# Patient Record
Sex: MALE | Race: WHITE | NOT HISPANIC OR LATINO | Employment: OTHER | URBAN - METROPOLITAN AREA
[De-identification: names, ages, dates, MRNs, and addresses within clinical notes are randomized per-mention and may not be internally consistent; named-entity substitution may affect disease eponyms.]

---

## 2017-02-14 ENCOUNTER — HOSPITAL ENCOUNTER (OUTPATIENT)
Dept: LAB | Facility: MEDICAL CENTER | Age: 77
End: 2017-02-14
Attending: PHYSICIAN ASSISTANT
Payer: MEDICARE

## 2017-02-14 LAB
BASOPHILS # BLD AUTO: 0.04 K/UL (ref 0–0.12)
BASOPHILS NFR BLD AUTO: 0.7 % (ref 0–1.8)
EOSINOPHIL # BLD: 0.27 K/UL (ref 0–0.51)
EOSINOPHIL NFR BLD AUTO: 4.5 % (ref 0–6.9)
ERYTHROCYTE [DISTWIDTH] IN BLOOD BY AUTOMATED COUNT: 52.3 FL (ref 35.9–50)
HCT VFR BLD AUTO: 46.8 % (ref 42–52)
HGB BLD-MCNC: 14.9 G/DL (ref 14–18)
IMM GRANULOCYTES # BLD AUTO: 0.02 K/UL (ref 0–0.11)
IMM GRANULOCYTES NFR BLD AUTO: 0.3 % (ref 0–0.9)
LYMPHOCYTES # BLD: 1.69 K/UL (ref 1–4.8)
LYMPHOCYTES NFR BLD AUTO: 28.4 % (ref 22–41)
MCH RBC QN AUTO: 31.8 PG (ref 27–33)
MCHC RBC AUTO-ENTMCNC: 31.8 G/DL (ref 33.7–35.3)
MCV RBC AUTO: 99.8 FL (ref 81.4–97.8)
MONOCYTES # BLD: 0.36 K/UL (ref 0–0.85)
MONOCYTES NFR BLD AUTO: 6.1 % (ref 0–13.4)
NEUTROPHILS # BLD: 3.57 K/UL (ref 1.82–7.42)
NEUTROPHILS NFR BLD AUTO: 60 % (ref 44–72)
NRBC # BLD AUTO: 0 K/UL
NRBC BLD-RTO: 0 /100 WBC
PLATELET # BLD AUTO: 204 K/UL (ref 164–446)
PMV BLD AUTO: 10.5 FL (ref 9–12.9)
RBC # BLD AUTO: 4.69 M/UL (ref 4.7–6.1)
WBC # BLD AUTO: 6 K/UL (ref 4.8–10.8)

## 2017-02-14 PROCEDURE — 85025 COMPLETE CBC W/AUTO DIFF WBC: CPT

## 2017-02-14 PROCEDURE — 36415 COLL VENOUS BLD VENIPUNCTURE: CPT

## 2017-04-04 ENCOUNTER — OFFICE VISIT (OUTPATIENT)
Dept: CARDIOLOGY | Facility: MEDICAL CENTER | Age: 77
End: 2017-04-04
Payer: MEDICARE

## 2017-04-04 ENCOUNTER — NON-PROVIDER VISIT (OUTPATIENT)
Dept: CARDIOLOGY | Facility: MEDICAL CENTER | Age: 77
End: 2017-04-04
Payer: MEDICARE

## 2017-04-04 VITALS
HEART RATE: 82 BPM | HEIGHT: 65 IN | SYSTOLIC BLOOD PRESSURE: 100 MMHG | OXYGEN SATURATION: 93 % | WEIGHT: 182 LBS | BODY MASS INDEX: 30.32 KG/M2 | DIASTOLIC BLOOD PRESSURE: 56 MMHG

## 2017-04-04 DIAGNOSIS — I25.10 CORONARY ARTERY CALCIFICATION SEEN ON CAT SCAN: ICD-10-CM

## 2017-04-04 DIAGNOSIS — I10 ESSENTIAL HYPERTENSION, BENIGN: ICD-10-CM

## 2017-04-04 DIAGNOSIS — E78.2 MIXED HYPERLIPIDEMIA: ICD-10-CM

## 2017-04-04 DIAGNOSIS — Z95.0 CARDIAC PACEMAKER IN SITU: ICD-10-CM

## 2017-04-04 DIAGNOSIS — I49.5 SICK SINUS SYNDROME (HCC): ICD-10-CM

## 2017-04-04 PROCEDURE — 99213 OFFICE O/P EST LOW 20 MIN: CPT | Performed by: INTERNAL MEDICINE

## 2017-04-04 PROCEDURE — 1101F PT FALLS ASSESS-DOCD LE1/YR: CPT | Mod: 8P | Performed by: INTERNAL MEDICINE

## 2017-04-04 PROCEDURE — G8419 CALC BMI OUT NRM PARAM NOF/U: HCPCS | Performed by: INTERNAL MEDICINE

## 2017-04-04 PROCEDURE — 1036F TOBACCO NON-USER: CPT | Performed by: INTERNAL MEDICINE

## 2017-04-04 PROCEDURE — G8432 DEP SCR NOT DOC, RNG: HCPCS | Performed by: INTERNAL MEDICINE

## 2017-04-04 PROCEDURE — 93288 INTERROG EVL PM/LDLS PM IP: CPT | Performed by: NURSE PRACTITIONER

## 2017-04-04 PROCEDURE — G8598 ASA/ANTIPLAT THER USED: HCPCS | Performed by: INTERNAL MEDICINE

## 2017-04-04 PROCEDURE — 4040F PNEUMOC VAC/ADMIN/RCVD: CPT | Performed by: INTERNAL MEDICINE

## 2017-04-04 ASSESSMENT — LIFESTYLE VARIABLES: SUBSTANCE_ABUSE: 0

## 2017-04-04 ASSESSMENT — ENCOUNTER SYMPTOMS
WHEEZING: 0
BRUISES/BLEEDS EASILY: 0
ORTHOPNEA: 0
FALLS: 0
MYALGIAS: 0
PND: 0
COUGH: 0

## 2017-04-04 NOTE — MR AVS SNAPSHOT
Pro Kearney   2017 8:00 AM   Non-Provider Visit   MRN: [id number]    Department:  [facility]   Dept Phone:  [phone]    Description:  Male : 1940   Provider:  BRIANNA Payne           Reason for Visit     Pacemaker Check/Dysfunction           Allergies as of 2017     Allergen Noted Reactions    Rocephin [Ceftriaxone Sodium] 10/31/2012   Shortness of Breath, Rash, Itching    Anaphylactic type reaction, rapid onset    Codeine 10/17/2007   Vomiting    Clindamycin 2017       C difficile    Morphine 2015       Shellfish-Derived Products 2014   Itching    Shellfish, minor allergy; has had contrast without difficulty      You were diagnosed with     Cardiac pacemaker in situ   [V45.01.ICD-9-CM]       Sick sinus syndrome (CMS-HCC)   [777118]         Vital Signs     Smoking Status                   Former Smoker           Basic Information     Date Of Birth Sex Race Ethnicity Preferred Language    1940 Male White Non- English      Your appointments     2017  9:20 AM   FOLLOW UP with Larry Goss M.D.   [facility] ([facility]    [address]  [address]  [facility] [id number] [phone]              Problem List              ICD-10-CM Priority Class Noted - Resolved    GERD (gastroesophageal reflux disease) K21.9 Low  2011 - Present    Aortic calcification noted 2007 on xray I70.0 High  2013 - Present    Coronary artery calcification seen on CAT scan I25.10 High  2014 - Present    Cardiac pacemaker in situ Z95.0 High  2014 - Present    Lumbosacral spondylosis without myelopathy M47.817 Medium  2014 - Present    Mixed hyperlipidemia E78.2   2015 - Present    BMI 30.0-30.9,adult Z68.30   2015 - Present    Allergic rhinitis due to pollen J30.1   10/15/2015 - Present    Sick sinus syndrome (CMS-HCC) I49.5   11/10/2015 - Present    Premature  ventricular contractions I49.3   3/8/2016 - Present    BPH (benign prostatic hyperplasia) N40.0   3/18/2016 - Present    Essential hypertension, benign I10   10/26/2016 - Present    Mastoiditis, acute, with complication H70.099   10/27/2016 - Present      Health Maintenance        Date Due Completion Dates    IMM ZOSTER VACCINE 7/21/2000 ---    IMM PNEUMOCOCCAL 65+ (ADULT) LOW/MEDIUM RISK SERIES (2 of 2 - PCV13) 10/2/2009 10/2/2008    IMM DTaP/Tdap/Td Vaccine (2 - Td) 3/31/2024 3/31/2014, 5/8/2010    COLONOSCOPY 12/7/2024 12/7/2014 (Prv Comp), 9/30/2010 (N/S)    Override on 12/7/2014: Previously completed    Override on 9/30/2010: (N/S)            Current Immunizations     Influenza TIV (IM) 10/2/2008  2:45 PM    Pneumococcal polysaccharide vaccine (PPSV-23) 10/2/2008  2:45 PM    TD Vaccine 5/8/2010    Tdap Vaccine 3/31/2014      Below and/or attached are the medications your provider expects you to take. Review all of your home medications and newly ordered medications with your provider and/or pharmacist. Follow medication instructions as directed by your provider and/or pharmacist. Please keep your medication list with you and share with your provider. Update the information when medications are discontinued, doses are changed, or new medications (including over-the-counter products) are added; and carry medication information at all times in the event of emergency situations     Allergies:  ROCEPHIN - Shortness of Breath,Rash,Itching     CODEINE - Vomiting     CLINDAMYCIN - (reactions not documented)     MORPHINE - (reactions not documented)     SHELLFISH-DERIVED PRODUCTS - Itching               Medications  Valid as of: April 04, 2017 -  8:57 AM    Generic Name Brand Name Tablet Size Instructions for use    Aspirin (Tab)  MG Take 325 mg by mouth every 6 hours as needed.        Atenolol (Tab) TENORMIN 50 MG 50 mg by Oral route QDAY.         Benzonatate (Cap) TESSALON 100 MG Take 2 Caps by mouth 3 times a  day as needed for Cough.        Ciprofloxacin HCl (Tab) CIPRO 500 MG Take 1 Tab by mouth 2 times a day.        Doxycycline Hyclate (Tab) VIBRAMYCIN 100 MG Take 1 Tab by mouth 2 times a day.        Fluticasone Propionate (Suspension) FLONASE 50 MCG/ACT Spray 1 Spray in nose every day.        MethylPREDNISolone (Tab) MEDROL DOSPACK 4 MG Follow package instructions        Ofloxacin (Solution) OCUFLOX 0.3 % Place 4 Drops in left eye 2 Times a Day.        Omeprazole (CAPSULE DELAYED RELEASE) PRILOSEC 20 MG 20 mg by Oral route QDAY. Cholesterol         Simvastatin (Tab) ZOCOR 40 MG Take 1 Tab by mouth every evening.        Tamsulosin HCl (Cap) FLOMAX 0.4 MG Take 0.4 mg by mouth ONE-HALF HOUR AFTER BREAKFAST.        .                 Medicines prescribed today were sent to:     [facility]/PHARMACY #[id number] - [place], [place] - [address] [place] [address]    Phone: [phone] Fax: [phone]    Open 24 Hours?: No      Medication refill instructions:       If your prescription bottle indicates you have medication refills left, it is not necessary to call your provider’s office. Please contact your pharmacy and they will refill your medication.    If your prescription bottle indicates you do not have any refills left, you may request refills at any time through one of the following ways: The online [employer] system (except Urgent Care), by calling your provider’s office, or by asking your pharmacy to contact your provider’s office with a refill request. Medication refills are processed only during regular business hours and may not be available until the next business day. Your provider may request additional information or to have a follow-up visit with you prior to refilling your medication.   *Please Note: Medication refills are assigned a new Rx number when refilled electronically. Your pharmacy may indicate that no refills were authorized even though a new prescription for the same medication is available at the pharmacy.  Please request the medicine by name with the pharmacy before contacting your provider for a refill.           MyChart Access Code: Activation code not generated  Current MyChart Status: Active

## 2017-04-04 NOTE — MR AVS SNAPSHOT
"Pro Kearney   2017 9:20 AM   Office Visit   MRN: [id number]    Department:  [facility]   Dept Phone:  [phone]    Description:  Male : 1940   Provider:  Larry Goss M.D.           Reason for Visit     Follow-Up           Allergies as of 2017     Allergen Noted Reactions    Rocephin [Ceftriaxone Sodium] 10/31/2012   Shortness of Breath, Rash, Itching    Anaphylactic type reaction, rapid onset    Codeine 10/17/2007   Vomiting    Clindamycin 2017       C difficile    Morphine 2015       Shellfish-Derived Products 2014   Itching    Shellfish, minor allergy; has had contrast without difficulty      Vital Signs     Blood Pressure Pulse Height Weight Body Mass Index Oxygen Saturation    100/56 mmHg 82 1.651 m (5' 5\") 82.555 kg (182 lb) 30.29 kg/m2 93%    Smoking Status                   Former Smoker           Basic Information     Date Of Birth Sex Race Ethnicity Preferred Language    1940 Male White Non- English      Your appointments     2017  9:20 AM   FOLLOW UP with Larry Goss M.D.   [facility] (--)    [address]  [address]  [place] [address] [phone]            Oct 09, 2017  8:00 AM   PACER CHECK ONLY with BRIANNA Payne   [facility] (--)    [address]  [address]  [place] [address] [phone]            Oct 09, 2017  8:20 AM   FOLLOW UP with Larry Goss M.D.   [facility] (--)    [address]  [address]  [place] [id number] [phone]              Problem List              ICD-10-CM Priority Class Noted - Resolved    GERD (gastroesophageal reflux disease) K21.9 Low  2011 - Present    Aortic calcification noted 2007 on xray I70.0 High  2013 - Present    Coronary artery calcification seen " on CAT scan I25.10 High  1/31/2014 - Present    Cardiac pacemaker in situ Z95.0 High  1/31/2014 - Present    Lumbosacral spondylosis without myelopathy M47.817 Medium  6/25/2014 - Present    Mixed hyperlipidemia E78.2   4/8/2015 - Present    BMI 30.0-30.9,adult Z68.30   7/21/2015 - Present    Allergic rhinitis due to pollen J30.1   10/15/2015 - Present    Sick sinus syndrome (CMS-HCC) I49.5   11/10/2015 - Present    Premature ventricular contractions I49.3   3/8/2016 - Present    BPH (benign prostatic hyperplasia) N40.0   3/18/2016 - Present    Essential hypertension, benign I10   10/26/2016 - Present    Mastoiditis, acute, with complication H70.099   10/27/2016 - Present      Health Maintenance        Date Due Completion Dates    IMM ZOSTER VACCINE 7/21/2000 ---    IMM PNEUMOCOCCAL 65+ (ADULT) LOW/MEDIUM RISK SERIES (2 of 2 - PCV13) 10/2/2009 10/2/2008    IMM DTaP/Tdap/Td Vaccine (2 - Td) 3/31/2024 3/31/2014, 5/8/2010    COLONOSCOPY 12/7/2024 12/7/2014 (Prv Comp), 9/30/2010 (N/S)    Override on 12/7/2014: Previously completed    Override on 9/30/2010: (N/S)            Current Immunizations     Influenza TIV (IM) 10/2/2008  2:45 PM    Pneumococcal polysaccharide vaccine (PPSV-23) 10/2/2008  2:45 PM    TD Vaccine 5/8/2010    Tdap Vaccine 3/31/2014      Below and/or attached are the medications your provider expects you to take. Review all of your home medications and newly ordered medications with your provider and/or pharmacist. Follow medication instructions as directed by your provider and/or pharmacist. Please keep your medication list with you and share with your provider. Update the information when medications are discontinued, doses are changed, or new medications (including over-the-counter products) are added; and carry medication information at all times in the event of emergency situations     Allergies:  ROCEPHIN - Shortness of Breath,Rash,Itching     CODEINE - Vomiting     CLINDAMYCIN - (reactions not  documented)     MORPHINE - (reactions not documented)     SHELLFISH-DERIVED PRODUCTS - Itching               Medications  Valid as of: April 04, 2017 -  9:05 AM    Generic Name Brand Name Tablet Size Instructions for use    Aspirin (Tab)  MG Take 325 mg by mouth every 6 hours as needed.        Atenolol (Tab) TENORMIN 50 MG 50 mg by Oral route QDAY.         Benzonatate (Cap) TESSALON 100 MG Take 2 Caps by mouth 3 times a day as needed for Cough.        Ciprofloxacin HCl (Tab) CIPRO 500 MG Take 1 Tab by mouth 2 times a day.        Doxycycline Hyclate (Tab) VIBRAMYCIN 100 MG Take 1 Tab by mouth 2 times a day.        Fluticasone Propionate (Suspension) FLONASE 50 MCG/ACT Spray 1 Spray in nose every day.        MethylPREDNISolone (Tab) MEDROL DOSPACK 4 MG Follow package instructions        Ofloxacin (Solution) OCUFLOX 0.3 % Place 4 Drops in left eye 2 Times a Day.        Omeprazole (CAPSULE DELAYED RELEASE) PRILOSEC 20 MG 20 mg by Oral route QDAY. Cholesterol         Simvastatin (Tab) ZOCOR 40 MG Take 1 Tab by mouth every evening.        Tamsulosin HCl (Cap) FLOMAX 0.4 MG Take 0.4 mg by mouth ONE-HALF HOUR AFTER BREAKFAST.        .                 Medicines prescribed today were sent to:     [facility]/PHARMACY #[id number] - [place] - [address] [place] [address]    Phone: [phone] Fax: [phone]    Open 24 Hours?: No      Medication refill instructions:       If your prescription bottle indicates you have medication refills left, it is not necessary to call your provider’s office. Please contact your pharmacy and they will refill your medication.    If your prescription bottle indicates you do not have any refills left, you may request refills at any time through one of the following ways: The online [employer] system (except Urgent Care), by calling your provider’s office, or by asking your pharmacy to contact your provider’s office with a refill request. Medication refills are processed only during  regular business hours and may not be available until the next business day. Your provider may request additional information or to have a follow-up visit with you prior to refilling your medication.   *Please Note: Medication refills are assigned a new Rx number when refilled electronically. Your pharmacy may indicate that no refills were authorized even though a new prescription for the same medication is available at the pharmacy. Please request the medicine by name with the pharmacy before contacting your provider for a refill.           [employer]u Access Code: Activation code not generated  Current Holidu Status: Active

## 2017-04-04 NOTE — Clinical Note
[facility]   [address]   [address]  [place] [address]  Phone: [phone]  Fax: [phone]              Pro Kearney  1940    Encounter Date: 4/4/2017    Larry Goss M.D.          PROGRESS NOTE:  Subjective:   Pro Kearney is a 76 y.o. male who presents today for follow-up of his coronary calcification history and lipids. He had a recurrent otitis and develop C. difficile after clindamycin therapy that has recovered. He has not had any cardiac symptoms.    Past Medical History   Diagnosis Date   • CAD (coronary artery disease)      coronary calcification noted on CT scan   • Unspecified disorder of middle ear and mastoid      Middle ear/mastoid dis, recurrent otitis   • HTN (hypertension)     • Hyperlipidemia    • Sick sinus syndrome (CMS-HCC)    • Cardiac pacemaker in situ      Generator replacement with Medtronic Adapta ADDR01 by Dr. Maldonado, 11/2012. Original device implanted in 1985. RV lead revision in 1995.   • Lower urinary tract symptoms (LUTS)      BPH   • Arthritis      Generalized   • Pneumonia 2000   • Back pain June 2014     Status post laminectomy by Dr. Balderrama, with relieft of symptoms.   • Nephrolithiasis    • Hiatus hernia syndrome    • Clostridium difficile infection      complicating clindamycin therapy for otitis     Past Surgical History   Procedure Laterality Date   • Appendectomy     • Other abdominal surgery     • Cholecystectomy     • Cataract extraction with iol Bilateral          • Recovery  11/28/2012     Performed by Cath-Recovery Surgery at SURGERY SAME DAY [facility] ORS   • Lumbar laminectomy diskectomy  6/25/2014     Performed by Ankit Balderrama M.D. at SUR[facility]ERY [facility] ORS   • Pacemaker insertion  November 2012     Generator replacement with Medtronic Adapta ADDR01 by Dr. Maldonado. Original device implanted in 1985. RV lead revision in 1995.     Family History   Problem Relation  Age of Onset   • Other Mother      leukemia   • Other Father      leukemia   • Cancer Sister      History   Smoking status   • Former Smoker -- 1.00 packs/day for 25 years   • Types: Cigarettes   • Quit date: 01/01/1969   Smokeless tobacco   • Former User   • Types: Snuff, Chew   • Quit date: 01/01/2007     Comment: 18 months ago - chewing tobacco     Allergies   Allergen Reactions   • Rocephin [Ceftriaxone Sodium] Shortness of Breath, Rash and Itching     Anaphylactic type reaction, rapid onset   • Codeine Vomiting   • Clindamycin      C difficile   • Morphine    • Shellfish-Derived Products Itching     Shellfish, minor allergy; has had contrast without difficulty     Outpatient Encounter Prescriptions as of 4/4/2017   Medication Sig Dispense Refill   • ofloxacin (OCUFLOX) 0.3 % Solution Place 4 Drops in left eye 2 Times a Day. 1 Bottle 0   • ciprofloxacin (CIPRO) 500 MG Tab Take 1 Tab by mouth 2 times a day. 10 Tab 0   • benzonatate (TESSALON) 100 MG Cap Take 2 Caps by mouth 3 times a day as needed for Cough. 60 Cap 0   • aspirin (ASA) 325 MG Tab Take 325 mg by mouth every 6 hours as needed.     • tamsulosin (FLOMAX) 0.4 MG capsule Take 0.4 mg by mouth ONE-HALF HOUR AFTER BREAKFAST.     • fluticasone (FLONASE) 50 MCG/ACT nasal spray Spray 1 Spray in nose every day. 16 g 11   • simvastatin (ZOCOR) 40 MG TABS Take 1 Tab by mouth every evening. 30 Tab 6   • OMEPRAZOLE 20 MG PO CPDR 20 mg by Oral route QDAY. Cholesterol      • ATENOLOL 50 MG PO TABS 50 mg by Oral route QDAY.      • methylPREDNISolone (MEDROL DOSPACK) 4 MG Tab Follow package instructions 1 Tab 0   • doxycycline (VIBRAMYCIN) 100 MG Tab Take 1 Tab by mouth 2 times a day. 20 Tab 0     No facility-administered encounter medications on file as of 4/4/2017.     Review of Systems   HENT: Negative for nosebleeds.    Respiratory: Negative for cough and wheezing.    Cardiovascular: Negative for orthopnea and PND.   Gastrointestinal:        His GI tract has  "almost completely recovered   Musculoskeletal: Negative for myalgias and falls.   Endo/Heme/Allergies: Does not bruise/bleed easily.   Psychiatric/Behavioral: Negative for substance abuse.        Objective:   /56 mmHg  Pulse 82  Ht 1.651 m (5' 5\")  Wt 82.555 kg (182 lb)  BMI 30.29 kg/m2  SpO2 93%    Physical Exam   Constitutional: He is oriented to person, place, and time. He appears well-developed and well-nourished. No distress.   Eyes: Conjunctivae are normal. No scleral icterus.   Neck: No JVD present.   Cardiovascular: Normal rate, regular rhythm, S1 normal and S2 normal.  Exam reveals no gallop.    Murmur (2/6sem, LSB) heard.  Pulmonary/Chest: Effort normal and breath sounds normal.   Musculoskeletal: He exhibits edema (trace on the left, unchanged).   Neurological: He is alert and oriented to person, place, and time.   Skin: Skin is warm and dry. He is not diaphoretic.   Psychiatric: He has a normal mood and affect. Thought content normal.     Pacemaker check was satisfactory.    Assessment:     1. Coronary artery calcification seen on CAT scan     2. Mixed hyperlipidemia     3. Essential hypertension, benign     4. Sick sinus syndrome (CMS-HCC)       He has not had any significant arrhythmias and pacemaker function is satisfactory. Blood pressure is well controlled. [facility] and were [facility] one month ago. He has not had any coronary symptoms.    Medical Decision Making:  Today's Assessment / Status / Plan:     I made no change in regimen today. Follow-up in 6 months with pacemaker check.  [facility] and [facility] as well.      No Recipients                "

## 2017-04-04 NOTE — PROGRESS NOTES
Device is working normally. One mode switching episode on 2/17/2017, lasting 43 seconds (<0.1% of total time).  Normal sensing and capture of RA and RV leads; stable impedances. Battery longevity is 5 years.  No changes are made today.    FU in 6 months for next PM check with me. He does see Dr. Goss today too.    Collaborating MD: Wolfgang

## 2017-04-05 NOTE — PROGRESS NOTES
Subjective:   Pro Kearney is a 76 y.o. male who presents today for follow-up of his coronary calcification history and lipids. He had a recurrent otitis and develop C. difficile after clindamycin therapy that has recovered. He has not had any cardiac symptoms.    Past Medical History   Diagnosis Date   • CAD (coronary artery disease)      coronary calcification noted on CT scan   • Unspecified disorder of middle ear and mastoid      Middle ear/mastoid dis, recurrent otitis   • HTN (hypertension)     • Hyperlipidemia    • Sick sinus syndrome (CMS-HCC)    • Cardiac pacemaker in situ      Generator replacement with Medtronic Adapta ADDR01 by Dr. Maldonado, 11/2012. Original device implanted in 1985. RV lead revision in 1995.   • Lower urinary tract symptoms (LUTS)      BPH   • Arthritis      Generalized   • Pneumonia 2000   • Back pain June 2014     Status post laminectomy by Dr. Balderrama, with relieft of symptoms.   • Nephrolithiasis    • Hiatus hernia syndrome    • Clostridium difficile infection      complicating clindamycin therapy for otitis     Past Surgical History   Procedure Laterality Date   • Appendectomy     • Other abdominal surgery     • Cholecystectomy     • Cataract extraction with iol Bilateral          • Recovery  11/28/2012     Performed by Cath-Recovery Surgery at SURGERY SAME DAY [facility] ORS   • Lumbar laminectomy diskectomy  6/25/2014     Performed by Ankit Balderrama M.D. at SURGERY [facility] ORS   • Pacemaker insertion  November 2012     Generator replacement with Medtronic Adapta ADDR01 by Dr. Maldonado. Original device implanted in 1985. RV lead revision in 1995.     Family History   Problem Relation Age of Onset   • Other Mother      leukemia   • Other Father      leukemia   • Cancer Sister      History   Smoking status   • Former Smoker -- 1.00 packs/day for 25 years   • Types: Cigarettes   • Quit date: 01/01/1969   Smokeless tobacco   • Former User   • Types: Snuff, Chew   • Quit date:  "01/01/2007     Comment: 18 months ago - chewing tobacco     Allergies   Allergen Reactions   • Rocephin [Ceftriaxone Sodium] Shortness of Breath, Rash and Itching     Anaphylactic type reaction, rapid onset   • Codeine Vomiting   • Clindamycin      C difficile   • Morphine    • Shellfish-Derived Products Itching     Shellfish, minor allergy; has had contrast without difficulty     Outpatient Encounter Prescriptions as of 4/4/2017   Medication Sig Dispense Refill   • ofloxacin (OCUFLOX) 0.3 % Solution Place 4 Drops in left eye 2 Times a Day. 1 Bottle 0   • ciprofloxacin (CIPRO) 500 MG Tab Take 1 Tab by mouth 2 times a day. 10 Tab 0   • benzonatate (TESSALON) 100 MG Cap Take 2 Caps by mouth 3 times a day as needed for Cough. 60 Cap 0   • aspirin (ASA) 325 MG Tab Take 325 mg by mouth every 6 hours as needed.     • tamsulosin (FLOMAX) 0.4 MG capsule Take 0.4 mg by mouth ONE-HALF HOUR AFTER BREAKFAST.     • fluticasone (FLONASE) 50 MCG/ACT nasal spray Spray 1 Spray in nose every day. 16 g 11   • simvastatin (ZOCOR) 40 MG TABS Take 1 Tab by mouth every evening. 30 Tab 6   • OMEPRAZOLE 20 MG PO CPDR 20 mg by Oral route QDAY. Cholesterol      • ATENOLOL 50 MG PO TABS 50 mg by Oral route QDAY.      • methylPREDNISolone (MEDROL DOSPACK) 4 MG Tab Follow package instructions 1 Tab 0   • doxycycline (VIBRAMYCIN) 100 MG Tab Take 1 Tab by mouth 2 times a day. 20 Tab 0     No facility-administered encounter medications on file as of 4/4/2017.     Review of Systems   HENT: Negative for nosebleeds.    Respiratory: Negative for cough and wheezing.    Cardiovascular: Negative for orthopnea and PND.   Gastrointestinal:        His GI tract has almost completely recovered   Musculoskeletal: Negative for myalgias and falls.   Endo/Heme/Allergies: Does not bruise/bleed easily.   Psychiatric/Behavioral: Negative for substance abuse.        Objective:   /56 mmHg  Pulse 82  Ht 1.651 m (5' 5\")  Wt 82.555 kg (182 lb)  BMI 30.29 kg/m2  " SpO2 93%    Physical Exam   Constitutional: He is oriented to person, place, and time. He appears well-developed and well-nourished. No distress.   Eyes: Conjunctivae are normal. No scleral icterus.   Neck: No JVD present.   Cardiovascular: Normal rate, regular rhythm, S1 normal and S2 normal.  Exam reveals no gallop.    Murmur (2/6sem, LSB) heard.  Pulmonary/Chest: Effort normal and breath sounds normal.   Musculoskeletal: He exhibits edema (trace on the left, unchanged).   Neurological: He is alert and oriented to person, place, and time.   Skin: Skin is warm and dry. He is not diaphoretic.   Psychiatric: He has a normal mood and affect. Thought content normal.     Pacemaker check was satisfactory.    Assessment:     1. Coronary artery calcification seen on CAT scan     2. Mixed hyperlipidemia     3. Essential hypertension, benign     4. Sick sinus syndrome (CMS-HCC)       He has not had any significant arrhythmias and pacemaker function is satisfactory. Blood pressure is well controlled. [facility] and were [facility] one month ago. He has not had any coronary symptoms.    Medical Decision Making:  Today's Assessment / Status / Plan:     I made no change in regimen today. Follow-up in 6 months with pacemaker check.  [facility] and [facility] as well.

## 2017-06-26 ENCOUNTER — PATIENT OUTREACH (OUTPATIENT)
Dept: HEALTH INFORMATION MANAGEMENT | Facility: OTHER | Age: 77
End: 2017-06-26

## 2017-06-26 NOTE — PROGRESS NOTES
6/26/17   -   Outcome: Voice mail has not been set up    WebIZ Checked & Epic Updated:  yes    HealthConnect Verified: no    Attempt # 1

## 2017-07-14 NOTE — PROGRESS NOTES
7/14/17   -  Communication Preference Obtained: yes     Review Care Team: no    Annual Wellness Visit Scheduling  1. Scheduling Status:Not Scheduled. Patient states they are not interested      Care Gap Scheduling (Attempt to Schedule EACH Overdue Care Gap!)     Health Maintenance Due   Topic Date Due   • IMM ZOSTER VACCINE  Unable to discuss care gaps   • IMM PNEUMOCOCCAL 65+ (ADULT) LOW/MEDIUM RISK SERIES (2 of 2 - PCV13)    • Annual Wellness Visit  Pt declined AWV         Adzuna Activation: already active  Adzuna Deejay: yes  Virtual Visits: yes  Opt In to Text Messages: yes

## 2017-09-30 ENCOUNTER — HOSPITAL ENCOUNTER (EMERGENCY)
Facility: MEDICAL CENTER | Age: 77
End: 2017-09-30
Attending: EMERGENCY MEDICINE
Payer: MEDICARE

## 2017-09-30 VITALS
TEMPERATURE: 98.4 F | OXYGEN SATURATION: 94 % | BODY MASS INDEX: 30.71 KG/M2 | HEART RATE: 71 BPM | RESPIRATION RATE: 16 BRPM | WEIGHT: 184.53 LBS

## 2017-09-30 DIAGNOSIS — R00.2 PALPITATIONS: ICD-10-CM

## 2017-09-30 DIAGNOSIS — R06.00 DYSPNEA, UNSPECIFIED TYPE: ICD-10-CM

## 2017-09-30 LAB
ALBUMIN SERPL BCP-MCNC: 3.9 G/DL (ref 3.2–4.9)
ALBUMIN/GLOB SERPL: 1.6 G/DL
ALP SERPL-CCNC: 90 U/L (ref 30–99)
ALT SERPL-CCNC: 14 U/L (ref 2–50)
ANION GAP SERPL CALC-SCNC: 10 MMOL/L (ref 0–11.9)
AST SERPL-CCNC: 19 U/L (ref 12–45)
BASOPHILS # BLD AUTO: 1.1 % (ref 0–1.8)
BASOPHILS # BLD: 0.06 K/UL (ref 0–0.12)
BILIRUB SERPL-MCNC: 0.4 MG/DL (ref 0.1–1.5)
BNP SERPL-MCNC: 21 PG/ML (ref 0–100)
BUN SERPL-MCNC: 16 MG/DL (ref 8–22)
CALCIUM SERPL-MCNC: 9 MG/DL (ref 8.5–10.5)
CHLORIDE SERPL-SCNC: 109 MMOL/L (ref 96–112)
CO2 SERPL-SCNC: 23 MMOL/L (ref 20–33)
CREAT SERPL-MCNC: 1.08 MG/DL (ref 0.5–1.4)
EKG IMPRESSION: NORMAL
EOSINOPHIL # BLD AUTO: 0.38 K/UL (ref 0–0.51)
EOSINOPHIL NFR BLD: 7 % (ref 0–6.9)
ERYTHROCYTE [DISTWIDTH] IN BLOOD BY AUTOMATED COUNT: 50.5 FL (ref 35.9–50)
GFR SERPL CREATININE-BSD FRML MDRD: >60 ML/MIN/1.73 M 2
GLOBULIN SER CALC-MCNC: 2.5 G/DL (ref 1.9–3.5)
GLUCOSE SERPL-MCNC: 89 MG/DL (ref 65–99)
HCT VFR BLD AUTO: 47.3 % (ref 42–52)
HGB BLD-MCNC: 15.4 G/DL (ref 14–18)
IMM GRANULOCYTES # BLD AUTO: 0.03 K/UL (ref 0–0.11)
IMM GRANULOCYTES NFR BLD AUTO: 0.6 % (ref 0–0.9)
LYMPHOCYTES # BLD AUTO: 1.77 K/UL (ref 1–4.8)
LYMPHOCYTES NFR BLD: 32.8 % (ref 22–41)
MCH RBC QN AUTO: 32.2 PG (ref 27–33)
MCHC RBC AUTO-ENTMCNC: 32.6 G/DL (ref 33.7–35.3)
MCV RBC AUTO: 99 FL (ref 81.4–97.8)
MONOCYTES # BLD AUTO: 0.43 K/UL (ref 0–0.85)
MONOCYTES NFR BLD AUTO: 8 % (ref 0–13.4)
NEUTROPHILS # BLD AUTO: 2.73 K/UL (ref 1.82–7.42)
NEUTROPHILS NFR BLD: 50.5 % (ref 44–72)
NRBC # BLD AUTO: 0 K/UL
NRBC BLD AUTO-RTO: 0 /100 WBC
PLATELET # BLD AUTO: 186 K/UL (ref 164–446)
PMV BLD AUTO: 10 FL (ref 9–12.9)
POTASSIUM SERPL-SCNC: 3.8 MMOL/L (ref 3.6–5.5)
PROT SERPL-MCNC: 6.4 G/DL (ref 6–8.2)
RBC # BLD AUTO: 4.78 M/UL (ref 4.7–6.1)
SODIUM SERPL-SCNC: 142 MMOL/L (ref 135–145)
TROPONIN I SERPL-MCNC: <0.01 NG/ML (ref 0–0.04)
WBC # BLD AUTO: 5.4 K/UL (ref 4.8–10.8)

## 2017-09-30 PROCEDURE — 700105 HCHG RX REV CODE 258: Performed by: EMERGENCY MEDICINE

## 2017-09-30 PROCEDURE — 84484 ASSAY OF TROPONIN QUANT: CPT

## 2017-09-30 PROCEDURE — 99284 EMERGENCY DEPT VISIT MOD MDM: CPT

## 2017-09-30 PROCEDURE — 93005 ELECTROCARDIOGRAM TRACING: CPT | Performed by: EMERGENCY MEDICINE

## 2017-09-30 PROCEDURE — 94760 N-INVAS EAR/PLS OXIMETRY 1: CPT

## 2017-09-30 PROCEDURE — 85025 COMPLETE CBC W/AUTO DIFF WBC: CPT

## 2017-09-30 PROCEDURE — 36415 COLL VENOUS BLD VENIPUNCTURE: CPT

## 2017-09-30 PROCEDURE — 93005 ELECTROCARDIOGRAM TRACING: CPT

## 2017-09-30 PROCEDURE — 80053 COMPREHEN METABOLIC PANEL: CPT

## 2017-09-30 PROCEDURE — 83880 ASSAY OF NATRIURETIC PEPTIDE: CPT

## 2017-09-30 RX ORDER — SODIUM CHLORIDE 9 MG/ML
500 INJECTION, SOLUTION INTRAVENOUS ONCE
Status: COMPLETED | OUTPATIENT
Start: 2017-09-30 | End: 2017-09-30

## 2017-09-30 RX ADMIN — SODIUM CHLORIDE 500 ML: 9 INJECTION, SOLUTION INTRAVENOUS at 06:24

## 2017-09-30 ASSESSMENT — ENCOUNTER SYMPTOMS
TINGLING: 0
CONSTIPATION: 0
SHORTNESS OF BREATH: 1
DIZZINESS: 1
PALPITATIONS: 1
HEADACHES: 0
NAUSEA: 1

## 2017-09-30 NOTE — ED NOTES
.  Chief Complaint   Patient presents with   • Palpitations     pt states woke up around 3am with sob nausea, states his heart was skipping beats, has pacemaker unsure if may be anxiety but wanted to be checked out   • Shortness of Breath     ekg completed on arrival, resp are even and unlabored at rest, .Pt Informed regarding triage process and verbalized understanding to inform triage tech or RN for any changes in condition.  Placed in lobby.

## 2017-09-30 NOTE — ED NOTES
Pt ambulated to Red 9 with steady gait.  Pt denies CP or SOB currently.  Pulses equal bilaterally. Pacer from [email or web address].

## 2017-09-30 NOTE — ED PROVIDER NOTES
ED Provider Note    Scribed for Lesa Traylor D.O. by Messi Wolf. 9/30/2017, 5:50 AM.    Primary care provider: Pcp Pt States None  Means of arrival: Walk in  History obtained from: Patient  History limited by: None    CHIEF COMPLAINT  Chief Complaint   Patient presents with   • Palpitations     pt states woke up around 3am with sob nausea, states his heart was skipping beats, has pacemaker unsure if may be anxiety but wanted to be checked out   • Shortness of Breath       HPI  Pro Kearney is a 77 y.o. male who presents to the Emergency Department complaining of shortness of breath onset earlier this morning. Patient states he woke up with shortness of breath and nausea. He reports associated palpitations in which he took his pulse which revealed irregular beats. Patient also notes generalized tiredness. Despite having resolution of his symptoms, they were concerning for him at the time, prompting him to come here for evaluation. He mentions having increased swelling particularly to his right foot, occasionally, this is not happening at this time. He denies any headache, numbness, tingling, dizziness, chest pain constipation, urinary symptoms, or pain. Patient reports improvement from the shortness of breath at this time, however he was concerned for his symptoms, prompting him to come in today. He mentions having an episode of shortness of breath a few weeks ago which went away and he did not seek care. He denies history of sleep apnea.    REVIEW OF SYSTEMS  Review of Systems   Constitutional:        Positive generalized tiredness  Negative pain   Respiratory: Positive for shortness of breath.    Cardiovascular: Positive for palpitations.   Gastrointestinal: Positive for nausea. Negative for constipation.   Genitourinary: Negative.    Musculoskeletal:        Positive increased swelling to right foot   Skin:        Positive flushed skin   Neurological: Positive for dizziness. Negative for  tingling and headaches.        Negative numbness   All other systems reviewed and are negative.  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    SURGICAL HISTORY   has a past surgical history that includes appendectomy; other abdominal surgery; cholecystectomy; cataract extraction with iol (Bilateral); recovery (11/28/2012); lumbar laminectomy diskectomy (6/25/2014); and pacemaker insertion (November 2012).    SOCIAL HISTORY  Social History   Substance Use Topics   • Smoking status: Former Smoker     Packs/day: 1.00     Years: 25.00     Types: Cigarettes     Quit date: 1/1/1969   • Smokeless tobacco: Former User     Types: Snuff, Chew     Quit date: 1/1/2007      Comment: 18 months ago - chewing tobacco   • Alcohol use No      History   Drug Use No       FAMILY HISTORY  Family History   Problem Relation Age of Onset   • Other Mother      leukemia   • Other Father      leukemia   • Cancer Sister        CURRENT MEDICATIONS  Home Medications     Reviewed by Nancy Yadav R.N. (Registered Nurse) on 09/30/17 at 0440  Med List Status: Partial   Medication Last Dose Status   aspirin (ASA) 325 MG Tab 4/4/2017 Active   ATENOLOL 50 MG PO TABS 4/4/2017 Active   benzonatate (TESSALON) 100 MG Cap 4/4/2017 Active   ciprofloxacin (CIPRO) 500 MG Tab 4/4/2017 Active   doxycycline (VIBRAMYCIN) 100 MG Tab not taking Active   fluticasone (FLONASE) 50 MCG/ACT nasal spray 4/4/2017 Active   methylPREDNISolone (MEDROL DOSPACK) 4 MG Tab not taking Active   ofloxacin (OCUFLOX) 0.3 % Solution 4/4/2017 Active   OMEPRAZOLE 20 MG PO CPDR 4/4/2017 Active   simvastatin (ZOCOR) 40 MG TABS  4/4/2017 Active   tamsulosin (FLOMAX) 0.4 MG capsule 4/4/2017 Active                ALLERGIES  Allergies   Allergen Reactions   • Rocephin [Ceftriaxone Sodium] Shortness of Breath, Rash and Itching     Anaphylactic type reaction, rapid onset   • Codeine Vomiting   • Clindamycin      C difficile   • Morphine    • Shellfish-Derived Products Itching     Shellfish, minor allergy; has had contrast without difficulty       PHYSICAL EXAM  VITAL SIGNS: Pulse 70   Temp 36.7 °C (98 °F)   Resp 16   Wt 83.7 kg (184 lb 8.4 oz)   SpO2 94%   BMI 30.71 kg/m²   Vitals reviewed.  Constitutional: Patient is oriented to person, place, and time. Appears well-developed and well-nourished. No distress.    Head: Normocephalic and atraumatic.   Ears: Normal external ears bilaterally.   Mouth/Throat: Oropharynx is clear and moist  Eyes: Conjunctivae are normal. Pupils are equal, round, and reactive to light.   Neck: Normal range of motion. Neck supple.  Cardiovascular: Normal rate, regular rhythm and normal heart sounds. Normal peripheral pulses.  Pulmonary/Chest: Effort normal and breath sounds normal. No respiratory distress, no wheezes, rhonchi, or rales. No chest wall tenderness.  Abdominal: Soft. Bowel sounds are normal. There is no tenderness, rebound or guarding, or peritoneal signs. No CVA tenderness.  Musculoskeletal: No edema and no tenderness.   Lymphadenopathy: No cervical adenopathy.   Neurological: No focal deficits.   Skin: Skin is warm and dry. No erythema. No pallor.   Psychiatric: Patient has a normal mood and affect.     LABS  Results for orders placed or performed during the hospital encounter of 09/30/17   CBC w/ Differential   Result Value Ref Range    WBC 5.4 4.8 - 10.8 K/uL    RBC 4.78 4.70 - 6.10 M/uL    Hemoglobin 15.4 14.0 - 18.0 g/dL    Hematocrit 47.3 42.0 - 52.0 %    MCV 99.0 (H) 81.4 - 97.8 fL    MCH 32.2 27.0 - 33.0 pg    MCHC 32.6 (L) 33.7 - 35.3 g/dL    RDW 50.5 (H) 35.9 - 50.0 fL    Platelet Count 186  164 - 446 K/uL    MPV 10.0 9.0 - 12.9 fL    Neutrophils-Polys 50.50 44.00 - 72.00 %    Lymphocytes 32.80 22.00 - 41.00 %    Monocytes 8.00 0.00 - 13.40 %    Eosinophils 7.00 (H) 0.00 - 6.90 %    Basophils 1.10 0.00 - 1.80 %    Immature Granulocytes 0.60 0.00 - 0.90 %    Nucleated RBC 0.00 /100 WBC    Neutrophils (Absolute) 2.73 1.82 - 7.42 K/uL    Lymphs (Absolute) 1.77 1.00 - 4.80 K/uL    Monos (Absolute) 0.43 0.00 - 0.85 K/uL    Eos (Absolute) 0.38 0.00 - 0.51 K/uL    Baso (Absolute) 0.06 0.00 - 0.12 K/uL    Immature Granulocytes (abs) 0.03 0.00 - 0.11 K/uL    NRBC (Absolute) 0.00 K/uL   Complete Metabolic Panel (CMP)   Result Value Ref Range    Sodium 142 135 - 145 mmol/L    Potassium 3.8 3.6 - 5.5 mmol/L    Chloride 109 96 - 112 mmol/L    Co2 23 20 - 33 mmol/L    Anion Gap 10.0 0.0 - 11.9    Glucose 89 65 - 99 mg/dL    Bun 16 8 - 22 mg/dL    Creatinine 1.08 0.50 - 1.40 mg/dL    Calcium 9.0 8.5 - 10.5 mg/dL    AST(SGOT) 19 12 - 45 U/L    ALT(SGPT) 14 2 - 50 U/L    Alkaline Phosphatase 90 30 - 99 U/L    Total Bilirubin 0.4 0.1 - 1.5 mg/dL    Albumin 3.9 3.2 - 4.9 g/dL    Total Protein 6.4 6.0 - 8.2 g/dL    Globulin 2.5 1.9 - 3.5 g/dL    A-G Ratio 1.6 g/dL   Btype Natriuretic Peptide   Result Value Ref Range    B Natriuretic Peptide 21 0 - 100 pg/mL   Troponin STAT   Result Value Ref Range    Troponin I <0.01 0.00 - 0.04 ng/mL   ESTIMATED GFR   Result Value Ref Range    GFR If African American >60 >60 mL/min/1.73 m 2    GFR If Non African American >60 >60 mL/min/1.73 m 2   EKG (NOW)   Result Value Ref Range    Report       [facility] Emergency Dept.    Test Date:  2017  Pt Name:    CLINT RONQUILLO              Department: ER  MRN:        [id number]                      Room:  Gender:     ROSE MARIE                            Technician: [id number]  :        1940                   Requested By:ER TRIAGE PROTOCOL  Order #:    [id number]                    Reading MD:    Measurements  Intervals                                 Axis  Rate:       70                           P:  DE:         232                          QRS:        -64  QRSD:       100                          T:          19  QT:         400  QTc:        432    Interpretive Statements  ATRIAL-PACED RHYTHM  LEFT ANTERIOR FASCICULAR BLOCK  BORDERLINE R WAVE PROGRESSION, ANTERIOR LEADS  BORDERLINE T ABNORMALITIES, ANTERIOR LEADS  Compared to ECG 12/21/2015 18:25:46  No significant changes        All labs reviewed by me.    EKG Interpretation  Interpreted by me    Rhythm:paced  Rate:70  Axis: normal  Ectopy: none  Conduction: normal  ST Segments: no acute change  T Waves: no acute change  Q Waves: none    Clinical Impression: Paced rhythm. No acute changes. Normal rate.     COURSE & MEDICAL DECISION MAKING  Pertinent Labs & Imaging studies reviewed. (See chart for details)    Obtained and reviewed past medical records which show patient had a follow up visit for CAD with cardiology in April 2017. He was last admitted in October 2016 for a possible mastoiditis.    5:50 AM - Patient seen and examined at bedside. This is a pleasant 77-year-old female who presents with symptoms of shortness of breath that has since resolved that he had upon waking. He also noted some palpitations at the time. He never had chest pain. He does not have any symptoms at this time is feeling well. However, he was concerned about them prompting him to come to the ED. He grew to go home at this time, but is agreeable to screening, given his past history.  Ordered CBC, CMP, BNP, troponin stat, estimated GFR, EKG to evaluate his symptoms. The differential diagnoses include but are not limited to: palpitations, anxiety, ACS     8:22 AM Patient reevaluated at bedside. Patient is doing well. No further symptoms. He is pain-free. He is not short of breath. He is eager to get going with his stay, stating that he needs to go set up the field for a baseball game. He is given strict return  precautions. This time, I think is safe for discharge to home.  Patient was given the opportunity for questions. The patient understands and agrees.        The patient will return for new or worsening symptoms and is stable at the time of discharge.    The patient is referred to a primary physician for blood pressure management, diabetic screening, and for all other preventative health concerns.    DISPOSITION:  Patient will be discharged home in stable condition.    FOLLOW UP:  Your Physician  Varies    In 2 days      [facility], Emergency Dept  [address] [address] [phone]    If symptoms worsen      OUTPATIENT MEDICATIONS:  Discharge Medication List as of 9/30/2017  8:46 AM           FINAL IMPRESSION  1. Palpitations    2. Dyspnea, unspecified type          I, Messi Wolf (Scribe), am scribing for, and in the presence of, Lesa Traylor D.O..    Electronically signed by: Messi Wolf (Scribe), 9/30/2017    I, Lesa Traylor D.O. personally performed the services described in this documentation, as scribed by Messi Wolf in my presence, and it is both accurate and complete.    The note accurately reflects work and decisions made by me.  Lesa Traylor  9/30/2017  12:43 PM

## 2017-09-30 NOTE — ED NOTES
Discharged home with wife. Pt has apt to follow up with cardiology 10/9. Return for worsening symptoms, or CP.

## 2017-10-01 ENCOUNTER — PATIENT OUTREACH (OUTPATIENT)
Dept: HEALTH INFORMATION MANAGEMENT | Facility: OTHER | Age: 77
End: 2017-10-01

## 2017-10-09 ENCOUNTER — OFFICE VISIT (OUTPATIENT)
Dept: CARDIOLOGY | Facility: MEDICAL CENTER | Age: 77
End: 2017-10-09
Payer: MEDICARE

## 2017-10-09 ENCOUNTER — NON-PROVIDER VISIT (OUTPATIENT)
Dept: CARDIOLOGY | Facility: MEDICAL CENTER | Age: 77
End: 2017-10-09
Payer: MEDICARE

## 2017-10-09 VITALS
OXYGEN SATURATION: 94 % | SYSTOLIC BLOOD PRESSURE: 130 MMHG | WEIGHT: 184 LBS | HEART RATE: 82 BPM | BODY MASS INDEX: 30.66 KG/M2 | HEIGHT: 65 IN | DIASTOLIC BLOOD PRESSURE: 68 MMHG

## 2017-10-09 DIAGNOSIS — I25.10 CORONARY ARTERY CALCIFICATION SEEN ON CAT SCAN: ICD-10-CM

## 2017-10-09 DIAGNOSIS — Z95.0 CARDIAC PACEMAKER IN SITU: ICD-10-CM

## 2017-10-09 DIAGNOSIS — E78.2 MIXED HYPERLIPIDEMIA: ICD-10-CM

## 2017-10-09 DIAGNOSIS — I77.819 DILATION OF DESCENDING AORTA (HCC): ICD-10-CM

## 2017-10-09 DIAGNOSIS — I49.5 SICK SINUS SYNDROME (HCC): ICD-10-CM

## 2017-10-09 DIAGNOSIS — I70.0 AORTIC CALCIFICATION (HCC): ICD-10-CM

## 2017-10-09 PROCEDURE — 99213 OFFICE O/P EST LOW 20 MIN: CPT | Performed by: INTERNAL MEDICINE

## 2017-10-09 PROCEDURE — 93288 INTERROG EVL PM/LDLS PM IP: CPT | Performed by: NURSE PRACTITIONER

## 2017-10-09 RX ORDER — CYCLOSPORINE 0.5 MG/ML
1 EMULSION OPHTHALMIC
COMMUNITY
Start: 2017-09-26

## 2017-10-09 ASSESSMENT — ENCOUNTER SYMPTOMS
MYALGIAS: 0
ORTHOPNEA: 0
FALLS: 0
COUGH: 0
PND: 0
WHEEZING: 0

## 2017-10-09 ASSESSMENT — LIFESTYLE VARIABLES: SUBSTANCE_ABUSE: 0

## 2017-10-09 NOTE — LETTER
[facility]   [address]   [address]  [place] [address]  Phone: [phone]  Fax: [phone]              Pro Kearney  1940    Encounter Date: 10/9/2017    Larry Goss M.D.          PROGRESS NOTE:  Subjective:   Pro Kearney is a 77 y.o. male who presents today For follow-up of his history of coronary calcification and pacemaker follow-up. He also has mild dilation of the infrarenal aorta which requires annual ultrasound. [facility]. No cardiac symptoms at all.    Past Medical History:   Diagnosis Date   • Back pain June 2014    Status post laminectomy by Dr. Balderrama, with relieft of symptoms.   • Pneumonia 2000   • Arthritis     Generalized   • CAD (coronary artery disease)     coronary calcification noted on CT scan   • Cardiac pacemaker in situ     Generator replacement with Medtronic Adapta ADDR01 by Dr. Maldonado, 11/2012. Original device implanted in 1985. RV lead revision in 1995.   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 at SURGERY [facility] ORS   • RECOVERY  11/28/2012    Performed by Cath-Recovery Surgery at SURGERY SAME DAY [facility] ORS   • PACEMAKER INSERTION  November 2012    Generator replacement with Medtronic Adapta ADDR01 by Dr. Maldonado. Original device implanted in 1985. RV lead revision in 1995.   • APPENDECTOMY     • CATARACT EXTRACTION WITH IOL Bilateral         • CHOLECYSTECTOMY       Family History   Problem  Relation Age of Onset   • Other Mother      leukemia   • Other Father      leukemia   • Cancer Sister      History   Smoking Status   • Former Smoker   • Packs/day: 1.00   • Years: 25.00   • Types: Cigarettes   • Quit date: 1/1/1969   Smokeless Tobacco   • Former User   • Types: Snuff, Chew   • Quit date: 1/1/2007     Comment: 18 months ago - chewing tobacco     Allergies   Allergen Reactions   • Rocephin [Ceftriaxone Sodium] Shortness of Breath, Rash and Itching     Anaphylactic type reaction, rapid onset   • Codeine Vomiting   • Clindamycin      C difficile   • Morphine    • Shellfish-Derived Products Itching     Shellfish, minor allergy; has had contrast without difficulty     Outpatient Encounter Prescriptions as of 10/9/2017   Medication Sig Dispense Refill   • RESTASIS 0.05 % ophthalmic emulsion      • ofloxacin (OCUFLOX) 0.3 % Solution Place 4 Drops in left eye 2 Times a Day. 1 Bottle 0   • ciprofloxacin (CIPRO) 500 MG Tab Take 1 Tab by mouth 2 times a day. 10 Tab 0   • methylPREDNISolone (MEDROL DOSPACK) 4 MG Tab Follow package instructions 1 Tab 0   • doxycycline (VIBRAMYCIN) 100 MG Tab Take 1 Tab by mouth 2 times a day. 20 Tab 0   • benzonatate (TESSALON) 100 MG Cap Take 2 Caps by mouth 3 times a day as needed for Cough. 60 Cap 0   • aspirin (ASA) 325 MG Tab Take 325 mg by mouth every 6 hours as needed.     • tamsulosin (FLOMAX) 0.4 MG capsule Take 0.4 mg by mouth ONE-HALF HOUR AFTER BREAKFAST.     • fluticasone (FLONASE) 50 MCG/ACT nasal spray Spray 1 Spray in nose every day. 16 g 11   • simvastatin (ZOCOR) 40 MG TABS Take 1 Tab by mouth every evening. 30 Tab 6   • OMEPRAZOLE 20 MG PO CPDR 20 mg by Oral route QDAY. Cholesterol      • ATENOLOL 50 MG PO TABS 50 mg by Oral route QDAY.        No facility-administered encounter medications on file as of 10/9/2017.      Review of Systems   HENT: Positive for hearing loss ([facility] connected and followed [facility]).    Respiratory: Negative for cough and wheezing.       Cardiovascular: Negative for orthopnea and PND.   Musculoskeletal: Negative for falls and myalgias.   Psychiatric/Behavioral: Negative for substance abuse.        Objective:   Blood pressure 130/80, heart rate 70 and regular, respirations 16, weight 182 pounds, height 65 inches pulse oximetry 93% on room air    Physical Exam   Constitutional: He is oriented to person, place, and time. He appears well-developed and well-nourished. No distress.   Eyes: Conjunctivae are normal. No scleral icterus.   Neck: No JVD present.   Cardiovascular: Normal rate, regular rhythm, S1 normal and S2 normal.  Exam reveals no gallop.    Murmur (2/6sem, LSB) heard.  Pulmonary/Chest: Effort normal and breath sounds normal.   Musculoskeletal: He exhibits edema (trace on the left, unchanged).   Neurological: He is alert and oriented to person, place, and time.   Skin: Skin is warm and dry. He is not diaphoretic.   Psychiatric: He has a normal mood and affect. Thought content normal.     Pacemaker check was very satisfactory. Lab work was satisfactory and [facility] at [facility] because they track his simvastatin and all were satisfactory according to his report.  Assessment:     1. Coronary artery calcification seen on CAT scan     2. Aortic calcification noted 12/2007 on xray     3. Dilation of descending aorta (CMS-HCC)  ABDOMINAL AORTA ULTRASOUND   4. Mixed hyperlipidemia  LIPID PROFILE     He is clinically very stable but his aorta will be due for scanning again in November. He's going to request copies of his [facility] lab for me but his lab done here were satisfactory although they didn't include lipids. He is confident that the cholesterol was very well controlled based on his [facility] the [facility].  Medical Decision Making:  Today's Assessment / Status / Plan:   Continue the current cardiovascular regimen.  [facility] with  [facility].   Cardiology follow up in 6 months and  sooner if needed for any change.    We will request lab from the [facility] but will repeat lipids if they're not recent and he will get an abdominal ultrasound done either there or here before the year's out.  Use of the emergency medical system reviewed.       [facility]  [address] [address]  VIA Facsimile: [phone]

## 2017-10-09 NOTE — PROGRESS NOTES
Device is working normally. 46 mode switching episodes (<0.1% of total time).  Normal sensing and capture of RA and RV leads; stable impedances. Battery longevity is 4 years.  No changes are made today.    FU in 6 months for next PM check with me.    Collaborating MD: Wolfgang

## 2017-10-09 NOTE — PROGRESS NOTES
Subjective:   Pro Kearney is a 77 y.o. male who presents today For follow-up of his history of coronary calcification and pacemaker follow-up. He also has mild dilation of the infrarenal aorta which requires annual ultrasound. [facility] No cardiac symptoms at all.    Past Medical History:   Diagnosis Date   • Back pain June 2014    Status post laminectomy by Dr. Balderrama, with relieft of symptoms.   • Pneumonia 2000   • Arthritis     Generalized   • CAD (coronary artery disease)     coronary calcification noted on CT scan   • Cardiac pacemaker in situ     Generator replacement with Medtronic Adapta ADDR01 by Dr. Maldonado, 11/2012. Original device implanted in 1985. RV lead revision in 1995.   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 at SURGERY [facility] ORS   • RECOVERY  11/28/2012    Performed by Cath-Recovery Surgery at SURGERY SAME DAY [facility] ORS   • PACEMAKER INSERTION  November 2012    Generator replacement with Medtronic Adapta ADDR01 by Dr. Maldonado. Original device implanted in 1985. RV lead revision in 1995.   • APPENDECTOMY     • CATARACT EXTRACTION WITH IOL Bilateral         • CHOLECYSTECTOMY       Family History   Problem Relation Age of Onset   • Other Mother      leukemia   • Other Father      leukemia   • Cancer Sister      History   Smoking Status   • Former Smoker   • Packs/day: 1.00   • Years: 25.00   • Types: Cigarettes   • Quit date: 1/1/1969   Smokeless Tobacco   • Former User   • Types: Snuff, Chew   •  Quit date: 1/1/2007     Comment: 18 months ago - chewing tobacco     Allergies   Allergen Reactions   • Rocephin [Ceftriaxone Sodium] Shortness of Breath, Rash and Itching     Anaphylactic type reaction, rapid onset   • Codeine Vomiting   • Clindamycin      C difficile   • Morphine    • Shellfish-Derived Products Itching     Shellfish, minor allergy; has had contrast without difficulty     Outpatient Encounter Prescriptions as of 10/9/2017   Medication Sig Dispense Refill   • RESTASIS 0.05 % ophthalmic emulsion      • ofloxacin (OCUFLOX) 0.3 % Solution Place 4 Drops in left eye 2 Times a Day. 1 Bottle 0   • ciprofloxacin (CIPRO) 500 MG Tab Take 1 Tab by mouth 2 times a day. 10 Tab 0   • methylPREDNISolone (MEDROL DOSPACK) 4 MG Tab Follow package instructions 1 Tab 0   • doxycycline (VIBRAMYCIN) 100 MG Tab Take 1 Tab by mouth 2 times a day. 20 Tab 0   • benzonatate (TESSALON) 100 MG Cap Take 2 Caps by mouth 3 times a day as needed for Cough. 60 Cap 0   • aspirin (ASA) 325 MG Tab Take 325 mg by mouth every 6 hours as needed.     • tamsulosin (FLOMAX) 0.4 MG capsule Take 0.4 mg by mouth ONE-HALF HOUR AFTER BREAKFAST.     • fluticasone (FLONASE) 50 MCG/ACT nasal spray Spray 1 Spray in nose every day. 16 g 11   • simvastatin (ZOCOR) 40 MG TABS Take 1 Tab by mouth every evening. 30 Tab 6   • OMEPRAZOLE 20 MG PO CPDR 20 mg by Oral route QDAY. Cholesterol      • ATENOLOL 50 MG PO TABS 50 mg by Oral route QDAY.        No facility-administered encounter medications on file as of 10/9/2017.      Review of Systems   HENT: Positive for hearing loss ([facility] followed [facility]).    Respiratory: Negative for cough and wheezing.    Cardiovascular: Negative for orthopnea and PND.   Musculoskeletal: Negative for falls and myalgias.   Psychiatric/Behavioral: Negative for substance abuse.        Objective:   Blood pressure 130/80, heart rate 70 and regular, respirations 16, weight 182 pounds, height 65 inches pulse oximetry  93% on room air    Physical Exam   Constitutional: He is oriented to person, place, and time. He appears well-developed and well-nourished. No distress.   Eyes: Conjunctivae are normal. No scleral icterus.   Neck: No JVD present.   Cardiovascular: Normal rate, regular rhythm, S1 normal and S2 normal.  Exam reveals no gallop.    Murmur (2/6sem, LSB) heard.  Pulmonary/Chest: Effort normal and breath sounds normal.   Musculoskeletal: He exhibits edema (trace on the left, unchanged).   Neurological: He is alert and oriented to person, place, and time.   Skin: Skin is warm and dry. He is not diaphoretic.   Psychiatric: He has a normal mood and affect. Thought content normal.     Pacemaker check was very satisfactory. Lab work was satisfactory and [facility] because they track his simvastatin and all were satisfactory according to his report.  Assessment:     1. Coronary artery calcification seen on CAT scan     2. Aortic calcification noted 12/2007 on xray     3. Dilation of descending aorta (CMS-HCC)  ABDOMINAL AORTA ULTRASOUND   4. Mixed hyperlipidemia  LIPID PROFILE     He is clinically very stable but his aorta will be due for scanning again in November. He's going to request copies of his [facility] lab for me but his lab done here were satisfactory although they didn't include lipids. He is confident that the cholesterol was very well controlled based on his [facility] the [facility].  Medical Decision Making:  Today's Assessment / Status / Plan:   Continue the current cardiovascular regimen.  [facility] with  [facility].   Cardiology follow up in 6 months and  sooner if needed for any change.   We will [facility] from the [facility] but will repeat lipids if they're not recent and he will get an abdominal ultrasound done either there or here before the year's out.  Use of the emergency medical system reviewed.

## 2017-10-27 ENCOUNTER — HOSPITAL ENCOUNTER (OUTPATIENT)
Dept: RADIOLOGY | Facility: MEDICAL CENTER | Age: 77
End: 2017-10-27
Attending: INTERNAL MEDICINE
Payer: MEDICARE

## 2017-10-27 DIAGNOSIS — I77.819 DILATION OF DESCENDING AORTA (HCC): ICD-10-CM

## 2017-10-27 PROCEDURE — 93978 VASCULAR STUDY: CPT

## 2017-10-27 PROCEDURE — 93978 VASCULAR STUDY: CPT | Mod: 26 | Performed by: INTERNAL MEDICINE

## 2018-03-26 ENCOUNTER — HOSPITAL ENCOUNTER (EMERGENCY)
Dept: HOSPITAL 8 - ED | Age: 78
Discharge: HOME | End: 2018-03-26
Payer: MEDICARE

## 2018-03-26 VITALS — SYSTOLIC BLOOD PRESSURE: 130 MMHG | DIASTOLIC BLOOD PRESSURE: 75 MMHG

## 2018-03-26 VITALS — WEIGHT: 192.46 LBS | BODY MASS INDEX: 32.86 KG/M2 | HEIGHT: 64 IN
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
## 2018-03-26 DIAGNOSIS — S80.12XA: Primary | ICD-10-CM

## 2018-03-26 DIAGNOSIS — I48.92: ICD-10-CM
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
## 2018-03-26 PROCEDURE — 99284 EMERGENCY DEPT VISIT MOD MDM: CPT

## 2018-03-31 ENCOUNTER — HOSPITAL ENCOUNTER (EMERGENCY)
Dept: HOSPITAL 8 - ED | Age: 78
Discharge: HOME | End: 2018-03-31
Payer: MEDICARE

## 2018-03-31 VITALS — DIASTOLIC BLOOD PRESSURE: 81 MMHG | SYSTOLIC BLOOD PRESSURE: 132 MMHG

## 2018-03-31 VITALS — HEIGHT: 65 IN | BODY MASS INDEX: 32.14 KG/M2 | WEIGHT: 192.9 LBS

## 2018-03-31 DIAGNOSIS — Z88.5: ICD-10-CM

## 2018-03-31 DIAGNOSIS — J18.9: Primary | ICD-10-CM

## 2018-03-31 DIAGNOSIS — K21.9: ICD-10-CM

## 2018-03-31 DIAGNOSIS — E78.5: ICD-10-CM

## 2018-03-31 DIAGNOSIS — I10: ICD-10-CM

## 2018-03-31 PROCEDURE — 99284 EMERGENCY DEPT VISIT MOD MDM: CPT

## 2018-03-31 PROCEDURE — 71046 X-RAY EXAM CHEST 2 VIEWS: CPT

## 2018-04-02 ENCOUNTER — HOSPITAL ENCOUNTER (EMERGENCY)
Dept: HOSPITAL 8 - ED | Age: 78
Discharge: HOME | End: 2018-04-02
Payer: MEDICARE

## 2018-04-02 VITALS — BODY MASS INDEX: 31.99 KG/M2 | HEIGHT: 65 IN | WEIGHT: 192.02 LBS

## 2018-04-02 VITALS — SYSTOLIC BLOOD PRESSURE: 159 MMHG | DIASTOLIC BLOOD PRESSURE: 75 MMHG

## 2018-04-02 DIAGNOSIS — S80.12XA: Primary | ICD-10-CM

## 2018-04-02 DIAGNOSIS — K21.9: ICD-10-CM

## 2018-04-02 DIAGNOSIS — I10: ICD-10-CM

## 2018-04-02 DIAGNOSIS — J98.01: ICD-10-CM
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
## 2018-04-02 DIAGNOSIS — Z90.49: ICD-10-CM
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
## 2018-04-02 DIAGNOSIS — E78.5: ICD-10-CM
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
## 2018-04-02 DIAGNOSIS — T36.1X5A: ICD-10-CM

## 2018-04-02 LAB
ALBUMIN SERPL-MCNC: 3.9 G/DL (ref 3.4–5)
ANION GAP SERPL CALC-SCNC: 8 MMOL/L (ref 5–15)
BASOPHILS # BLD AUTO: 0.06 X10^3/UL (ref 0–0.1)
BASOPHILS NFR BLD AUTO: 1 % (ref 0–1)
CALCIUM SERPL-MCNC: 8.6 MG/DL (ref 8.5–10.1)
CHLORIDE SERPL-SCNC: 109 MMOL/L (ref 98–107)
CREAT SERPL-MCNC: 1.12 MG/DL (ref 0.7–1.3)
EOSINOPHIL # BLD AUTO: 0.53 X10^3/UL (ref 0–0.4)
EOSINOPHIL NFR BLD AUTO: 7 % (ref 1–7)
ERYTHROCYTE [DISTWIDTH] IN BLOOD BY AUTOMATED COUNT: 14.4 % (ref 9.4–14.8)
LYMPHOCYTES # BLD AUTO: 2 X10^3/UL (ref 1–3.4)
LYMPHOCYTES NFR BLD AUTO: 26 % (ref 22–44)
MCH RBC QN AUTO: 33 PG (ref 27.5–34.5)
MCHC RBC AUTO-ENTMCNC: 33.9 G/DL (ref 33.2–36.2)
MCV RBC AUTO: 97.3 FL (ref 81–97)
MD: NO
MONOCYTES # BLD AUTO: 0.49 X10^3/UL (ref 0.2–0.8)
MONOCYTES NFR BLD AUTO: 6 % (ref 2–9)
NEUTROPHILS # BLD AUTO: 4.64 X10^3/UL (ref 1.8–6.8)
NEUTROPHILS NFR BLD AUTO: 60 % (ref 42–75)
PLATELET # BLD AUTO: 243 X10^3/UL (ref 130–400)
PMV BLD AUTO: 8.3 FL (ref 7.4–10.4)
RBC # BLD AUTO: 4.56 X10^6/UL (ref 4.38–5.82)

## 2018-04-02 PROCEDURE — 36415 COLL VENOUS BLD VENIPUNCTURE: CPT

## 2018-04-02 PROCEDURE — 82040 ASSAY OF SERUM ALBUMIN: CPT

## 2018-04-02 PROCEDURE — 96372 THER/PROPH/DIAG INJ SC/IM: CPT

## 2018-04-02 PROCEDURE — 99285 EMERGENCY DEPT VISIT HI MDM: CPT

## 2018-04-02 PROCEDURE — 85025 COMPLETE CBC W/AUTO DIFF WBC: CPT

## 2018-04-02 PROCEDURE — 80048 BASIC METABOLIC PNL TOTAL CA: CPT

## 2018-04-02 PROCEDURE — 96374 THER/PROPH/DIAG INJ IV PUSH: CPT

## 2018-04-02 PROCEDURE — 93971 EXTREMITY STUDY: CPT

## 2018-05-15 ENCOUNTER — NON-PROVIDER VISIT (OUTPATIENT)
Dept: CARDIOLOGY | Facility: MEDICAL CENTER | Age: 78
End: 2018-05-15
Payer: MEDICARE

## 2018-05-15 VITALS
HEART RATE: 75 BPM | DIASTOLIC BLOOD PRESSURE: 62 MMHG | SYSTOLIC BLOOD PRESSURE: 114 MMHG | OXYGEN SATURATION: 95 % | BODY MASS INDEX: 29.99 KG/M2 | HEIGHT: 65 IN | WEIGHT: 180 LBS

## 2018-05-15 DIAGNOSIS — I49.5 SICK SINUS SYNDROME (HCC): ICD-10-CM

## 2018-05-15 DIAGNOSIS — Z95.0 CARDIAC PACEMAKER IN SITU: ICD-10-CM

## 2018-05-15 PROCEDURE — 93288 INTERROG EVL PM/LDLS PM IP: CPT | Performed by: NURSE PRACTITIONER

## 2018-05-15 NOTE — PROGRESS NOTES
Device is working normally. 64 mode switching episodes, all <5 seconds (<0.1% of total time).  Normal sensing and capture of RA and RV leads; stable impedances. Battery longevity is 3.5 years.  No changes are made today.    FU in 6 months for next PM check with me. He will also see Dr. Campbell to establish care (previous patient of Dr. Goss)    Same medications for now.    Collaborating MD: Matthias

## 2018-06-01 ENCOUNTER — APPOINTMENT (RX ONLY)
Dept: URBAN - METROPOLITAN AREA CLINIC 20 | Facility: CLINIC | Age: 78
Setting detail: DERMATOLOGY
End: 2018-06-01

## 2018-06-01 DIAGNOSIS — D22 MELANOCYTIC NEVI: ICD-10-CM

## 2018-06-01 DIAGNOSIS — Z12.83 ENCOUNTER FOR SCREENING FOR MALIGNANT NEOPLASM OF SKIN: ICD-10-CM

## 2018-06-01 DIAGNOSIS — L82.0 INFLAMED SEBORRHEIC KERATOSIS: ICD-10-CM

## 2018-06-01 DIAGNOSIS — L81.4 OTHER MELANIN HYPERPIGMENTATION: ICD-10-CM

## 2018-06-01 DIAGNOSIS — L82.1 OTHER SEBORRHEIC KERATOSIS: ICD-10-CM

## 2018-06-01 DIAGNOSIS — Z87.2 PERSONAL HISTORY OF DISEASES OF THE SKIN AND SUBCUTANEOUS TISSUE: ICD-10-CM

## 2018-06-01 DIAGNOSIS — L21.8 OTHER SEBORRHEIC DERMATITIS: ICD-10-CM

## 2018-06-01 PROBLEM — E78.5 HYPERLIPIDEMIA, UNSPECIFIED: Status: ACTIVE | Noted: 2018-06-01

## 2018-06-01 PROBLEM — D22.4 MELANOCYTIC NEVI OF SCALP AND NECK: Status: ACTIVE | Noted: 2018-06-01

## 2018-06-01 PROBLEM — I10 ESSENTIAL (PRIMARY) HYPERTENSION: Status: ACTIVE | Noted: 2018-06-01

## 2018-06-01 PROCEDURE — ? LIQUID NITROGEN

## 2018-06-01 PROCEDURE — 17110 DESTRUCTION B9 LES UP TO 14: CPT

## 2018-06-01 PROCEDURE — ? COUNSELING

## 2018-06-01 PROCEDURE — 99214 OFFICE O/P EST MOD 30 MIN: CPT | Mod: 25

## 2018-06-01 ASSESSMENT — LOCATION DETAILED DESCRIPTION DERM
LOCATION DETAILED: RIGHT MEDIAL SUPERIOR CHEST
LOCATION DETAILED: RIGHT CLAVICULAR NECK
LOCATION DETAILED: LEFT SUPERIOR MEDIAL MIDBACK
LOCATION DETAILED: STERNAL NOTCH
LOCATION DETAILED: LEFT MEDIAL FRONTAL SCALP
LOCATION DETAILED: RIGHT INFERIOR FOREHEAD
LOCATION DETAILED: LEFT MEDIAL SUPERIOR CHEST
LOCATION DETAILED: LEFT FOREHEAD
LOCATION DETAILED: RIGHT CLAVICULAR SKIN
LOCATION DETAILED: STERNUM
LOCATION DETAILED: LEFT INFERIOR FOREHEAD
LOCATION DETAILED: RIGHT FOREHEAD
LOCATION DETAILED: 4TH WEB SPACE RIGHT HAND
LOCATION DETAILED: LEFT CENTRAL MALAR CHEEK

## 2018-06-01 ASSESSMENT — LOCATION ZONE DERM
LOCATION ZONE: SCALP
LOCATION ZONE: FACE
LOCATION ZONE: NECK
LOCATION ZONE: TRUNK
LOCATION ZONE: HAND

## 2018-06-01 ASSESSMENT — LOCATION SIMPLE DESCRIPTION DERM
LOCATION SIMPLE: RIGHT CLAVICULAR SKIN
LOCATION SIMPLE: LEFT FOREHEAD
LOCATION SIMPLE: CHEST
LOCATION SIMPLE: LEFT CHEEK
LOCATION SIMPLE: RIGHT FOREHEAD
LOCATION SIMPLE: RIGHT HAND
LOCATION SIMPLE: LEFT SCALP
LOCATION SIMPLE: LEFT LOWER BACK
LOCATION SIMPLE: RIGHT ANTERIOR NECK

## 2018-06-01 NOTE — PROCEDURE: LIQUID NITROGEN
Medical Necessity Information: It is in your best interest to select a reason for this procedure from the list below. All of these items fulfill various CMS LCD requirements except the new and changing color options.
Include Z78.9 (Other Specified Conditions Influencing Health Status) As An Associated Diagnosis?: Yes
Number Of Freeze-Thaw Cycles: 3 freeze-thaw cycles
Post-Care Instructions: I reviewed with the patient in detail post-care instructions. Patient is to avoid picking at any of the treated lesions. Pt may apply Vaseline to crusted or scabbing areas.
Detail Level: Detailed
Medical Necessity Clause: This procedure was medically necessary because the lesions that were treated were: itchy
Consent: The patient's consent was obtained including but not limited to risks of crusting, scabbing, blistering, scarring, darker or lighter pigmentary change, recurrence, incomplete removal and infection.
Add 52 Modifier (Optional): no

## 2018-07-12 ENCOUNTER — APPOINTMENT (RX ONLY)
Dept: URBAN - METROPOLITAN AREA CLINIC 20 | Facility: CLINIC | Age: 78
Setting detail: DERMATOLOGY
End: 2018-07-12

## 2018-07-12 DIAGNOSIS — D485 NEOPLASM OF UNCERTAIN BEHAVIOR OF SKIN: ICD-10-CM

## 2018-07-12 DIAGNOSIS — L82.0 INFLAMED SEBORRHEIC KERATOSIS: ICD-10-CM

## 2018-07-12 PROBLEM — D48.5 NEOPLASM OF UNCERTAIN BEHAVIOR OF SKIN: Status: ACTIVE | Noted: 2018-07-12

## 2018-07-12 PROCEDURE — 99213 OFFICE O/P EST LOW 20 MIN: CPT | Mod: 25

## 2018-07-12 PROCEDURE — ? SEPARATE AND IDENTIFIABLE DOCUMENTATION

## 2018-07-12 PROCEDURE — 11100: CPT | Mod: 59

## 2018-07-12 PROCEDURE — 17110 DESTRUCTION B9 LES UP TO 14: CPT

## 2018-07-12 PROCEDURE — ? BIOPSY BY SHAVE METHOD

## 2018-07-12 PROCEDURE — ? COUNSELING

## 2018-07-12 PROCEDURE — ? LIQUID NITROGEN

## 2018-07-12 ASSESSMENT — LOCATION ZONE DERM
LOCATION ZONE: TRUNK
LOCATION ZONE: FACE

## 2018-07-12 ASSESSMENT — LOCATION DETAILED DESCRIPTION DERM
LOCATION DETAILED: LEFT INFERIOR MEDIAL UPPER BACK
LOCATION DETAILED: LEFT INFERIOR LATERAL MIDBACK
LOCATION DETAILED: LEFT INFERIOR UPPER BACK
LOCATION DETAILED: RIGHT INFERIOR MEDIAL MIDBACK
LOCATION DETAILED: SUPERIOR LUMBAR SPINE
LOCATION DETAILED: LEFT LATERAL UPPER BACK
LOCATION DETAILED: INFERIOR MID FOREHEAD
LOCATION DETAILED: LEFT MID-UPPER BACK
LOCATION DETAILED: LEFT SUPERIOR MEDIAL LOWER BACK

## 2018-07-12 ASSESSMENT — LOCATION SIMPLE DESCRIPTION DERM
LOCATION SIMPLE: INFERIOR FOREHEAD
LOCATION SIMPLE: LEFT UPPER BACK
LOCATION SIMPLE: RIGHT LOWER BACK
LOCATION SIMPLE: LEFT LOWER BACK
LOCATION SIMPLE: LOWER BACK

## 2018-07-12 NOTE — PROCEDURE: COUNSELING
Detail Level: Zone
Patient Specific Counseling (Will Not Stick From Patient To Patient): ISKs from last visit are resolved.
Detail Level: Detailed

## 2018-07-12 NOTE — PROCEDURE: BIOPSY BY SHAVE METHOD
Bill For Surgical Tray: no
Wound Care: Aquaphor
Billing Type: Third-Party Bill
Additional Anesthesia Volume In Cc (Will Not Render If 0): 0
Cryotherapy Text: The wound bed was treated with cryotherapy after the biopsy was performed.
Anesthesia Volume In Cc: 0.1
Notification Instructions: Patient will be notified of biopsy results; however, patient is instructed to call the office if not contacted within 2 weeks.
Biopsy Type: H and E
Post-Care Instructions: Keep the biopsy site dry overnight, then keep the site clean by washing with soap and water twice daily then covering with Vaseline/Aquaphor and a Band-Aid until healed.
Type Of Destruction Used: Curettage
Anesthesia Type: 1% lidocaine with epinephrine and a 1:10 solution of 8.4% sodium bicarbonate
X Size Of Lesion In Cm: 0.2
Curettage Text: The wound bed was treated with curettage after the biopsy was performed.
Was A Bandage Applied: Yes
Lab Facility: 
Depth Of Biopsy: dermis
Dressing: Band-Aid
Lab: [id number]
Consent: Written and verbal consent were obtained and risks were reviewed including but not limited to scarring, infection, bleeding, scabbing, incomplete removal, nerve damage and allergy to anesthesia.
Electrodesiccation Text: The wound bed was treated with electrodesiccation after the biopsy was performed.
Biopsy Method: Personna blade
Size Of Lesion In Cm: 0.3
Silver Nitrate Text: The wound bed was treated with silver nitrate after the biopsy was performed.
Detail Level: Detailed
Electrodesiccation And Curettage Text: The wound bed was treated with electrodesiccation and curettage after the biopsy was performed.
Hemostasis: Drysol and Electrocautery

## 2018-07-12 NOTE — PROCEDURE: LIQUID NITROGEN
Medical Necessity Clause: This procedure was medically necessary because the lesions that were treated were: itcny
Add 52 Modifier (Optional): no
Post-Care Instructions: I reviewed with the patient in detail post-care instructions. Patient is to avoid picking at any of the treated lesions. Pt may apply Vaseline to crusted or scabbing areas.
Medical Necessity Information: It is in your best interest to select a reason for this procedure from the list below. All of these items fulfill various CMS LCD requirements except the new and changing color options.
Render Post-Care Instructions In Note?: yes
Consent: The patient's consent was obtained including but not limited to risks of crusting, scabbing, blistering, scarring, darker or lighter pigmentary change, recurrence, incomplete removal and infection.
Detail Level: Detailed
Number Of Freeze-Thaw Cycles: 3 freeze-thaw cycles

## 2018-08-24 ENCOUNTER — APPOINTMENT (RX ONLY)
Dept: URBAN - METROPOLITAN AREA CLINIC 20 | Facility: CLINIC | Age: 78
Setting detail: DERMATOLOGY
End: 2018-08-24

## 2018-08-24 DIAGNOSIS — L82.0 INFLAMED SEBORRHEIC KERATOSIS: ICD-10-CM | Status: RESOLVED

## 2018-08-24 DIAGNOSIS — L82.1 OTHER SEBORRHEIC KERATOSIS: ICD-10-CM

## 2018-08-24 DIAGNOSIS — L57.0 ACTINIC KERATOSIS: ICD-10-CM

## 2018-08-24 DIAGNOSIS — L73.8 OTHER SPECIFIED FOLLICULAR DISORDERS: ICD-10-CM

## 2018-08-24 PROCEDURE — 17000 DESTRUCT PREMALG LESION: CPT

## 2018-08-24 PROCEDURE — ? COUNSELING

## 2018-08-24 PROCEDURE — ? LIQUID NITROGEN

## 2018-08-24 PROCEDURE — 99213 OFFICE O/P EST LOW 20 MIN: CPT | Mod: 25

## 2018-08-24 ASSESSMENT — LOCATION SIMPLE DESCRIPTION DERM
LOCATION SIMPLE: RIGHT FOREHEAD
LOCATION SIMPLE: LEFT FOREHEAD
LOCATION SIMPLE: RIGHT UPPER BACK
LOCATION SIMPLE: INFERIOR FOREHEAD
LOCATION SIMPLE: LEFT UPPER BACK

## 2018-08-24 ASSESSMENT — LOCATION DETAILED DESCRIPTION DERM
LOCATION DETAILED: LEFT INFERIOR MEDIAL FOREHEAD
LOCATION DETAILED: RIGHT INFERIOR FOREHEAD
LOCATION DETAILED: RIGHT SUPERIOR UPPER BACK
LOCATION DETAILED: LEFT FOREHEAD
LOCATION DETAILED: RIGHT INFERIOR LATERAL UPPER BACK
LOCATION DETAILED: INFERIOR MID FOREHEAD
LOCATION DETAILED: LEFT SUPERIOR UPPER BACK

## 2018-08-24 ASSESSMENT — LOCATION ZONE DERM
LOCATION ZONE: TRUNK
LOCATION ZONE: FACE

## 2018-08-24 NOTE — PROCEDURE: COUNSELING
Detail Level: Zone
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
Detail Level: Generalized

## 2018-08-24 NOTE — PROCEDURE: LIQUID NITROGEN
Duration Of Freeze Thaw-Cycle (Seconds): 0
Consent: The patient's consent was obtained including but not limited to risks of crusting, scabbing, blistering, scarring, darker or lighter pigmentary change, recurrence, incomplete removal and infection.
Render Post-Care Instructions In Note?: yes
Post-Care Instructions: I reviewed with the patient in detail post-care instructions. Patient is to wear sun protection and avoid picking at any of the treated lesions. Pt may apply Vaseline to crusted or scabbing areas.
Detail Level: Detailed

## 2018-09-03 ENCOUNTER — APPOINTMENT (OUTPATIENT)
Dept: RADIOLOGY | Facility: MEDICAL CENTER | Age: 78
End: 2018-09-03
Attending: HOSPITALIST
Payer: MEDICARE

## 2018-09-03 ENCOUNTER — HOSPITAL ENCOUNTER (OUTPATIENT)
Facility: MEDICAL CENTER | Age: 78
End: 2018-09-03
Attending: EMERGENCY MEDICINE | Admitting: HOSPITALIST
Payer: MEDICARE

## 2018-09-03 ENCOUNTER — APPOINTMENT (OUTPATIENT)
Dept: RADIOLOGY | Facility: MEDICAL CENTER | Age: 78
End: 2018-09-03
Attending: EMERGENCY MEDICINE
Payer: MEDICARE

## 2018-09-03 VITALS
TEMPERATURE: 98.2 F | WEIGHT: 187.83 LBS | SYSTOLIC BLOOD PRESSURE: 136 MMHG | OXYGEN SATURATION: 95 % | DIASTOLIC BLOOD PRESSURE: 71 MMHG | BODY MASS INDEX: 31.29 KG/M2 | HEIGHT: 65 IN | RESPIRATION RATE: 16 BRPM | HEART RATE: 75 BPM

## 2018-09-03 DIAGNOSIS — R07.9 CHEST PAIN, UNSPECIFIED TYPE: Primary | ICD-10-CM

## 2018-09-03 LAB
ALBUMIN SERPL BCP-MCNC: 4 G/DL (ref 3.2–4.9)
ALBUMIN/GLOB SERPL: 2 G/DL
ALP SERPL-CCNC: 95 U/L (ref 30–99)
ALT SERPL-CCNC: 16 U/L (ref 2–50)
ANION GAP SERPL CALC-SCNC: 6 MMOL/L (ref 0–11.9)
APTT PPP: 29.6 SEC (ref 24.7–36)
AST SERPL-CCNC: 18 U/L (ref 12–45)
BASOPHILS # BLD AUTO: 1 % (ref 0–1.8)
BASOPHILS # BLD: 0.05 K/UL (ref 0–0.12)
BILIRUB SERPL-MCNC: 0.5 MG/DL (ref 0.1–1.5)
BUN SERPL-MCNC: 12 MG/DL (ref 8–22)
CALCIUM SERPL-MCNC: 9.2 MG/DL (ref 8.5–10.5)
CHLORIDE SERPL-SCNC: 108 MMOL/L (ref 96–112)
CO2 SERPL-SCNC: 26 MMOL/L (ref 20–33)
CREAT SERPL-MCNC: 1.16 MG/DL (ref 0.5–1.4)
EKG IMPRESSION: NORMAL
EKG IMPRESSION: NORMAL
EOSINOPHIL # BLD AUTO: 0.37 K/UL (ref 0–0.51)
EOSINOPHIL NFR BLD: 7.3 % (ref 0–6.9)
ERYTHROCYTE [DISTWIDTH] IN BLOOD BY AUTOMATED COUNT: 50.9 FL (ref 35.9–50)
GLOBULIN SER CALC-MCNC: 2 G/DL (ref 1.9–3.5)
GLUCOSE SERPL-MCNC: 100 MG/DL (ref 65–99)
HCT VFR BLD AUTO: 43.3 % (ref 42–52)
HGB BLD-MCNC: 14.3 G/DL (ref 14–18)
IMM GRANULOCYTES # BLD AUTO: 0.02 K/UL (ref 0–0.11)
IMM GRANULOCYTES NFR BLD AUTO: 0.4 % (ref 0–0.9)
INR PPP: 1.01 (ref 0.87–1.13)
LIPASE SERPL-CCNC: 24 U/L (ref 11–82)
LYMPHOCYTES # BLD AUTO: 1.82 K/UL (ref 1–4.8)
LYMPHOCYTES NFR BLD: 36 % (ref 22–41)
MAGNESIUM SERPL-MCNC: 2 MG/DL (ref 1.5–2.5)
MCH RBC QN AUTO: 32.2 PG (ref 27–33)
MCHC RBC AUTO-ENTMCNC: 33 G/DL (ref 33.7–35.3)
MCV RBC AUTO: 97.5 FL (ref 81.4–97.8)
MONOCYTES # BLD AUTO: 0.46 K/UL (ref 0–0.85)
MONOCYTES NFR BLD AUTO: 9.1 % (ref 0–13.4)
NEUTROPHILS # BLD AUTO: 2.34 K/UL (ref 1.82–7.42)
NEUTROPHILS NFR BLD: 46.2 % (ref 44–72)
NRBC # BLD AUTO: 0 K/UL
NRBC BLD-RTO: 0 /100 WBC
PLATELET # BLD AUTO: 175 K/UL (ref 164–446)
PMV BLD AUTO: 9.8 FL (ref 9–12.9)
POTASSIUM SERPL-SCNC: 3.8 MMOL/L (ref 3.6–5.5)
PROT SERPL-MCNC: 6 G/DL (ref 6–8.2)
PROTHROMBIN TIME: 13 SEC (ref 12–14.6)
RBC # BLD AUTO: 4.44 M/UL (ref 4.7–6.1)
SODIUM SERPL-SCNC: 140 MMOL/L (ref 135–145)
TROPONIN I SERPL-MCNC: <0.01 NG/ML (ref 0–0.04)
TSH SERPL DL<=0.005 MIU/L-ACNC: 2.45 UIU/ML (ref 0.38–5.33)
WBC # BLD AUTO: 5.1 K/UL (ref 4.8–10.8)

## 2018-09-03 PROCEDURE — 99236 HOSP IP/OBS SAME DATE HI 85: CPT | Performed by: HOSPITALIST

## 2018-09-03 PROCEDURE — 93005 ELECTROCARDIOGRAM TRACING: CPT | Mod: XU | Performed by: HOSPITALIST

## 2018-09-03 PROCEDURE — G0378 HOSPITAL OBSERVATION PER HR: HCPCS

## 2018-09-03 PROCEDURE — 93005 ELECTROCARDIOGRAM TRACING: CPT | Performed by: EMERGENCY MEDICINE

## 2018-09-03 PROCEDURE — 36415 COLL VENOUS BLD VENIPUNCTURE: CPT

## 2018-09-03 PROCEDURE — 700111 HCHG RX REV CODE 636 W/ 250 OVERRIDE (IP)

## 2018-09-03 PROCEDURE — 85610 PROTHROMBIN TIME: CPT

## 2018-09-03 PROCEDURE — 84484 ASSAY OF TROPONIN QUANT: CPT | Mod: 91

## 2018-09-03 PROCEDURE — 93005 ELECTROCARDIOGRAM TRACING: CPT

## 2018-09-03 PROCEDURE — 85730 THROMBOPLASTIN TIME PARTIAL: CPT

## 2018-09-03 PROCEDURE — 700102 HCHG RX REV CODE 250 W/ 637 OVERRIDE(OP): Performed by: EMERGENCY MEDICINE

## 2018-09-03 PROCEDURE — A9270 NON-COVERED ITEM OR SERVICE: HCPCS | Performed by: EMERGENCY MEDICINE

## 2018-09-03 PROCEDURE — 85025 COMPLETE CBC W/AUTO DIFF WBC: CPT

## 2018-09-03 PROCEDURE — 83690 ASSAY OF LIPASE: CPT

## 2018-09-03 PROCEDURE — 71045 X-RAY EXAM CHEST 1 VIEW: CPT

## 2018-09-03 PROCEDURE — 84443 ASSAY THYROID STIM HORMONE: CPT

## 2018-09-03 PROCEDURE — 83735 ASSAY OF MAGNESIUM: CPT

## 2018-09-03 PROCEDURE — 93010 ELECTROCARDIOGRAM REPORT: CPT | Performed by: INTERNAL MEDICINE

## 2018-09-03 PROCEDURE — 80053 COMPREHEN METABOLIC PANEL: CPT

## 2018-09-03 PROCEDURE — 99285 EMERGENCY DEPT VISIT HI MDM: CPT

## 2018-09-03 PROCEDURE — A9502 TC99M TETROFOSMIN: HCPCS

## 2018-09-03 RX ORDER — REGADENOSON 0.08 MG/ML
INJECTION, SOLUTION INTRAVENOUS
Status: COMPLETED
Start: 2018-09-03 | End: 2018-09-03

## 2018-09-03 RX ORDER — ASPIRIN 300 MG/1
300 SUPPOSITORY RECTAL ONCE
Status: COMPLETED | OUTPATIENT
Start: 2018-09-03 | End: 2018-09-03

## 2018-09-03 RX ORDER — ATENOLOL 25 MG/1
25 TABLET ORAL DAILY
Qty: 30 TAB | Refills: 0
Start: 2018-09-03 | End: 2018-10-09

## 2018-09-03 RX ORDER — ONDANSETRON 4 MG/1
4 TABLET, ORALLY DISINTEGRATING ORAL EVERY 4 HOURS PRN
Status: DISCONTINUED | OUTPATIENT
Start: 2018-09-03 | End: 2018-09-03 | Stop reason: HOSPADM

## 2018-09-03 RX ORDER — OMEPRAZOLE 20 MG/1
20 CAPSULE, DELAYED RELEASE ORAL
Status: DISCONTINUED | OUTPATIENT
Start: 2018-09-03 | End: 2018-09-03 | Stop reason: HOSPADM

## 2018-09-03 RX ORDER — METOPROLOL SUCCINATE 25 MG/1
25 TABLET, EXTENDED RELEASE ORAL
Status: DISCONTINUED | OUTPATIENT
Start: 2018-09-03 | End: 2018-09-03 | Stop reason: HOSPADM

## 2018-09-03 RX ORDER — CYCLOSPORINE 0.5 MG/ML
1-2 EMULSION OPHTHALMIC EVERY 4 HOURS PRN
Status: DISCONTINUED | OUTPATIENT
Start: 2018-09-03 | End: 2018-09-03

## 2018-09-03 RX ORDER — SIMVASTATIN 40 MG
40 TABLET ORAL EVERY EVENING
Status: DISCONTINUED | OUTPATIENT
Start: 2018-09-03 | End: 2018-09-03 | Stop reason: HOSPADM

## 2018-09-03 RX ORDER — ACETAMINOPHEN 325 MG/1
650 TABLET ORAL EVERY 6 HOURS PRN
Status: DISCONTINUED | OUTPATIENT
Start: 2018-09-03 | End: 2018-09-03 | Stop reason: HOSPADM

## 2018-09-03 RX ORDER — BISACODYL 10 MG
10 SUPPOSITORY, RECTAL RECTAL
Status: DISCONTINUED | OUTPATIENT
Start: 2018-09-03 | End: 2018-09-03 | Stop reason: HOSPADM

## 2018-09-03 RX ORDER — ATENOLOL 25 MG/1
25 TABLET ORAL
Status: ON HOLD | COMMUNITY
End: 2018-09-03

## 2018-09-03 RX ORDER — NITROGLYCERIN 0.4 MG/1
0.4 TABLET SUBLINGUAL PRN
Qty: 25 TAB | Refills: 2 | Status: SHIPPED | OUTPATIENT
Start: 2018-09-03 | End: 2019-07-13

## 2018-09-03 RX ORDER — ASPIRIN 81 MG/1
324 TABLET, CHEWABLE ORAL ONCE
Status: COMPLETED | OUTPATIENT
Start: 2018-09-03 | End: 2018-09-03

## 2018-09-03 RX ORDER — METOPROLOL SUCCINATE 25 MG/1
25 TABLET, EXTENDED RELEASE ORAL
Qty: 30 TAB | Refills: 3 | Status: SHIPPED | OUTPATIENT
Start: 2018-09-03 | End: 2018-09-03

## 2018-09-03 RX ORDER — AMOXICILLIN 250 MG
2 CAPSULE ORAL 2 TIMES DAILY
Status: DISCONTINUED | OUTPATIENT
Start: 2018-09-03 | End: 2018-09-03 | Stop reason: HOSPADM

## 2018-09-03 RX ORDER — ONDANSETRON 2 MG/ML
4 INJECTION INTRAMUSCULAR; INTRAVENOUS EVERY 4 HOURS PRN
Status: DISCONTINUED | OUTPATIENT
Start: 2018-09-03 | End: 2018-09-03 | Stop reason: HOSPADM

## 2018-09-03 RX ORDER — TAMSULOSIN HYDROCHLORIDE 0.4 MG/1
0.4 CAPSULE ORAL
Status: DISCONTINUED | OUTPATIENT
Start: 2018-09-03 | End: 2018-09-03 | Stop reason: HOSPADM

## 2018-09-03 RX ORDER — LABETALOL HYDROCHLORIDE 5 MG/ML
10 INJECTION, SOLUTION INTRAVENOUS EVERY 4 HOURS PRN
Status: DISCONTINUED | OUTPATIENT
Start: 2018-09-03 | End: 2018-09-03 | Stop reason: HOSPADM

## 2018-09-03 RX ORDER — POLYETHYLENE GLYCOL 3350 17 G/17G
1 POWDER, FOR SOLUTION ORAL
Status: DISCONTINUED | OUTPATIENT
Start: 2018-09-03 | End: 2018-09-03 | Stop reason: HOSPADM

## 2018-09-03 RX ORDER — FLUTICASONE PROPIONATE 50 MCG
1 SPRAY, SUSPENSION (ML) NASAL DAILY
Status: DISCONTINUED | OUTPATIENT
Start: 2018-09-03 | End: 2018-09-03

## 2018-09-03 RX ORDER — POLYVINYL ALCOHOL 14 MG/ML
1-2 SOLUTION/ DROPS OPHTHALMIC
Status: DISCONTINUED | OUTPATIENT
Start: 2018-09-03 | End: 2018-09-03 | Stop reason: HOSPADM

## 2018-09-03 RX ADMIN — REGADENOSON 0.4 MG: 0.08 INJECTION, SOLUTION INTRAVENOUS at 14:26

## 2018-09-03 RX ADMIN — ASPIRIN 324 MG: 81 TABLET, CHEWABLE ORAL at 06:45

## 2018-09-03 ASSESSMENT — ENCOUNTER SYMPTOMS
MYALGIAS: 1
PSYCHIATRIC NEGATIVE: 1
VOMITING: 0
NERVOUS/ANXIOUS: 0
BLOOD IN STOOL: 0
PALPITATIONS: 0
DIAPHORESIS: 0
HEADACHES: 0
EYES NEGATIVE: 1
SHORTNESS OF BREATH: 1
BRUISES/BLEEDS EASILY: 0
FEVER: 0
CHILLS: 0
NAUSEA: 1
DOUBLE VISION: 0
COUGH: 0
ABDOMINAL PAIN: 0
HEARTBURN: 0
LOSS OF CONSCIOUSNESS: 0
FOCAL WEAKNESS: 0
WEAKNESS: 1
DIARRHEA: 0
DIZZINESS: 0
WHEEZING: 0
CONSTIPATION: 0
SEIZURES: 0
HEMOPTYSIS: 0

## 2018-09-03 ASSESSMENT — PAIN SCALES - GENERAL
PAINLEVEL_OUTOF10: 3
PAINLEVEL_OUTOF10: 0

## 2018-09-03 ASSESSMENT — LIFESTYLE VARIABLES
ALCOHOL_USE: NO
EVER_SMOKED: YES

## 2018-09-03 ASSESSMENT — PATIENT HEALTH QUESTIONNAIRE - PHQ9
SUM OF ALL RESPONSES TO PHQ9 QUESTIONS 1 AND 2: 0
1. LITTLE INTEREST OR PLEASURE IN DOING THINGS: NOT AT ALL
2. FEELING DOWN, DEPRESSED, IRRITABLE, OR HOPELESS: NOT AT ALL

## 2018-09-03 NOTE — DISCHARGE PLANNING
Care Transition Team Assessment    Met with pt at bedside. According to pt he lives with his wife, he is independent at baseline and does not use assistive devices. Pt said his wife will provide transport at discharge. No needs identified at this time, CM to follow for needs.    Information Source  Orientation : Oriented x 4  Information Given By: Patient    Readmission Evaluation  Is this a readmission?: No    Interdisciplinary Discharge Planning  Does Admitting Nurse Feel This Could be a Complex Discharge?: No  Primary Care Physician: Pt goes to [facility] with - Patient's Self Care Capacity: Spouse  Support Systems: Spouse / Significant Other  Do You Take your Prescribed Medications Regularly: Yes  Able to Return to Previous ADL's: Yes  Mobility Issues: No  Patient Expects to be Discharged to:: Home  Assistance Needed: No  Durable Medical Equipment: Not Applicable    Discharge Preparedness  What are your discharge supports?: Spouse  Prior Functional Level: Independent with Activities of Daily Living, Independent with Medication Management, Ambulatory  Difficulity with ADLs: None  Difficulity with IADLs: None    Functional Assesment  Prior Functional Level: Independent with Activities of Daily Living, Independent with Medication Management, Ambulatory    Finances  Prescription Coverage: Yes    Discharge Risks or Barriers  Discharge risks or barriers?: No    Anticipated Discharge Information  Anticipated discharge disposition: Home  Discharge Address: [address], [place]  Discharge Contact Phone Number: Spouse [place] [phone]

## 2018-09-03 NOTE — PROGRESS NOTES
Assessment completed. Pt A&Ox4. [place]-hearing aids in place. Respirations are even and unlabored on RA. Pt denies chest pain at this time. States all s/s are resolved. Monitors applied, call light and belongings within reach. POC updated. Pt educated on room and call light, pt verbalized understanding. Communication board updated. Needs met. NPO for stress test.

## 2018-09-03 NOTE — ED NOTES
"See triage note. In addition to note, pt states that he was asleep and awoke to the CP. Pt states that he became 'very hot and I started to sweat'. Pt also states \"when the pain started I felt nauseated\". Denies vomiting. Pt states that the pain started in left side of chest and radiating into his back. Pt also states \"my left arm feels heavy\". Pt denies SOB. Pt placed on cardiac monitor and continuous spO2. Call light in reach. Wife at the bedside. Will continue to monitor.  "

## 2018-09-03 NOTE — ED NOTES
Med rec updated and complete  Allergies reviewed  Pt was not sure the dose of his ATENOLOL, [facility] [phone].  [facility] 25MG AT[facility]OLOL 1 tablet BID, pt reports that he only takes ATENOLOL 25MG once a day, NOT twice.  Pt reports no vitamins.  Pt reports no antibiotics in the last 30 days.

## 2018-09-03 NOTE — ED TRIAGE NOTES
"Pro Kearney  78 y.o.  Blood pressure 148/81, pulse 83, temperature 36.4 °C (97.6 °F), resp. rate 16, height 1.651 m (5' 5\"), weight 80.7 kg (178 lb), SpO2 95 %.    Chief Complaint   Patient presents with   • Chest Pressure     on set 0400, state went to his back between the shoulder blades and into his L arm, L arm feels tiered and heavy     Triage Rn:   Pt ambulates to triage with above complaint, pt denies fever chills, n/v, no distress noted. Hx of pace maker placement x 3. A&Ox4 GCS 15, Pt safe to be returned to lobby, educated on triage process and wait times, instructed to notify any staff of worsening/changing of symptoms.      "

## 2018-09-03 NOTE — H&P
Hospital Medicine History & Physical Note    Date of Service  9/3/2018    Primary Care Physician  Pcp Pt States None    Consultants  None    Code Status  Full code    Chief Complaint  Chest pain    History of Presenting Illness  78 y.o. male who presented 9/3/2018 with chest pain that is 1st awaken him around 3 AM. It is 3 out of 10 in intensity. It is accompanied by radiation into the left shoulder left arm and back. The chest pain is associated with diaphoresis, nausea, shortness of breath. Patient has a history of coronary artery disease. He has not had a stress test for many years he says patient at this point will be admitted to the clinical decision unit he will undergo serial cardiac markers and then have a stress test performed. Depending on stress test results further decision will be made at that point in time.    Review of Systems  Review of Systems   Constitutional: Negative for chills, diaphoresis and fever.   HENT: Negative.    Eyes: Negative.  Negative for double vision.   Respiratory: Positive for shortness of breath. Negative for cough, hemoptysis and wheezing.    Cardiovascular: Positive for chest pain and leg swelling (occasionally in the left leg). Negative for palpitations.   Gastrointestinal: Positive for nausea. Negative for abdominal pain, blood in stool, constipation, diarrhea, heartburn and vomiting.   Genitourinary: Negative.  Negative for frequency, hematuria and urgency.   Musculoskeletal: Positive for myalgias (chest wall, shoulder, back, left arm). Negative for joint pain.   Skin: Negative.  Negative for itching and rash.   Neurological: Positive for weakness. Negative for dizziness, focal weakness, seizures, loss of consciousness and headaches.   Endo/Heme/Allergies: Negative.  Does not bruise/bleed easily.   Psychiatric/Behavioral: Negative.  Negative for suicidal ideas. The patient is not nervous/anxious.    All other systems reviewed and are negative.      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 He also has no past medical history of Cold or Dental disorder.    Surgical History   has a past surgical history that includes appendectomy; cholecystectomy; cataract extraction with iol (Bilateral); recovery (2012); lumbar laminectomy diskectomy (2014); and pacemaker insertion (2012).     Family History  Patient has a family history of cancer in his sister.  Mother passed away with hypertension as well as had some sort of unknown cancer.  His father was his stepfather and he does not know of any medical problems and his stepfather.    Social History   reports that he quit smoking about 49 years ago. His smoking use included Cigarettes. He has a 25.00 pack-year smoking history. He quit smokeless tobacco use about 11 years ago. His smokeless tobacco use included Snuff and Chew. He reports that he does not drink alcohol or use drugs.    Allergies  Allergies   Allergen Reactions   • Rocephin [Ceftriaxone Sodium] Shortness of Breath, Rash and Itching     Anaphylactic type reaction, rapid onset   • Codeine Vomiting   • Clindamycin      C difficile   • Morphine Itching and Nausea   • Shellfish-Derived Products Itching     Shellfish, minor allergy; has had contrast without difficulty       Medications  Prior to Admission Medications   Prescriptions Last Dose Informant Patient Reported? Taking?   ATENOLOL 50 MG PO TABS 5/15/2018 at  2200  Yes No   Si mg by Oral route QDAY.    Aspirin (ASPIR-81 PO) 5/15/2018  Yes No   Sig: Take  by mouth.   OMEPRAZOLE 20 MG PO CPDR 5/15/2018 at 2200  Yes No   Si mg by Oral route QDAY. Cholesterol     RESTASIS 0.05 % ophthalmic emulsion 5/15/2018  Yes No   simvastatin (ZOCOR) 40 MG TABS 5/15/2018 at 2200  No No   Sig: Take 1 Tab by mouth every evening.   tamsulosin (FLOMAX) 0.4 MG capsule 5/15/2018 at 2200  Yes No   Sig: Take 0.4 mg by mouth ONE-HALF HOUR AFTER BREAKFAST.      Facility-Administered Medications: None       Physical Exam  Blood Pressure : 121/77   Temperature: 36.4 °C (97.6 °F)   Pulse: 70   Respiration: 14   Pulse Oximetry: 93 %     Physical Exam   Constitutional: He is oriented to person, place, and time. He appears well-developed and well-nourished.   HENT:   Head: Normocephalic and atraumatic.   Right Ear: External ear normal.   Left Ear: External ear normal.   Nose: Nose normal.   Mouth/Throat: Oropharynx is clear and moist.   Eyes: Pupils are equal, round, and reactive to light. Conjunctivae and EOM are normal.   Neck: Normal range of motion. Neck supple. No JVD present. No thyromegaly present.   Cardiovascular: Regular rhythm.  Tachycardia present.    No murmur heard.  Pulmonary/Chest: He has no wheezes. He has no rales. He exhibits no tenderness.   Abdominal: He exhibits no distension and no mass. There is no tenderness. There is no rebound and no guarding.   Musculoskeletal: Normal range of motion. He exhibits no edema or tenderness.   Lymphadenopathy:     He has no cervical adenopathy.   Neurological: He is alert and oriented to person, place, and time. He has normal reflexes. No cranial nerve deficit.   Skin: Skin is warm and dry. No rash noted. No erythema.   Psychiatric: He has a normal mood and affect.   Nursing note and vitals reviewed.      Laboratory:  Recent Labs      09/03/18   0444   WBC  5.1   RBC  4.44*   HEMOGLOBIN  14.3   HEMATOCRIT  43.3   MCV  97.5   MCH  32.2   MCHC  33.0*   RDW  50.9*   PLATELETCT  175   MPV  9.8     Recent Labs      09/03/18   0444   SODIUM  140   POTASSIUM  3.8   CHLORIDE  108   CO2  26   GLUCOSE  100*   BUN  12   CREATININE  1.16   CALCIUM  9.2      Recent Labs      09/03/18   0444   ALTSGPT  16   ASTSGOT  18   ALKPHOSPHAT  95   TBILIRUBIN  0.5   LIPASE  24   GLUCOSE  100*     Recent Labs      09/03/18   0444   APTT  29.6   INR  1.01             Lab Results   Component Value Date    TROPONINI <0.01 09/03/2018       Urinalysis:    No results found     Imaging:  DX-CHEST-PORTABLE (1 VIEW)   Final Result      No acute cardiac or pulmonary abnormality is noted.      NM-CARDIAC STRESS TEST    (Results Pending)         Assessment/Plan:  I anticipate this patient is appropriate for observation status at this time.    Chest pain   Assessment & Plan    Patient developed chest pain around 3 AM. The chest pain was associated with pressure it was 3 out of 10 intensity. The patient's chest pain radiated into his back his shoulder on the left side and into his left arm. It was accompanied by diaphoresis nausea and shortness of breath. The patient has a history of coronary artery disease, he is also has a history of pacemaker. At this point he'll be admitted to the clinical decision unit he will undergo 3 sets of cardiac markers followed by stress test. Patient will be medically optimized in the meantime and he'll be on oxygen.        Coronary artery calcification seen on CAT scan- (present on admission)   Assessment & Plan    Continue at this point with Zocor as well as aspirin and he will need a beta blocker long-term.        BPH (benign prostatic hyperplasia)- (present on admission)   Assessment & Plan    Continue at this point with Flomax        BMI 30.0-30.9,adult- (present on admission)   Assessment & Plan    Patient will need outpatient weight loss management program including a bariatric consult.        Mixed hyperlipidemia- (present on admission)   Assessment & Plan    Continue with statin, low-fat low-cholesterol diet and check a fasting lipid panel        Cardiac pacemaker in situ- (present on admission)   Assessment & Plan    This is stable if necessary will  interrogate the pacemaker for right now he is at this point a paced rhythm but in the  range        GERD (gastroesophageal reflux disease)- (present on admission)   Assessment & Plan    Continue at this point with omeprazole. He's taking 20 mg daily.            VTE prophylaxis: SCDs

## 2018-09-03 NOTE — ED PROVIDER NOTES
ED Provider Note    CHIEF COMPLAINT  Chief Complaint   Patient presents with   • Chest Pressure     on set 0400, state went to his back between the shoulder blades and into his L arm, L arm feels tiered and heavy       HPI  Pro Kearney is a 78 y.o. male who ambulates to the emergency department with his wife for chest pain.  Patient states he awoke at 4 AM with chest pain, pressure substernal radiating to his back and left shoulder.  Left upper extremity heaviness.  Shortness of breath.  Diaphoresis.  Nausea without vomiting.  No palpitations or syncope.  No abdominal pain.    Patient states symptomatology has mostly resolved before this evaluation.  Denies recent stress testing or angiogram.  Denies history of similar symptoms.  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    Denies recent illness, cough, congestion or fever.    REVIEW OF SYSTEMS  See HPI for further details. All other systems are negative.     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    SOCIAL HISTORY  Social History     Social History Main Topics   • Smoking status: Former Smoker     Packs/day: 1.00     Years: 25.00     Types: Cigarettes     Quit date: 1/1/1969   • Smokeless tobacco: Former User     Types: Snuff, Chew     Quit date: 1/1/2007      Comment: 18 months ago - chewing tobacco   • Alcohol use No   • Drug use: No   • Sexual activity: Yes     Partners: Female       SURGICAL HISTORY   has a past surgical history that includes appendectomy; cholecystectomy; cataract extraction with iol (Bilateral); recovery (11/28/2012); lumbar laminectomy diskectomy (6/25/2014); and pacemaker insertion  "(November 2012).    CURRENT MEDICATIONS  Home Medications    **Home medications have not yet been reviewed for this encounter**         ALLERGIES  Allergies   Allergen Reactions   • Rocephin [Ceftriaxone Sodium] Shortness of Breath, Rash and Itching     Anaphylactic type reaction, rapid onset   • Codeine Vomiting   • Clindamycin      C difficile   • Morphine    • Shellfish-Derived Products Itching     Shellfish, minor allergy; has had contrast without difficulty       PHYSICAL EXAM  VITAL SIGNS: /74   Pulse 70   Temp 36.4 °C (97.6 °F)   Resp 15   Ht 1.651 m (5' 5\")   Wt 80.7 kg (178 lb)   SpO2 93%   BMI 29.62 kg/m²   Pulse ox interpretation: I interpret this pulse ox as normal.  Constitutional: Alert in no apparent distress.  HENT: Normocephalic, atraumatic. Bilateral external ears normal, Nose normal. Moist mucous membranes.    Eyes: Pupils are equal and reactive, Conjunctiva normal.   Neck: Normal range of motion, Supple.  No JVD.  Lymphatic: No lymphadenopathy noted.   Cardiovascular: Regular rate and rhythm, no murmurs. Distal pulses intact.  No peripheral edema.  Thorax & Lungs: Normal breath sounds.  No wheezing/rales/ronchi. No increased work of breathing  Abdomen: Soft, non-distended, non-tender to palpation. No palpable or pulsatile masses.   Skin: Warm, Dry, No erythema, No rash.   Musculoskeletal: Good range of motion in all major joints.   Neurologic: Alert and oriented ×4.  Speech clear and cohesive.  Ambulates independently.  Psychiatric: Affect normal, Judgment normal, Mood normal.       DIAGNOSTIC STUDIES / PROCEDURES    EKG  I interpreted this EKG myself.  This is a 12-lead study.  The rhythm is paced with a rate of 70.  There are no ST segment nor T wave abnormalities.  PVC.  Interpretation: No ST segment elevation myocardial infarction.  Paced.  Unchanged from previous.    LABS  Results for orders placed or performed during the hospital encounter of 09/03/18   CBC WITH DIFFERENTIAL "   Result Value Ref Range    WBC 5.1 4.8 - 10.8 K/uL    RBC 4.44 (L) 4.70 - 6.10 M/uL    Hemoglobin 14.3 14.0 - 18.0 g/dL    Hematocrit 43.3 42.0 - 52.0 %    MCV 97.5 81.4 - 97.8 fL    MCH 32.2 27.0 - 33.0 pg    MCHC 33.0 (L) 33.7 - 35.3 g/dL    RDW 50.9 (H) 35.9 - 50.0 fL    Platelet Count 175 164 - 446 K/uL    MPV 9.8 9.0 - 12.9 fL    Neutrophils-Polys 46.20 44.00 - 72.00 %    Lymphocytes 36.00 22.00 - 41.00 %    Monocytes 9.10 0.00 - 13.40 %    Eosinophils 7.30 (H) 0.00 - 6.90 %    Basophils 1.00 0.00 - 1.80 %    Immature Granulocytes 0.40 0.00 - 0.90 %    Nucleated RBC 0.00 /100 WBC    Neutrophils (Absolute) 2.34 1.82 - 7.42 K/uL    Lymphs (Absolute) 1.82 1.00 - 4.80 K/uL    Monos (Absolute) 0.46 0.00 - 0.85 K/uL    Eos (Absolute) 0.37 0.00 - 0.51 K/uL    Baso (Absolute) 0.05 0.00 - 0.12 K/uL    Immature Granulocytes (abs) 0.02 0.00 - 0.11 K/uL    NRBC (Absolute) 0.00 K/uL   COMP METABOLIC PANEL   Result Value Ref Range    Sodium 140 135 - 145 mmol/L    Potassium 3.8 3.6 - 5.5 mmol/L    Chloride 108 96 - 112 mmol/L    Co2 26 20 - 33 mmol/L    Anion Gap 6.0 0.0 - 11.9    Glucose 100 (H) 65 - 99 mg/dL    Bun 12 8 - 22 mg/dL    Creatinine 1.16 0.50 - 1.40 mg/dL    Calcium 9.2 8.5 - 10.5 mg/dL    AST(SGOT) 18 12 - 45 U/L    ALT(SGPT) 16 2 - 50 U/L    Alkaline Phosphatase 95 30 - 99 U/L    Total Bilirubin 0.5 0.1 - 1.5 mg/dL    Albumin 4.0 3.2 - 4.9 g/dL    Total Protein 6.0 6.0 - 8.2 g/dL    Globulin 2.0 1.9 - 3.5 g/dL    A-G Ratio 2.0 g/dL   TROPONIN   Result Value Ref Range    Troponin I <0.01 0.00 - 0.04 ng/mL   ESTIMATED GFR   Result Value Ref Range    GFR If African American >60 >60 mL/min/1.73 m 2    GFR If Non African American >60 >60 mL/min/1.73 m 2   APTT   Result Value Ref Range    APTT 29.6 24.7 - 36.0 sec   PT/INR   Result Value Ref Range    PT 13.0 12.0 - 14.6 sec    INR 1.01 0.87 - 1.13   LIPASE   Result Value Ref Range    Lipase 24 11 - 82 U/L   EKG (ER)   Result Value Ref Range    Report       [facility] Emergency Dept[facility]    Test Date:  2018  Pt Name:    CLITN RONQUILLO              Department: ER  MRN:        [id number]                      Room:  Gender:     Male                         Technician: [id number]  :        [id number]                   Requested By:ER TRIAGE PROTOCOL  Order #:    [id number]                    Reading MD: ARISTEO SERRANO DO    Measurements  Intervals                                Axis  Rate:       70                           P:  MI:         245                          QRS:        -60  QRSD:       96                           T:          19  QT:         396  QTc:        428    Interpretive Statements  ATRIAL-PACED COMPLEXES  FIRST DEGREE AV BLOCK  LEFT ANTERIOR FASCICULAR BLOCK  Compared to ECG 2017 03:57:11  First degree AV block now present  Ventricular-paced complex(es) or rhythm no longer present  T-wave abnormality no longer present    Electronically Signed On 9-3-2018 7:20:16 PDT by ARISTEO SERRANO DO       RADIOLOGY  DX-CHEST-PORTABLE (1 VIEW)   Final Result      No acute cardiac or pulmonary abnormality is noted.          COURSE & MEDICAL DECISION MAKING  Nursing notes and vital signs were reviewed. (See chart for details)  The patients records were reviewed, history was obtained from the patient;     ED evaluation for chest pain is unrevealing, although symptomatology is concerning for ACS.  Heart score 6.  Symptomatology did improve prior to my evaluation, patient remains pain-free.  Hemodynamically stable, no fever, tachycardia or labile blood pressure.  Never hypoxic.  No clinical or radiographic evidence for pneumonia, pneumothorax or thoracic aortic dissection.  Labs unremarkable, first troponin normal.  EKG with paced rhythm, history of symptomatic bradycardia requiring pacemaker.  No evidence for ischemia, unchanged from previous.  Continuous telemetry without ectopy or arrhythmia.    Patient will be admitted to the hospitalist for  further cardiac evaluation.  Patient has wife are aware the findings and agreeable to the disposition and plan.    7:12 AM Dr. Mena is aware the patient and agreeable to admission.      FINAL IMPRESSION  (R07.9) Chest pain, unspecified type  (primary encounter diagnosis)      Electronically signed by: Pennie Thakur, 9/3/2018 6:59 AM      This dictation was created using voice recognition software. The accuracy of the dictation is limited to the abilities of the software. I expect there may be some errors of grammar and possibly content. The nursing notes were reviewed and certain aspects of this information were incorporated into this note.

## 2018-09-03 NOTE — ASSESSMENT & PLAN NOTE
This is stable if necessary will interrogate the pacemaker for right now he is at this point a paced rhythm but in the  range

## 2018-09-03 NOTE — ED NOTES
Pt resting comfortably in bed on continuous cardiac and pulse ox monitoring, VS stable, pt aaox4, respirations even/unlabored, skin warm/dry, NAD noted. Pt currently denies CP, SOB, nausea, dizziness, diaphoresis. Awaiting inpatient bed.

## 2018-09-03 NOTE — ASSESSMENT & PLAN NOTE
Patient developed chest pain around 3 AM. The chest pain was associated with pressure it was 3 out of 10 intensity. The patient's chest pain radiated into his back his shoulder on the left side and into his left arm. It was accompanied by diaphoresis nausea and shortness of breath. The patient has a history of coronary artery disease, he is also has a history of pacemaker. At this point he'll be admitted to the clinical decision unit he will undergo 3 sets of cardiac markers followed by stress test. Patient will be medically optimized in the meantime and he'll be on oxygen.

## 2018-09-03 NOTE — ED NOTES
KINDER FALL RISK       RISK  Present to ED b/c of fall (syncope, seizure, or ALOC) **no*  Age  >70 *yes**  Altered Mental Status (Intoxicated with alcohol or substance confusion, inability to follow instructions, disorientation) **no*  Impaired Mobility (Ambulates or transfers with assistive devices or assist. Ambulates with unsteady gait and no assistance.  Unable to ambulate to transfer.) *no**  Nursing Judgement (Bowel or bladder incontinence, diarrhea, urinary frequency or urgency, leg weakness, orthostatic hypotension, dizziness or vertigo, narcotic use.) *no**    YES TO ANY RISK = HIGH FALL RISK     Interventions in place marked in RED   1. Move patient closer to nurses stations  2. Familiarize the patient with environment  3. Place call light within reach and demonstrate call light use  4. Keep patients personal possessions within patient safe reach (if appropriate)   5. Place stretcher in low position and brakes locked  6.Place yellow socks and armband on patient   7. Place green triangle on patients door  8. Give patient urinal if applicable  9. Keep floor surfaces clean and dry  10.Keep patient care areas uncluttered  11. Use a lap belt or posey vest   12. Assess patient hourly for :Pain, persona needs, position change, and call light access.

## 2018-09-03 NOTE — ED NOTES
Called pt's wife, [place], at [phone] to inform her of pt's transfer to [id number], left voicemail.

## 2018-09-03 NOTE — ED NOTES
Pt resting comfortably on stretcher. No complaints at this time. Pt is currently pain free. Wife remains at the bedside. Call light remains in reach. Will continue to monitor.

## 2018-09-04 NOTE — DISCHARGE INSTRUCTIONS
Discharge Instructions    Discharged to home by car with relative. Discharged via wheelchair, hospital escort: Refused.  Special equipment needed: Not Applicable    Be sure to schedule a follow-up appointment with your primary care doctor or any specialists as instructed.     Discharge Plan:   Diet Plan: Discussed  Activity Level: Discussed  Confirmed Follow up Appointment: Patient to Call and Schedule Appointment  Confirmed Symptoms Management: Discussed  Medication Reconciliation Updated: Yes  Influenza Vaccine Indication: [facility] Refuses [facility]    I understand that a diet low in cholesterol, fat, and sodium is recommended for good health. Unless I have been given specific instructions below for another diet, I accept this instruction as my diet prescription.   Other diet: heart healthy    Special Instructions: None    · Is patient discharged on Warfarin / Coumadin?   No       Discharge patient Home   Diet regular with 9-13 servings of fruits and vegetables   Activities as tolerated   Follow ups with PCP in 7-10 days, call for appointment   Meds per med rec sheet   No smoking, no alcohol, no caffeine   Wear seat belt in motorized vehicle   Take medications as perscribed   Keep appointments   If symptoms worsen call PCP, 911 or urgent care.        Depression / Suicide Risk    As you are discharged from this [facility] facility, it is important to learn how to keep safe from harming yourself.    Recognize the warning signs:  · Abrupt changes in personality, positive or negative- including increase in energy   · Giving away possessions  · Change in eating patterns- significant weight changes-  positive or negative  · Change in sleeping patterns- unable to sleep or sleeping all the time   · Unwillingness or inability to communicate  · Depression  · Unusual sadness, discouragement and loneliness  · Talk of wanting to die  · Neglect of personal appearance   · Rebelliousness- reckless  behavior  · Withdrawal from people/activities they love  · Confusion- inability to concentrate     If you or a loved one observes any of these behaviors or has concerns about self-harm, here's what you can do:  · Talk about it- your feelings and reasons for harming yourself  · Remove any means that you might use to hurt yourself (examples: pills, rope, extension cords, firearm)  · Get professional help from the community (Mental Health, Substance Abuse, psychological counseling)  · Do not be alone:Call your Safe Contact- someone whom you trust who will be there for you.  · Call your local CRISIS HOTLINE 084-0769 or 282-647-7262  · Call your local Children's Mobile Crisis Response Team [place] (021) 269-7961 or www.[employer]  · Call the toll free National Suicide Prevention Hotlines   · National Suicide Prevention Lifeline 529-728-NWYN (8297)  · National Hope Line Network 800-SUICIDE (688-4424)

## 2018-09-04 NOTE — CONSULTS
Consults       Admission Date / Service Date: 09/03/18    Patient's PCP: Pcp Pt States None    Consult requested by: Dr. Juanpablo Herbert    CC: CP, abnormal stress test       HPI:   Mr. Kearney is a pleasant 70-year-old gentleman who is known to our cardiology group.  He has had a pacemaker placed for sick sinus syndrome.  He is typically atrial paced.  He is known to have some PVCs.  He also has some atherosclerosis of the abdominal aorta.  He has been on primary prevention aspirin and statin therapy as well as medication for hypertension.    He awoke in the morning to some mild chest discomfort feels like a pressure in the substernal and radiating toward the left, was also associated with some heaviness in the left shoulder.  The symptoms went on for a few hours.  He presented to the emergency room and recall still having some shoulder discomfort.  The symptoms went away with aspirin.  He was not given nitroglycerin.  He has had 4 troponins drawn and they are all normal.  BNP is normal.  He has been chest pain-free here.    Data reviewed by me personally:  Current medications  Tele-atrial paced, rare ventricular pacing  ECG 9/3/2018 atrial paced, delayed R-wave progression, left anterior fascicular block, intermittent ventricular pacing, unchanged from presenting EKG  Nuclear stress test 9/3/2018 read as fixed inferior defect, the defect is in the distal inferior wall and is very small and is worse on rest than stress and associated with some diaphragmatic attenuation.  Chest x-ray 9/3/2018 no acute cardiopulmonary process  CT chest 3/15/2016 coronary artery calcification  Lower extremity arterial study April 16, 2015, no aneurysm      Medications / Drug list prior to admission:  No current facility-administered medications on file prior to encounter.      Current Outpatient Prescriptions on File Prior to Encounter   Medication Sig Dispense Refill   • Aspirin (ASPIR-81 PO) Take 1 Tab by mouth every bedtime.     •  RESTASIS 0.05 % ophthalmic emulsion 1 Drop 2 times a day.     • tamsulosin (FLOMAX) 0.4 MG capsule Take 0.4 mg by mouth every bedtime.     • simvastatin (ZOCOR) 40 MG TABS Take 1 Tab by mouth every evening. 30 Tab 6   • OMEPRAZOLE 20 MG PO CPDR Take 20 mg by mouth every bedtime. Cholesterol         Current list of administered Medications:    Current Facility-Administered Medications:   •  [START ON 9/4/2018] aspirin EC (ECOTRIN) tablet 162 mg, 162 mg, Oral, DAILY, Juanpablo Herbert M.D.  •  omeprazole (PRILOSEC) capsule 20 mg, 20 mg, Oral, QHS, Juanpablo Herbert M.D.  •  simvastatin (ZOCOR) tablet 40 mg, 40 mg, Oral, Q EVENING, Juanpablo Herbert M.D., Stopped at 09/03/18 1800  •  tamsulosin (FLOMAX) capsule 0.4 mg, 0.4 mg, Oral, QHS, Juanpablo Herbert M.D.  •  senna-docusate (PERICOLACE or SENOKOT S) 8.6-50 MG per tablet 2 Tab, 2 Tab, Oral, BID, Stopped at 09/03/18 0800 **AND** polyethylene glycol/lytes (MIRALAX) PACKET 1 Packet, 1 Packet, Oral, QDAY PRN **AND** magnesium hydroxide (MILK OF MAGNESIA) suspension 30 mL, 30 mL, Oral, QDAY PRN **AND** bisacodyl (DULCOLAX) suppository 10 mg, 10 mg, Rectal, QDAY PRN, Juanpablo Herbert M.D.  •  acetaminophen (TYLENOL) tablet 650 mg, 650 mg, Oral, Q6HRS PRN, Juanpablo Herbert M.D.  •  labetalol (NORMODYNE,TRANDATE) injection 10 mg, 10 mg, Intravenous, Q4HRS PRN, Juanpablo Herbert M.D.  •  ondansetron (ZOFRAN) syringe/vial injection 4 mg, 4 mg, Intravenous, Q4HRS PRN, Juanpablo Herbert M.D.  •  ondansetron (ZOFRAN ODT) dispertab 4 mg, 4 mg, Oral, Q4HRS PRN, Juanpablo Herbert M.D.  •  artificial tears 1.4 % ophthalmic solution 1-2 Drop, 1-2 Drop, Both Eyes, Q2HRS PRN, Juanpablo Herbert M.D.  •  metoprolol SR (TOPROL XL) tablet 25 mg, 25 mg, Oral, QHS, Juanpablo Herbert M.D.    Past Medical History:   Diagnosis Date   • Arthritis     Generalized   • Back pain June 2014    Status post laminectomy by Dr. Balderrama, with relieft of symptoms.   • CAD (coronary artery disease)     coronary calcification noted on CT  scan   • Cardiac pacemaker in situ     Generator replacement with Medtronic Adapta ADDR01 by Dr. Maldonado, 11/2012. Original device implanted in 1985. RV lead revision in 1995.   • Clostridium difficile infection     complicating clindamycin therapy for otitis   • Hiatus hernia syndrome    • HTN (hypertension)     • Hyperlipidemia    • Lower urinary tract symptoms (LUTS)     BPH   • Nephrolithiasis    • Pneumonia 2000   • Sick sinus syndrome (HCC)    • Unspecified disorder of middle ear and mastoid     Middle ear/mastoid dis, recurrent otitis       Past Surgical History:   Procedure Laterality Date   • LUMBAR LAMINECTOMY DISKECTOMY  6/25/2014    Performed by Ankit Balderrama M.D. at SURGERY [facility] ORS   • RECOVERY  11/28/2012    Performed by Cath-Recovery Surgery at SURGERY SAME DAY [facility] ORS   • PACEMAKER INSERTION  November 2012    Generator replacement with Medtronic Adapta ADDR01 by Dr. Maldonado. Original device implanted in 1985. RV lead revision in 1995.   • APPENDECTOMY     • CATARACT EXTRACTION WITH IOL Bilateral         • CHOLECYSTECTOMY         Family History   Problem Relation Age of Onset   • Other Mother         leukemia   • Other Father         leukemia   • Cancer Sister        Social History     Social History   • Marital status:      Spouse name: N/A   • Number of children: N/A   • Years of education: N/A     Occupational History   • Not on file.     Social History Main Topics   • Smoking status: Former Smoker     Packs/day: 1.00     Years: 25.00     Types: Cigarettes     Quit date: 1/1/1969   • Smokeless tobacco: Former User     Types: Snuff, Chew     Quit date: 1/1/2007      Comment: 18 months ago - chewing tobacco   • Alcohol use No   • Drug use: No   • Sexual activity: Yes     Partners: Female     Other Topics Concern   • Not on file     Social History Narrative   • No narrative on file       Allergies   Allergen Reactions   • Rocephin [Ceftriaxone Sodium] Shortness of Breath, Rash and  "Itching     Anaphylactic type reaction, rapid onset   • Codeine Vomiting   • Clindamycin      C difficile   • Morphine Itching and Nausea   • Shellfish-Derived Products Itching     Shellfish, minor allergy; has had contrast without difficulty       Review of systems:  All others systems reviewed and negative.    Physical exam:  Patient Vitals for the past 24 hrs:   BP Temp Pulse Resp SpO2 Height Weight   09/03/18 1639 136/71 36.8 °C (98.2 °F) 75 16 95 % - -   09/03/18 1256 120/69 36.6 °C (97.9 °F) 68 14 96 % - -   09/03/18 0819 141/77 36.3 °C (97.4 °F) 71 16 94 % - 85.2 kg (187 lb 13.3 oz)   09/03/18 0741 - - 71 17 94 % - -   09/03/18 0700 121/77 - 70 14 93 % - -   09/03/18 0544 126/74 - 70 15 93 % - -   09/03/18 0424 - - - - - - 80.7 kg (178 lb)   09/03/18 0418 148/81 - - - - - -   09/03/18 0417 (!) 131/35 36.4 °C (97.6 °F) 83 16 95 % 1.651 m (5' 5\") -       General: No acute distress. Well nourished.  HEENT: EOM grossly intact, no scleral icterus, no pharyngeal erythema.   Neck:  No JVD, no bruits, trachea midline  CVS: RRR. Normal S1, S2. No M/R/G. No LE edema.  2+ radial pulses, 1+ DT pulses, PM pocket in tact  Resp: CTAB. No wheezing or crackles/rhonchi. Normal respiratory effort.  Abdomen: Soft, NT, no maximilian hepatomegaly.  MSK/Ext: No clubbing or cyanosis.  Skin: Warm and dry, no rashes.  Neurological: CN III-XII grossly intact. No focal deficits.   Psych: A&O x 3, appropriate affect, good judgement    Data:  Laboratory studies:  Lab Results   Component Value Date/Time    WBC 5.1 09/03/2018 04:44 AM    RBC 4.44 (L) 09/03/2018 04:44 AM    HEMOGLOBIN 14.3 09/03/2018 04:44 AM    HEMATOCRIT 43.3 09/03/2018 04:44 AM    MCV 97.5 09/03/2018 04:44 AM    MCH 32.2 09/03/2018 04:44 AM    MCHC 33.0 (L) 09/03/2018 04:44 AM    MPV 9.8 09/03/2018 04:44 AM    NEUTSPOLYS 46.20 09/03/2018 04:44 AM    LYMPHOCYTES 36.00 09/03/2018 04:44 AM    MONOCYTES 9.10 09/03/2018 04:44 AM    EOSINOPHILS 7.30 (H) 09/03/2018 04:44 AM    " BASOPHILS 1.00 09/03/2018 04:44 AM        Lab Results   Component Value Date/Time    SODIUM 140 09/03/2018 04:44 AM    POTASSIUM 3.8 09/03/2018 04:44 AM    CHLORIDE 108 09/03/2018 04:44 AM    CO2 26 09/03/2018 04:44 AM    GLUCOSE 100 (H) 09/03/2018 04:44 AM    BUN 12 09/03/2018 04:44 AM    CREATININE 1.16 09/03/2018 04:44 AM    CREATININE 1.1 10/02/2008 01:05 AM        Recent Labs      09/03/18   0444   ASTSGOT  18   ALTSGPT  16   TBILIRUBIN  0.5   ALKPHOSPHAT  95   GLOBULIN  2.0   INR  1.01       Lab Results   Component Value Date/Time    PROTHROMBTM 13.0 09/03/2018 04:44 AM    INR 1.01 09/03/2018 04:44 AM        Imaging:  NM-CARDIAC STRESS TEST   Final Result      DX-CHEST-PORTABLE (1 VIEW)   Final Result      No acute cardiac or pulmonary abnormality is noted.          Active Problems:    Chest pain POA: Unknown    Coronary artery calcification seen on CAT scan POA: Yes    GERD (gastroesophageal reflux disease) POA: Yes    Cardiac pacemaker in situ POA: Yes      Overview: November 2012: Generator replacement with Medtronic Adapta ADDR01 by Dr. Maldonado. Original device implanted in 1985. RV lead revision in 1995.    Mixed hyperlipidemia POA: Yes    BMI 30.0-30.9,adult POA: Yes    BPH (benign prostatic hyperplasia) POA: Yes  Resolved Problems:    * No resolved hospital problems. *      Assessment / Plan:  1. Atypical chest pain, normal ECG and normal troponin  2. Abnormal nuclear stress test, the defect looks worse in rest and then in stress and is very small in the distal inferior apical wall which is most consistent with diaphragmatic attenuation artifact.  3.  Pacemaker for sick sinus syndrome, atrial paced  4.  Coronary artery calcifications on CT scan  5.  hypertension, controlled  6.  Hyperlipidemia, primary prevention statin therapy    I discussed the case with Greg Elza and .  His troponins and EKG are reassuring.  Even if he has a small defect it would be a fixed defect implying a prior heart  attack which to my knowledge he has not had.    Should he have recurrence of chest pain that is improved with nitroglycerin or worsening chest pain we could always go for heart catheterization.  He knows how to contact the office for issues.    As an outpatient we will switch his atenolol to metoprolol and update his simvastatin to atorvastatin.  We discussed doing this in the hospital but [facility] so we will write paper prescriptions in the office.      Consider an echocardiogram as an outpatient as he has not had one in a while, this was not performed in the hospital as it would have delayed discharge and would not have changed clinical decision making at the time.    I have contacted our office to arrange the appropriate follow-up.      It is my pleasure to participate in the care of Mr. Kearney.  Please do not hesitate to contact me with questions or concerns.    María Sutton MD, [facility]  Cardiologist [facility]    Please note that this dictation was created using voice recognition software. I have made every reasonable attempt to correct obvious errors, but it is possible there are errors of grammar and possibly content that I did not discover before finalizing the note.

## 2018-09-04 NOTE — DISCHARGE SUMMARY
Discharge Summary    CHIEF COMPLAINT ON ADMISSION  Chief Complaint   Patient presents with   • Chest Pressure     on set 0400, state went to his back between the shoulder blades and into his L arm, L arm feels tiered and heavy       Reason for Admission  Chest pressure; back pain; shortne*     Admission Date  9/3/2018    CODE STATUS  Full Code    HPI & HOSPITAL COURSE  This is a 78 y.o. male here with chest pain. The patient had a complete workup. This stresses at this point does show an apical artifact. I had cardiology evaluate the patient. Recommendations are at this point to switch the simvastatin to Lipitor and dispatched the atenolol to metoprolol XL. The [facility] and thus at this point at the next cardiology appointment these will be switched for him. Otherwise the patient with a negative workup can be discharged home with outpatient follow ups and management.       Therefore, he is discharged in good and stable condition to home with close outpatient follow-up.    Discharge Date  9/3/2018    FOLLOW UP ITEMS POST DISCHARGE  Follow-up with cardiology    DISCHARGE DIAGNOSES  Active Problems:    Chest pain POA: Unknown    Coronary artery calcification seen on CAT scan POA: Yes    GERD (gastroesophageal reflux disease) POA: Yes    Cardiac pacemaker in situ POA: Yes      Overview: November 2012: Generator replacement with Medtronic Adapta ADDR01 by Dr. Maldonado. Original device implanted in 1985. RV lead revision in 1995.    Mixed hyperlipidemia POA: Yes    BMI 30.0-30.9,adult POA: Yes    BPH (benign prostatic hyperplasia) POA: Yes  Resolved Problems:    * No resolved hospital problems. *      FOLLOW UP  Future Appointments  Date Time Provider Department Center   12/11/2018 12:40 PM BRIANNA Horan None   12/11/2018 1:00 PM SPENSER Dejesus None     No follow-up provider specified.    MEDICATIONS ON DISCHARGE     Medication List      START  taking these medications      Instructions   nitroglycerin 0.4 MG Subl  Commonly known as:  NITROSTAT   Place 1 Tab under tongue as needed for Chest Pain.  Dose:  0.4 mg        CHANGE how you take these medications      Instructions   atenolol 25 MG Tabs  What changed:  when to take this  Commonly known as:  TENORMIN   Take 1 Tab by mouth every day.  Dose:  25 mg        CONTINUE taking these medications      Instructions   ASPIR-81 PO   Take 1 Tab by mouth every bedtime.  Dose:  1 Tab     FLOMAX 0.4 MG capsule  Generic drug:  tamsulosin   Take 0.4 mg by mouth every bedtime.  Dose:  0.4 mg     omeprazole 20 MG delayed-release capsule  Commonly known as:  PRILOSEC   Take 20 mg by mouth every bedtime. Cholesterol  Dose:  20 mg     RESTASIS 0.05 % ophthalmic emulsion  Generic drug:  cyclosporin   1 Drop 2 times a day.  Dose:  1 Drop     simvastatin 40 MG Tabs  Commonly known as:  ZOCOR   Take 1 Tab by mouth every evening.  Dose:  40 mg            Allergies  Allergies   Allergen Reactions   • Rocephin [Ceftriaxone Sodium] Shortness of Breath, Rash and Itching     Anaphylactic type reaction, rapid onset   • Codeine Vomiting   • Clindamycin      C difficile   • Morphine Itching and Nausea   • Shellfish-Derived Products Itching     Shellfish, minor allergy; has had contrast without difficulty       DIET  Orders Placed This Encounter   Procedures   • Diet Order Regular, Cardiac     Standing Status:   Standing     Number of Occurrences:   1     Order Specific Question:   Diet:     Answer:   Regular [1]     Order Specific Question:   Diet:     Answer:   Cardiac [6]       ACTIVITY  As tolerated.  Weight bearing as tolerated    CONSULTATIONS  Cardiology    PROCEDURES  None    LABORATORY  Lab Results   Component Value Date    SODIUM 140 09/03/2018    POTASSIUM 3.8 09/03/2018    CHLORIDE 108 09/03/2018    CO2 26 09/03/2018    GLUCOSE 100 (H) 09/03/2018    BUN 12 09/03/2018    CREATININE 1.16 09/03/2018    CREATININE 1.1 10/02/2008         Lab Results   Component Value Date    WBC 5.1 09/03/2018    HEMOGLOBIN 14.3 09/03/2018    HEMATOCRIT 43.3 09/03/2018    PLATELETCT 175 09/03/2018        Total time of the discharge process exceeds 40 minutes.

## 2018-09-20 ENCOUNTER — OFFICE VISIT (OUTPATIENT)
Dept: URGENT CARE | Facility: CLINIC | Age: 78
End: 2018-09-20
Payer: MEDICARE

## 2018-09-20 VITALS
RESPIRATION RATE: 16 BRPM | OXYGEN SATURATION: 95 % | DIASTOLIC BLOOD PRESSURE: 80 MMHG | WEIGHT: 189 LBS | SYSTOLIC BLOOD PRESSURE: 118 MMHG | TEMPERATURE: 98.2 F | HEART RATE: 82 BPM | BODY MASS INDEX: 31.49 KG/M2 | HEIGHT: 65 IN

## 2018-09-20 DIAGNOSIS — J40 BRONCHITIS: ICD-10-CM

## 2018-09-20 PROCEDURE — 99214 OFFICE O/P EST MOD 30 MIN: CPT | Performed by: PHYSICIAN ASSISTANT

## 2018-09-20 RX ORDER — BENZONATATE 100 MG/1
200 CAPSULE ORAL 3 TIMES DAILY PRN
Qty: 30 CAP | Refills: 0 | Status: SHIPPED | OUTPATIENT
Start: 2018-09-20 | End: 2018-10-09

## 2018-09-20 RX ORDER — DOXYCYCLINE HYCLATE 100 MG
100 TABLET ORAL 2 TIMES DAILY
Qty: 10 TAB | Refills: 0 | Status: SHIPPED | OUTPATIENT
Start: 2018-09-20 | End: 2018-09-25

## 2018-09-20 ASSESSMENT — ENCOUNTER SYMPTOMS
EYE REDNESS: 0
VOMITING: 0
SHORTNESS OF BREATH: 0
NECK PAIN: 0
HEADACHES: 0
WHEEZING: 1
SORE THROAT: 1
COUGH: 1
SPUTUM PRODUCTION: 1
CHILLS: 0
EYE DISCHARGE: 0
ORTHOPNEA: 0
DIZZINESS: 0
MYALGIAS: 0
FEVER: 0
ABDOMINAL PAIN: 0
TINGLING: 0
DIARRHEA: 0

## 2018-09-20 NOTE — PROGRESS NOTES
Subjective:      Pro Kearney is a 78 y.o. male who presents with Cough (x 3 days, productive cough, chest congestion, wheezing and shortness of breath) and Sinus Problem (x nasal congestion, stuffy and runny nose)            Patient is a very pleasant 78-year-old male who presents today with cough, congestion for the last 3 days.  Patient reports prior history of same in the past with bronchitis of which patient is concerned as he is about to leave this weekend to see his grandchildren play baseball.  He denies any fevers, chills.  Patient does report taking leftover Tessalon Perles at home with mild relief however believes that these might be .      Cough   This is a new problem. Episode onset: 3 days ago. The problem has been gradually worsening. The problem occurs every few minutes. The cough is productive of sputum. Associated symptoms include ear congestion, nasal congestion, postnasal drip, a sore throat and wheezing. Pertinent negatives include no chills, ear pain, eye redness, fever, headaches, myalgias, rash or shortness of breath. Nothing aggravates the symptoms. Treatments tried: tessalon. His past medical history is significant for bronchitis, environmental allergies and pneumonia.   Sinus Problem   Associated symptoms include congestion, coughing and a sore throat. Pertinent negatives include no chills, ear pain, headaches, neck pain or shortness of breath.       Review of Systems   Constitutional: Positive for malaise/fatigue. Negative for chills and fever.   HENT: Positive for congestion, postnasal drip and sore throat. Negative for ear discharge and ear pain.    Eyes: Negative for discharge and redness.   Respiratory: Positive for cough, sputum production and wheezing. Negative for shortness of breath.    Cardiovascular: Negative for orthopnea.        Denies any new leg swelling     Gastrointestinal: Negative for abdominal pain, diarrhea and vomiting.   Genitourinary: Negative for  "dysuria and urgency.   Musculoskeletal: Negative for myalgias and neck pain.   Skin: Negative for itching and rash.   Neurological: Negative for dizziness, tingling and headaches.   Endo/Heme/Allergies: Positive for environmental allergies.          Objective:     /80 (BP Location: Left arm, Patient Position: Sitting, BP Cuff Size: Adult)   Pulse 82   Temp 36.8 °C (98.2 °F)   Resp 16   Ht 1.651 m (5' 5\")   Wt 85.7 kg (189 lb)   SpO2 95%   BMI 31.45 kg/m²    PMH:  has a past medical history of Arthritis; Back pain (June 2014); CAD (coronary artery disease); Cardiac pacemaker in situ; Clostridium difficile infection; Hiatus hernia syndrome; HTN (hypertension) ( ); Hyperlipidemia; Lower urinary tract symptoms (LUTS); Nephrolithiasis; Pneumonia (2000); Sick sinus syndrome (HCC); and Unspecified disorder of middle ear and mastoid. He also has no past medical history of Cold or Dental disorder.  MEDS:   Current Outpatient Prescriptions:   •  NON SPECIFIED, Currently taking another medication for high blood pressure, Disp: , Rfl:   •  doxycycline (VIBRAMYCIN) 100 MG Tab, Take 1 Tab by mouth 2 times a day for 5 days., Disp: 10 Tab, Rfl: 0  •  benzonatate (TESSALON) 100 MG Cap, Take 2 Caps by mouth 3 times a day as needed for Cough., Disp: 30 Cap, Rfl: 0  •  nitroglycerin (NITROSTAT) 0.4 MG SL Tab, Place 1 Tab under tongue as needed for Chest Pain., Disp: 25 Tab, Rfl: 2  •  Aspirin (ASPIR-81 PO), Take 1 Tab by mouth every bedtime., Disp: , Rfl:   •  tamsulosin (FLOMAX) 0.4 MG capsule, Take 0.4 mg by mouth every bedtime., Disp: , Rfl:   •  simvastatin (ZOCOR) 40 MG TABS, Take 1 Tab by mouth every evening., Disp: 30 Tab, Rfl: 6  •  atenolol (TENORMIN) 25 MG Tab, Take 1 Tab by mouth every day., Disp: 30 Tab, Rfl: 0  •  RESTASIS 0.05 % ophthalmic emulsion, 1 Drop 2 times a day., Disp: , Rfl:   •  OMEPRAZOLE 20 MG PO CPDR, Take 20 mg by mouth every bedtime. Cholesterol, Disp: , Rfl:   ALLERGIES:   Allergies "   Allergen Reactions   • Rocephin [Ceftriaxone Sodium] Shortness of Breath, Rash and Itching     Anaphylactic type reaction, rapid onset   • Codeine Vomiting   • Clindamycin      C difficile   • Morphine Itching and Nausea   • Shellfish-Derived Products Itching     Shellfish, minor allergy; has had contrast without difficulty     SURGHX:   Past Surgical History:   Procedure Laterality Date   • LUMBAR LAMINECTOMY DISKECTOMY  6/25/2014    Performed by Ankit Balderrama M.D. at SURGERY [facility] ORS   • RECOVERY  11/28/2012    Performed by Cath-Recovery Surgery at SURGERY SAME DAY [facility] ORS   • PACEMAKER INSERTION  November 2012    Generator replacement with Medtronic Adapta ADDR01 by Dr. Maldonado. Original device implanted in 1985. RV lead revision in 1995.   • APPENDECTOMY     • CATARACT EXTRACTION WITH IOL Bilateral         • CHOLECYSTECTOMY       SOCHX:  reports that he quit smoking about 49 years ago. His smoking use included Cigarettes. He has a 25.00 pack-year smoking history. He quit smokeless tobacco use about 11 years ago. His smokeless tobacco use included Snuff and Chew. He reports that he does not drink alcohol or use drugs.  FH: Family history was reviewed, no pertinent findings to report    Physical Exam   Constitutional: He is oriented to person, place, and time. He appears well-developed and well-nourished.   HENT:   Head: Normocephalic and atraumatic.   Mouth/Throat: No oropharyngeal exudate.   Ears- Canals clear- TM- with clear fluid effusions bilaterally.   Pos. PND, with slight erythema- without tonsillar edema or exudate.   Mild discharge noted bilaterally- to nares.      Eyes: Pupils are equal, round, and reactive to light. EOM are normal.   Neck: Normal range of motion. Neck supple.   Cardiovascular: Normal rate.    Pulmonary/Chest: Effort normal and breath sounds normal. No respiratory distress.   Musculoskeletal: Normal range of motion. He exhibits no tenderness.   Lymphadenopathy:     He has  no cervical adenopathy.   Neurological: He is alert and oriented to person, place, and time.   Skin: Skin is warm. No rash noted.   Psychiatric: He has a normal mood and affect. His behavior is normal.   Vitals reviewed.              Assessment/Plan:     1. Bronchitis    - doxycycline (VIBRAMYCIN) 100 MG Tab; Take 1 Tab by mouth 2 times a day for 5 days.  Dispense: 10 Tab; Refill: 0  - benzonatate (TESSALON) 100 MG Cap; Take 2 Caps by mouth 3 times a day as needed for Cough.  Dispense: 30 Cap; Refill: 0    Discussed importance of wearing sunscreen and staying out of the sun with Doxycycline.   Encouraged OTC supportive meds PRN. Humidification, increase fluids, avoid night time dairy.   Patient given precautionary s/sx that mandate immediate follow up and evaluation in the ED. Advised of risks of not doing so.    DDX, Supportive care, and indications for immediate follow-up discussed with patient.    Instructed to return to clinic or nearest emergency department if we are not available for any change in condition, further concerns, or worsening of symptoms.    The patient demonstrated a good understanding and agreed with the treatment plan.

## 2018-10-09 ENCOUNTER — OFFICE VISIT (OUTPATIENT)
Dept: CARDIOLOGY | Facility: MEDICAL CENTER | Age: 78
End: 2018-10-09
Payer: MEDICARE

## 2018-10-09 VITALS
SYSTOLIC BLOOD PRESSURE: 122 MMHG | HEIGHT: 65 IN | HEART RATE: 76 BPM | OXYGEN SATURATION: 92 % | DIASTOLIC BLOOD PRESSURE: 66 MMHG | WEIGHT: 184 LBS | BODY MASS INDEX: 30.66 KG/M2

## 2018-10-09 DIAGNOSIS — Z95.0 CARDIAC PACEMAKER IN SITU: ICD-10-CM

## 2018-10-09 DIAGNOSIS — I49.5 SICK SINUS SYNDROME (HCC): ICD-10-CM

## 2018-10-09 DIAGNOSIS — I20.89 STABLE ANGINA PECTORIS: ICD-10-CM

## 2018-10-09 DIAGNOSIS — I10 ESSENTIAL HYPERTENSION, BENIGN: ICD-10-CM

## 2018-10-09 DIAGNOSIS — E78.2 MIXED HYPERLIPIDEMIA: ICD-10-CM

## 2018-10-09 DIAGNOSIS — I25.10 CORONARY ARTERY CALCIFICATION SEEN ON CAT SCAN: ICD-10-CM

## 2018-10-09 PROCEDURE — 99214 OFFICE O/P EST MOD 30 MIN: CPT | Performed by: NURSE PRACTITIONER

## 2018-10-09 RX ORDER — METOPROLOL SUCCINATE 25 MG/1
25 TABLET, EXTENDED RELEASE ORAL DAILY
COMMUNITY
End: 2019-06-25

## 2018-10-09 ASSESSMENT — ENCOUNTER SYMPTOMS
SHORTNESS OF BREATH: 0
LOSS OF CONSCIOUSNESS: 0
COUGH: 0
NAUSEA: 0
CHILLS: 0
HEADACHES: 0
ORTHOPNEA: 0
BRUISES/BLEEDS EASILY: 0
FEVER: 0
MYALGIAS: 0
PALPITATIONS: 0
ABDOMINAL PAIN: 0
PND: 0
DIZZINESS: 0

## 2018-10-09 NOTE — PROGRESS NOTES
Chief Complaint   Patient presents with   • Hospital Follow-up   • Chest Pain       Subjective:   Pro Kearney is a 78 y.o. male who presents today for ER visit for chest pain.    Pro is a 78 year old male with history of SSS with PM since 1985, most recent generator replacement in 2012; he also has hypertension, hyperlipidemia, CAD and aortic calcification seen on CT scan, previously followed by Dr. Goss.    Last month, he had an episode of chest pain, and presented to [facility]. He did have an MPI done, which showed a small area of apical ischemia, but Dr. Sutton did not think further testing was necessary. He was given NTG to use PRN, and Atenolol was changed to Toprol XL.    He is here today for follow-up. He has not had any further episodes of chest pain, arm/neck/jaw pain. In fact, he walks daily with his dog, and also helps  his grandsons' baseball teams, and has not had any problems. He has not needed NTG.  No palpitations; no shortness of breath, no orthopnea or PND; no dizziness or syncope; mild edema, which is stable with support socks. BP is stable at home.    Past Medical History:   Diagnosis Date   • Arthritis     Generalized   • Back pain June 2014    Status post laminectomy by Dr. Balderrama, with relieft of symptoms.   • CAD (coronary artery disease)     coronary calcification noted on CT scan   • Cardiac pacemaker in situ     Generator replacement with Medtronic Adapta ADDR01 by Dr. Maldonado, 11/2012. Original device implanted in 1985. RV lead revision in 1995.   • Clostridium difficile infection     complicating clindamycin therapy for otitis   • Hiatus hernia syndrome    • HTN (hypertension)     • Hyperlipidemia    • Lower urinary tract symptoms (LUTS)     BPH   • Nephrolithiasis    • Pneumonia 2000   • Sick sinus syndrome (HCC)    • Unspecified disorder of middle ear and mastoid     Middle ear/mastoid dis, recurrent otitis     Past Surgical History:   Procedure  Laterality Date   • LUMBAR LAMINECTOMY DISKECTOMY  6/25/2014    Performed by Ankit Balderrama M.D. at SURGERY [facility] ORS   • RECOVERY  11/28/2012    Performed by Cath-Recovery Surgery at SURGERY SAME DAY [facility] ORS   • PACEMAKER INSERTION  November 2012    Generator replacement with Medtronic Adapta ADDR01 by Dr. Maldonado. Original device implanted in 1985. RV lead revision in 1995.   • APPENDECTOMY     • CATARACT EXTRACTION WITH IOL Bilateral         • CHOLECYSTECTOMY       Family History   Problem Relation Age of Onset   • Other Mother         leukemia   • Other Father         leukemia   • Cancer Sister      Social History     Social History   • Marital status:      Spouse name: N/A   • Number of children: N/A   • Years of education: N/A     Occupational History   • Not on file.     Social History Main Topics   • Smoking status: Former Smoker     Packs/day: 1.00     Years: 25.00     Types: Cigarettes     Quit date: 1/1/1969   • Smokeless tobacco: Former User     Types: Snuff, Chew     Quit date: 1/1/2007      Comment: 18 months ago - chewing tobacco   • Alcohol use No   • Drug use: No   • Sexual activity: Yes     Partners: Female     Other Topics Concern   • Not on file     Social History Narrative   • No narrative on file     Allergies   Allergen Reactions   • Rocephin [Ceftriaxone Sodium] Shortness of Breath, Rash and Itching     Anaphylactic type reaction, rapid onset   • Codeine Vomiting   • Clindamycin      C difficile   • Morphine Itching and Nausea   • Shellfish-Derived Products Itching     Shellfish, minor allergy; has had contrast without difficulty     Outpatient Encounter Prescriptions as of 10/9/2018   Medication Sig Dispense Refill   • metoprolol SR (TOPROL XL) 25 MG TABLET SR 24 HR Take 25 mg by mouth every day.     • RESTASIS 0.05 % ophthalmic emulsion 1 Drop 2 times a day.     • tamsulosin (FLOMAX) 0.4 MG capsule Take 0.4 mg by mouth every bedtime.     • OMEPRAZOLE 20 MG PO CPDR Take 20 mg  "by mouth every bedtime. Cholesterol     • [DISCONTINUED] NON SPECIFIED Currently taking another medication for high blood pressure     • [DISCONTINUED] benzonatate (TESSALON) 100 MG Cap Take 2 Caps by mouth 3 times a day as needed for Cough. (Patient not taking: Reported on 10/9/2018) 30 Cap 0   • nitroglycerin (NITROSTAT) 0.4 MG SL Tab Place 1 Tab under tongue as needed for Chest Pain. 25 Tab 2   • [DISCONTINUED] atenolol (TENORMIN) 25 MG Tab Take 1 Tab by mouth every day. (Patient not taking: Reported on 10/9/2018) 30 Tab 0   • Aspirin (ASPIR-81 PO) Take 1 Tab by mouth every bedtime.     • simvastatin (ZOCOR) 40 MG TABS Take 1 Tab by mouth every evening. 30 Tab 6     No facility-administered encounter medications on file as of 10/9/2018.      Review of Systems   Constitutional: Negative for chills and fever.   HENT: Negative for congestion.    Respiratory: Negative for cough and shortness of breath.    Cardiovascular: Negative for chest pain, palpitations, orthopnea, leg swelling and PND.   Gastrointestinal: Negative for abdominal pain and nausea.   Musculoskeletal: Negative for myalgias.   Skin: Negative for rash.   Neurological: Negative for dizziness, loss of consciousness and headaches.   Endo/Heme/Allergies: Does not bruise/bleed easily.        Objective:   /66 (BP Location: Left arm, Patient Position: Sitting, BP Cuff Size: Adult)   Pulse 76   Ht 1.651 m (5' 5\")   Wt 83.5 kg (184 lb)   SpO2 92%   BMI 30.62 kg/m²     Physical Exam   Constitutional: He is oriented to person, place, and time. He appears well-developed and well-nourished.   HENT:   Head: Normocephalic.   Eyes: EOM are normal.   Neck: Normal range of motion. Neck supple. No JVD present.   Cardiovascular: Normal rate, regular rhythm and normal heart sounds.    Pulmonary/Chest: Effort normal and breath sounds normal. No respiratory distress. He has no wheezes. He has no rales.   PM in right chest wall.   Abdominal: Soft. Bowel sounds are " normal. He exhibits no distension. There is no tenderness.   Musculoskeletal: Normal range of motion. He exhibits edema.   Trace edema to the ankles bilaterally.   Neurological: He is alert and oriented to person, place, and time.   Skin: Skin is warm and dry. No rash noted.   Psychiatric: He has a normal mood and affect.     NUCLEAR IMAGING INTERPRETATION OF MPI OF 9/3/2018:   Normal left ventricular size, ejection fraction, and wall motion.   There is an apparent small area of ischemia (reversible defect) in the apical region.    Non-reversible defect noted.     Lab Results   Component Value Date/Time    SODIUM 140 09/03/2018 04:44 AM    POTASSIUM 3.8 09/03/2018 04:44 AM    CHLORIDE 108 09/03/2018 04:44 AM    CO2 26 09/03/2018 04:44 AM    GLUCOSE 100 (H) 09/03/2018 04:44 AM    BUN 12 09/03/2018 04:44 AM    CREATININE 1.16 09/03/2018 04:44 AM    CREATININE 1.1 10/02/2008 01:05 AM     Lab Results   Component Value Date/Time    WBC 5.1 09/03/2018 04:44 AM    RBC 4.44 (L) 09/03/2018 04:44 AM    HEMOGLOBIN 14.3 09/03/2018 04:44 AM    HEMATOCRIT 43.3 09/03/2018 04:44 AM    MCV 97.5 09/03/2018 04:44 AM    MCH 32.2 09/03/2018 04:44 AM    MCHC 33.0 (L) 09/03/2018 04:44 AM    MPV 9.8 09/03/2018 04:44 AM      Lab Results   Component Value Date/Time    TROPONINI <0.01 09/03/2018 01:00 PM    CKMB 4.3 01/04/2011 07:30 PM        Assessment:     1. Stable angina pectoris (HCC)     2. Coronary artery calcification seen on CAT scan     3. Essential hypertension, benign     4. Mixed hyperlipidemia     5. Cardiac pacemaker in situ     6. Sick sinus syndrome (HCC)         Medical Decision Making:  Today's Assessment / Status / Plan:     1. Episode of chest pain, with small area of apical ischemia on MPI in September 2018. He has not had any further episodes or symptoms; he has not had to use NTG. Continue to use Toprol, and carry NTG with him.    2. Hypertension, treated and stable. BP is good today.    3. Hyperlipidemia, treated with  Zocor.    4. Sick sinus syndrome with PPM, to be checked in December 2018.    He is doing well, and has not had any recurrence of symptoms. Same medications for now. Keep December 2018 FU for PM check, as well as routine follow-up with Dr. Campbell. FU sooner if clinical condition changes.    Collaborating MD: Cierra

## 2018-12-11 ENCOUNTER — OFFICE VISIT (OUTPATIENT)
Dept: CARDIOLOGY | Facility: MEDICAL CENTER | Age: 78
End: 2018-12-11
Payer: MEDICARE

## 2018-12-11 ENCOUNTER — NON-PROVIDER VISIT (OUTPATIENT)
Dept: CARDIOLOGY | Facility: MEDICAL CENTER | Age: 78
End: 2018-12-11
Payer: MEDICARE

## 2018-12-11 VITALS
OXYGEN SATURATION: 93 % | SYSTOLIC BLOOD PRESSURE: 126 MMHG | DIASTOLIC BLOOD PRESSURE: 70 MMHG | BODY MASS INDEX: 30.66 KG/M2 | HEIGHT: 65 IN | WEIGHT: 184 LBS | HEART RATE: 88 BPM

## 2018-12-11 DIAGNOSIS — I25.10 CORONARY ARTERY CALCIFICATION SEEN ON CAT SCAN: ICD-10-CM

## 2018-12-11 DIAGNOSIS — I77.819 DILATION OF DESCENDING AORTA (HCC): ICD-10-CM

## 2018-12-11 DIAGNOSIS — I10 ESSENTIAL HYPERTENSION, BENIGN: ICD-10-CM

## 2018-12-11 DIAGNOSIS — I49.5 SICK SINUS SYNDROME (HCC): ICD-10-CM

## 2018-12-11 DIAGNOSIS — Z95.0 CARDIAC PACEMAKER IN SITU: ICD-10-CM

## 2018-12-11 DIAGNOSIS — E78.2 MIXED HYPERLIPIDEMIA: ICD-10-CM

## 2018-12-11 PROCEDURE — 93280 PM DEVICE PROGR EVAL DUAL: CPT | Performed by: NURSE PRACTITIONER

## 2018-12-11 PROCEDURE — 99213 OFFICE O/P EST LOW 20 MIN: CPT | Performed by: INTERNAL MEDICINE

## 2018-12-11 ASSESSMENT — ENCOUNTER SYMPTOMS
HEARTBURN: 1
BRUISES/BLEEDS EASILY: 1

## 2018-12-11 NOTE — PROGRESS NOTES
Cardiology Follow-up Consultation Note    Date of note:    12/11/2018    Primary Care Provider: Pcp Pt States None  Referring Provider: Lori Mays A.P.N./Wolfgang    Patient Name: Pro Kearney   YOB: 1940  MRN:              [id number]    Chief Complaint: chest pain    History of Present Illness: Pro Kearney is a 78 y.o. male whose current medical problems include SSS s/p pacemaker, hypertension, dyslipidemia, and coronary artery calcification by CT scan who is here for follow-up.    Last seen by Lori Mays on 10/9/2018.    Interim Events:  Has not needed nitroglycerin.     In terms of hypertension, BP well controlled.     In terms of pacemaker, no issues. No palpitations.     In terms of dyslipidemia, [facility]     He found on this past Friday that he has a sister.      Review of Systems   HENT: Positive for hearing loss.    Cardiovascular: Positive for leg swelling.   Hematologic/Lymphatic: Bruises/bleeds easily.   Musculoskeletal: Positive for joint pain.   Gastrointestinal: Positive for heartburn.       All other systems reviewed and discussed using a comprehensive questionnaire and are negative.       Past Medical History:   Diagnosis Date   • Arthritis     Generalized   • Back pain June 2014    Status post laminectomy by Dr. Balderrama, with relieft of symptoms.   • CAD (coronary artery disease)     coronary calcification noted on CT scan   • Cardiac pacemaker in situ     Generator replacement with Medtronic Adapta ADDR01 by Dr. Maldonado, 11/2012. Original device implanted in 1985. RV lead revision in 1995.   • Clostridium difficile infection     complicating clindamycin therapy for otitis   • Hiatus hernia syndrome    • HTN (hypertension)     • Hyperlipidemia    • Lower urinary tract symptoms (LUTS)     BPH   • Nephrolithiasis    • Pneumonia 2000   • Sick sinus syndrome (HCC)    • Unspecified disorder of middle ear and mastoid     Middle ear/mastoid  dis, recurrent otitis         Past Surgical History:   Procedure Laterality Date   • LUMBAR LAMINECTOMY DISKECTOMY  6/25/2014    Performed by Ankit Balderrama M.D. at SURGERY [facility] ORS   • RECOVERY  11/28/2012    Performed by Cath-Recovery Surgery at SURGERY SAME DAY [facility] ORS   • PACEMAKER INSERTION  November 2012    Generator replacement with Medtronic Adapta ADDR01 by Dr. Maldonado. Original device implanted in 1985. RV lead revision in 1995.   • APPENDECTOMY     • CATARACT EXTRACTION WITH IOL Bilateral         • CHOLECYSTECTOMY           Current Outpatient Prescriptions   Medication Sig Dispense Refill   • metoprolol SR (TOPROL XL) 25 MG TABLET SR 24 HR Take 25 mg by mouth every day.     • nitroglycerin (NITROSTAT) 0.4 MG SL Tab Place 1 Tab under tongue as needed for Chest Pain. 25 Tab 2   • Aspirin (ASPIR-81 PO) Take 1 Tab by mouth every bedtime.     • RESTASIS 0.05 % ophthalmic emulsion 1 Drop 2 times a day.     • tamsulosin (FLOMAX) 0.4 MG capsule Take 0.4 mg by mouth every bedtime.     • simvastatin (ZOCOR) 40 MG TABS Take 1 Tab by mouth every evening. 30 Tab 6   • OMEPRAZOLE 20 MG PO CPDR Take 20 mg by mouth every bedtime. Cholesterol       No current facility-administered medications for this visit.          Allergies   Allergen Reactions   • Rocephin [Ceftriaxone Sodium] Shortness of Breath, Rash and Itching     Anaphylactic type reaction, rapid onset   • Codeine Vomiting   • Clindamycin      C difficile   • Morphine Itching and Nausea   • Shellfish-Derived Products Itching     Shellfish, minor allergy; has had contrast without difficulty         Family History   Problem Relation Age of Onset   • Other Mother         leukemia   • Other Father         leukemia   • Cancer Sister          Social History     Social History   • Marital status:      Spouse name: N/A   • Number of children: N/A   • Years of education: N/A     Occupational History   • Not on file.     Social History Main Topics   •  "Smoking status: Former Smoker     Packs/day: 1.00     Years: 25.00     Types: Cigarettes     Quit date: 1/1/1969   • Smokeless tobacco: Former User     Types: Snuff, Chew     Quit date: 1/1/2007      Comment: 18 months ago - chewing tobacco   • Alcohol use No   • Drug use: No   • Sexual activity: Yes     Partners: Female     Other Topics Concern   • Not on file     Social History Narrative   • No narrative on file         Physical Exam:  Ambulatory Vitals  Blood pressure 126/70, pulse 88, height 1.651 m (5' 5\"), SpO2 93 %.   Oxygen Therapy:  Pulse Oximetry: 93 %  BP Readings from Last 4 Encounters:   12/11/18 126/70   10/09/18 122/66   09/20/18 118/80   09/03/18 136/71       Weight/BMI: Body mass index is 30.62 kg/m².  Wt Readings from Last 4 Encounters:   10/09/18 83.5 kg (184 lb)   09/20/18 85.7 kg (189 lb)   09/03/18 85.2 kg (187 lb 13.3 oz)   05/15/18 81.6 kg (180 lb)       General: No apparent distress  Eyes: nl conjunctiva  ENT: OP clear, normal external appearance of nose and ears  Neck: JVP 7-8 cm H2O, no carotid bruits  Lungs: normal respiratory effort, CTAB  Heart: RRR, no murmurs, no rubs or gallops, 1+ edema bilateral lower extremities. No LV/RV heave on cardiac palpatation. 2+ bilateral radial pulses.  2+ bilateral dp pulses.   Abdomen: soft, non tender, non distended, no masses, normal bowel sounds.  No HSM.  Extremities/MSK: no clubbing, no cyanosis  Neurological: No focal sensory deficits  Psychiatric: Appropriate affect, A/O x 3, intact judgement and insight  Skin: Warm extremities        Lab Data Review:  Lab Results   Component Value Date/Time    CHOLSTRLTOT 159 02/19/2015 09:01 AM    LDL 73 02/19/2015 09:01 AM    HDL 34 (A) 02/19/2015 09:01 AM    TRIGLYCERIDE 262 (H) 02/19/2015 09:01 AM       Lab Results   Component Value Date/Time    SODIUM 140 09/03/2018 04:44 AM    POTASSIUM 3.8 09/03/2018 04:44 AM    CHLORIDE 108 09/03/2018 04:44 AM    CO2 26 09/03/2018 04:44 AM    GLUCOSE 100 (H) 09/03/2018 " 04:44 AM    BUN 12 2018 04:44 AM    CREATININE 1.16 2018 04:44 AM    CREATININE 1.1 10/02/2008 01:05 AM     Lab Results   Component Value Date/Time    ALKPHOSPHAT 95 2018 04:44 AM    ASTSGOT 18 2018 04:44 AM    ALTSGPT 16 2018 04:44 AM    TBILIRUBIN 0.5 2018 04:44 AM      Lab Results   Component Value Date/Time    WBC 5.1 2018 04:44 AM     No components found for: HBGA1C  No components found for: TROPONIN  No components found for: BNP      Cardiac Imaging and Procedures Review:    EKG dated 9/3/2018:  Atrial paced rhythm with long IA interval   Possible old anterior MI   LAFB   Intermittent V pacing       Nuclear Perfusion Imaging (9/3/2018):    Myocardial Perfusion   Report   NUCLEAR IMAGING INTERPRETATION   Normal left ventricular size, ejection fraction, and wall motion.   There is an apparent small area of ischemia (reversible defect) in the apical      region.    Non-reversible defect noted.      D/w Dr. Herbert   Nondiagnostic ECG portion of a Regadenoson nuclear stress test.   Atrial paced rhythm.   ECG INTERPRETATION   Nondiagnostic ECG portion of a Regadenoson nuclear stress test.      Radiology test Review:  CXR: 9/3/2018    FINDINGS:  Right bipolar transvenous pacing device remains in place.    The cardiac silhouette is within normal limits.    No infiltrates or consolidations are noted.    No pleural effusion is noted.    Abdominal US 10/27/2017:  Vascular Laboratory   CONCLUSIONS   No evidence of abdominal aortic or bilateral iliac aneurysm.       Medical Decision Makin. Cardiac pacemaker in situ  F/u with EP    2. Mixed hyperlipidemia  Continue statin, f/[facility] lipid [facility]    3. Sick sinus syndrome (HCC)  F/u with EP, no symptoms    4. Essential hypertension, benign  Well controlled    5. Dilation of descending aorta (HCC)  No viewed on any of the previous CT scans or ultrasounds.  No need for further monitoring.     6. Coronary artery calcification seen  on CAT scan  Continue aspirin/statin    7. BMI 30.0-30.9,adult  discused dietary changes to weight loss.    8. Leg swelling - mild, likely due to excessive salt intake he admits to.  Discussed no salt seasonings.       Return in about 6 months (around 6/11/2019).      Jaron Campbell MD, [facility], [address]  [address] [place] [id number]  Phone: [phone]  Fax: [phone]

## 2018-12-11 NOTE — PROGRESS NOTES
Device is working normally. 85 mode switching episodes (all <3 seconds, <0.1% of total time).  Normal sensing and capture of RA and RV leads; stable impedances. Battery longevity is 3 years.  No changes are made today.    FU in 6 months for next PM check with me.    He does see Dr. Campbell today too.    Collaborating MD: Adrian

## 2019-02-03 ENCOUNTER — APPOINTMENT (OUTPATIENT)
Dept: RADIOLOGY | Facility: MEDICAL CENTER | Age: 79
End: 2019-02-03
Attending: EMERGENCY MEDICINE
Payer: MEDICARE

## 2019-02-03 ENCOUNTER — HOSPITAL ENCOUNTER (EMERGENCY)
Facility: MEDICAL CENTER | Age: 79
End: 2019-02-03
Attending: EMERGENCY MEDICINE
Payer: MEDICARE

## 2019-02-03 VITALS
WEIGHT: 184.08 LBS | DIASTOLIC BLOOD PRESSURE: 73 MMHG | SYSTOLIC BLOOD PRESSURE: 130 MMHG | TEMPERATURE: 97.9 F | HEIGHT: 65 IN | OXYGEN SATURATION: 96 % | RESPIRATION RATE: 24 BRPM | HEART RATE: 72 BPM | BODY MASS INDEX: 30.67 KG/M2

## 2019-02-03 DIAGNOSIS — R06.00 DYSPNEA, UNSPECIFIED TYPE: ICD-10-CM

## 2019-02-03 DIAGNOSIS — R07.89 OTHER CHEST PAIN: ICD-10-CM

## 2019-02-03 LAB
ALBUMIN SERPL BCP-MCNC: 4.5 G/DL (ref 3.2–4.9)
ALBUMIN/GLOB SERPL: 2 G/DL
ALP SERPL-CCNC: 100 U/L (ref 30–99)
ALT SERPL-CCNC: 19 U/L (ref 2–50)
ANION GAP SERPL CALC-SCNC: 7 MMOL/L (ref 0–11.9)
APTT PPP: 29.4 SEC (ref 24.7–36)
AST SERPL-CCNC: 20 U/L (ref 12–45)
BASOPHILS # BLD AUTO: 0.9 % (ref 0–1.8)
BASOPHILS # BLD: 0.05 K/UL (ref 0–0.12)
BILIRUB SERPL-MCNC: 0.6 MG/DL (ref 0.1–1.5)
BNP SERPL-MCNC: 20 PG/ML (ref 0–100)
BUN SERPL-MCNC: 14 MG/DL (ref 8–22)
CALCIUM SERPL-MCNC: 9.3 MG/DL (ref 8.5–10.5)
CHLORIDE SERPL-SCNC: 108 MMOL/L (ref 96–112)
CO2 SERPL-SCNC: 26 MMOL/L (ref 20–33)
CREAT SERPL-MCNC: 1.03 MG/DL (ref 0.5–1.4)
EKG IMPRESSION: NORMAL
EOSINOPHIL # BLD AUTO: 0.31 K/UL (ref 0–0.51)
EOSINOPHIL NFR BLD: 5.6 % (ref 0–6.9)
ERYTHROCYTE [DISTWIDTH] IN BLOOD BY AUTOMATED COUNT: 49.8 FL (ref 35.9–50)
GLOBULIN SER CALC-MCNC: 2.2 G/DL (ref 1.9–3.5)
GLUCOSE SERPL-MCNC: 92 MG/DL (ref 65–99)
HCT VFR BLD AUTO: 48.6 % (ref 42–52)
HGB BLD-MCNC: 15.7 G/DL (ref 14–18)
IMM GRANULOCYTES # BLD AUTO: 0.03 K/UL (ref 0–0.11)
IMM GRANULOCYTES NFR BLD AUTO: 0.5 % (ref 0–0.9)
INR PPP: 1 (ref 0.87–1.13)
LIPASE SERPL-CCNC: 14 U/L (ref 11–82)
LYMPHOCYTES # BLD AUTO: 1.9 K/UL (ref 1–4.8)
LYMPHOCYTES NFR BLD: 34.2 % (ref 22–41)
MCH RBC QN AUTO: 32.2 PG (ref 27–33)
MCHC RBC AUTO-ENTMCNC: 32.3 G/DL (ref 33.7–35.3)
MCV RBC AUTO: 99.6 FL (ref 81.4–97.8)
MONOCYTES # BLD AUTO: 0.52 K/UL (ref 0–0.85)
MONOCYTES NFR BLD AUTO: 9.4 % (ref 0–13.4)
NEUTROPHILS # BLD AUTO: 2.74 K/UL (ref 1.82–7.42)
NEUTROPHILS NFR BLD: 49.4 % (ref 44–72)
NRBC # BLD AUTO: 0 K/UL
NRBC BLD-RTO: 0 /100 WBC
PLATELET # BLD AUTO: 211 K/UL (ref 164–446)
PMV BLD AUTO: 10.1 FL (ref 9–12.9)
POTASSIUM SERPL-SCNC: 3.9 MMOL/L (ref 3.6–5.5)
PROT SERPL-MCNC: 6.7 G/DL (ref 6–8.2)
PROTHROMBIN TIME: 13.3 SEC (ref 12–14.6)
RBC # BLD AUTO: 4.88 M/UL (ref 4.7–6.1)
SODIUM SERPL-SCNC: 141 MMOL/L (ref 135–145)
TROPONIN I SERPL-MCNC: <0.01 NG/ML (ref 0–0.04)
WBC # BLD AUTO: 5.6 K/UL (ref 4.8–10.8)

## 2019-02-03 PROCEDURE — 83690 ASSAY OF LIPASE: CPT

## 2019-02-03 PROCEDURE — 99285 EMERGENCY DEPT VISIT HI MDM: CPT

## 2019-02-03 PROCEDURE — 80053 COMPREHEN METABOLIC PANEL: CPT

## 2019-02-03 PROCEDURE — 85610 PROTHROMBIN TIME: CPT

## 2019-02-03 PROCEDURE — 71045 X-RAY EXAM CHEST 1 VIEW: CPT

## 2019-02-03 PROCEDURE — 83880 ASSAY OF NATRIURETIC PEPTIDE: CPT

## 2019-02-03 PROCEDURE — 84484 ASSAY OF TROPONIN QUANT: CPT

## 2019-02-03 PROCEDURE — 85025 COMPLETE CBC W/AUTO DIFF WBC: CPT

## 2019-02-03 PROCEDURE — 93005 ELECTROCARDIOGRAM TRACING: CPT

## 2019-02-03 PROCEDURE — 36415 COLL VENOUS BLD VENIPUNCTURE: CPT

## 2019-02-03 PROCEDURE — 85730 THROMBOPLASTIN TIME PARTIAL: CPT

## 2019-02-03 PROCEDURE — 93005 ELECTROCARDIOGRAM TRACING: CPT | Performed by: EMERGENCY MEDICINE

## 2019-02-03 NOTE — DISCHARGE INSTRUCTIONS
Return immediately to the Emergency Department if you experience continuing or worsening discomfort in your chest, any difficulty breathing, back pain, abdominal pain, or any other new or worsening symptoms.

## 2019-02-03 NOTE — ED PROVIDER NOTES
ED Provider Note    CHIEF COMPLAINT  Chief Complaint   Patient presents with   • Shortness of Breath     with exertion   • Chest Pressure   • Shoulder Pain     Bilateral        HPI  Pro Kearney is a 78 y.o. male who presents for evaluation of chest pain associated with shortness of breath, this is been intermittently present over the past 2 days, associated with exertion, he has had some nausea with this as well and describes an achy sensation in his bilateral shoulders.  He does have a history of sick sinus syndrome status post pacemaker placement about 40 years ago, he also recently underwent a stress test and is under the care of the cardiology team.  He states that he was prescribed nitroglycerin but was afraid to take it because he knows that if it worked it was an indication that the pain was caused by his heart and he was afraid of that.  He denies any vomiting, no abdominal pain and no back pain.  Currently he is totally chest pain-free.  He took a full aspirin.    REVIEW OF SYSTEMS  Negative for fever, rash, abdominal pain, vomiting, diarrhea, headache, focal weakness, focal numbness, focal tingling, back pain. All other systems are negative.     PAST MEDICAL HISTORY  Past Medical History:   Diagnosis Date   • Arthritis     Generalized   • Back pain June 2014    Status post laminectomy by Dr. Balderrama, with relieft of symptoms.   • Calcification of coronary artery     found by CT scan.    • Cardiac pacemaker in situ     Generator replacement with Medtronic Adapta ADDR01 by Dr. Maldonado, 11/2012. Original device implanted in 1985. RV lead revision in 1995.   • Clostridium difficile infection     complicating clindamycin therapy for otitis   • Hiatus hernia syndrome    • HTN (hypertension)     • Hyperlipidemia    • Lower urinary tract symptoms (LUTS)     BPH   • Nephrolithiasis    • Pneumonia 2000   • Sick sinus syndrome (HCC)    • Unspecified disorder of middle ear and mastoid     Middle ear/mastoid dis,  "recurrent otitis       FAMILY HISTORY  Family History   Problem Relation Age of Onset   • Other Mother         leukemia   • Other Father         leukemia   • Cancer Sister        SOCIAL HISTORY  Social History   Substance Use Topics   • Smoking status: Former Smoker     Packs/day: 1.00     Years: 25.00     Types: Cigarettes     Quit date: 1/1/1969   • Smokeless tobacco: Former User     Types: Snuff, Chew     Quit date: 1/1/2007      Comment: 18 months ago - chewing tobacco   • Alcohol use No       SURGICAL HISTORY  Past Surgical History:   Procedure Laterality Date   • LUMBAR LAMINECTOMY DISKECTOMY  6/25/2014    Performed by Ankit Balderrama M.D. at SURGERY [facility] ORS   • RECOVERY  11/28/2012    Performed by Cath-Recovery Surgery at SURGERY SAME DAY [facility] ORS   • PACEMAKER INSERTION  November 2012    Generator replacement with [employer] Adapta ADDR01 by Dr. Maldonado. Original device implanted in 1985. RV lead revision in 1995.   • APPENDECTOMY     • CATARACT EXTRACTION WITH IOL Bilateral         • CHOLECYSTECTOMY         CURRENT MEDICATIONS  I personally reviewed the medication list in the charting documentation.     ALLERGIES  Allergies   Allergen Reactions   • Rocephin [Ceftriaxone Sodium] Shortness of Breath, Rash and Itching     Anaphylactic type reaction, rapid onset   • Codeine Vomiting   • Clindamycin      C difficile   • Morphine Itching and Nausea   • Shellfish-Derived Products Itching     Shellfish, minor allergy; has had contrast without difficulty       MEDICAL RECORD  I have reviewed patient's medical record and pertinent results are listed above.      PHYSICAL EXAM  VITAL SIGNS: /73   Pulse 75   Temp 36.6 °C (97.9 °F) (Temporal)   Resp 19   Ht 1.651 m (5' 5\")   Wt 83.5 kg (184 lb 1.4 oz)   SpO2 93%   BMI 30.63 kg/m²    Constitutional: Well appearing patient in no acute distress.  Not toxic, nor ill in appearance.  HENT: Mucus membranes moist.    Eyes: No scleral icterus. Normal " conjunctiva   Neck: Supple, comfortable, nonpainful range of motion.   Cardiovascular: Regular heart rate and rhythm.   Thorax & Lungs: Pacer in the right chest, chest is nontender.  Lungs are clear to auscultation with good air movement bilaterally.  No wheeze, rhonchi, nor rales.   Abdomen: Soft, with no tenderness, rebound nor guarding.  No mass or pulsatile mass appreciated.  Skin: Warm, dry. No rash appreciated  Extremities/Musculoskeletal: Asymmetric edema of the left lower extremity, chronic, no asymmetric tenderness, neurovascularly intact.  Neurologic: Alert & oriented. No focal deficits observed.   Psychiatric: Normal affect appropriate for the clinical situation.    DIAGNOSTIC STUDIES / PROCEDURES    EKG  *12 Lead EKG interpreted by me to show:  Rate 85  Rhythm: Atrial paced  Axis: Left  CT and QRS Intervals: Normal  T waves: No acute changes  ST segments: No acute changes  Ectopy: None.  My impression of this EKG: Does not indicate acute ischemia at this time.    LABS  Results for orders placed or performed during the hospital encounter of 02/03/19   Troponin   Result Value Ref Range    Troponin I <0.01 0.00 - 0.04 ng/mL   Btype Natriuretic Peptide   Result Value Ref Range    B Natriuretic Peptide 20 0 - 100 pg/mL   CBC with Differential   Result Value Ref Range    WBC 5.6 4.8 - 10.8 K/uL    RBC 4.88 4.70 - 6.10 M/uL    Hemoglobin 15.7 14.0 - 18.0 g/dL    Hematocrit 48.6 42.0 - 52.0 %    MCV 99.6 (H) 81.4 - 97.8 fL    MCH 32.2 27.0 - 33.0 pg    MCHC 32.3 (L) 33.7 - 35.3 g/dL    RDW 49.8 35.9 - 50.0 fL    Platelet Count 211 164 - 446 K/uL    MPV 10.1 9.0 - 12.9 fL    Neutrophils-Polys 49.40 44.00 - 72.00 %    Lymphocytes 34.20 22.00 - 41.00 %    Monocytes 9.40 0.00 - 13.40 %    Eosinophils 5.60 0.00 - 6.90 %    Basophils 0.90 0.00 - 1.80 %    Immature Granulocytes 0.50 0.00 - 0.90 %    Nucleated RBC 0.00 /100 WBC    Neutrophils (Absolute) 2.74 1.82 - 7.42 K/uL    Lymphs (Absolute) 1.90 1.00 - 4.80 K/uL     Monos (Absolute) 0.52 0.00 - 0.85 K/uL    Eos (Absolute) 0.31 0.00 - 0.51 K/uL    Baso (Absolute) 0.05 0.00 - 0.12 K/uL    Immature Granulocytes (abs) 0.03 0.00 - 0.11 K/uL    NRBC (Absolute) 0.00 K/uL   Complete Metabolic Panel (CMP)   Result Value Ref Range    Sodium 141 135 - 145 mmol/L    Potassium 3.9 3.6 - 5.5 mmol/L    Chloride 108 96 - 112 mmol/L    Co2 26 20 - 33 mmol/L    Anion Gap 7.0 0.0 - 11.9    Glucose 92 65 - 99 mg/dL    Bun 14 8 - 22 mg/dL    Creatinine 1.03 0.50 - 1.40 mg/dL    Calcium 9.3 8.5 - 10.5 mg/dL    AST(SGOT) 20 12 - 45 U/L    ALT(SGPT) 19 2 - 50 U/L    Alkaline Phosphatase 100 (H) 30 - 99 U/L    Total Bilirubin 0.6 0.1 - 1.5 mg/dL    Albumin 4.5 3.2 - 4.9 g/dL    Total Protein 6.7 6.0 - 8.2 g/dL    Globulin 2.2 1.9 - 3.5 g/dL    A-G Ratio 2.0 g/dL   Prothrombin Time   Result Value Ref Range    PT 13.3 12.0 - 14.6 sec    INR 1.00 0.87 - 1.13   APTT   Result Value Ref Range    APTT 29.4 24.7 - 36.0 sec   Lipase   Result Value Ref Range    Lipase 14 11 - 82 U/L   ESTIMATED GFR   Result Value Ref Range    GFR If African American >60 >60 mL/min/1.73 m 2    GFR If Non African American >60 >60 mL/min/1.73 m 2   EKG   Result Value Ref Range    Report       [facility]    Test Date:  2019  Pt Name:    CLINT RONQUILLO              Department: ER  MRN:        [id number]                      Room:  Gender:     Male                         Technician: [id number]  :        [id number]                   Requested By:ER TRIAGE PROTOCOL  Order #:    [id number]                    Reading MD: ZAC ARMENDARIZ MD    Measurements  Intervals                                Axis  Rate:       85                           P:  WA:         216                          QRS:        -57  QRSD:       98                           T:          14  QT:         388  QTc:        462    Interpretive Statements  12 Lead EKG interpreted by me to show:  Rate 85  Rhythm: Atrial paced  Axis:  Left  DC and QRS Intervals: Normal  T waves: No acute changes  ST segments: No acute changes  Ectopy: None.  My impression of this EKG: Does not indicate acute ischemia at this time.  Electronically Signed On 2-3-2019 12:44:50 PST by NETO OROPEZA MD          RADIOLOGY  DX-CHEST-LIMITED (1 VIEW)   Final Result      No acute cardiopulmonary disease.            COURSE & MEDICAL DECISION MAKING  I have reviewed any medical record information, laboratory studies and radiographic results as noted above.  Differential diagnoses includes: Angina, ACS, anemia, electrolyte abnormalities, dehydration, pneumothorax    Encounter Summary: This is a 78 y.o. male with chest pain shortness of breath intimately present for the past 2 days, worse with exertion, associated with nausea, has been prescribed nitro by his cardiologist but was afraid to use it.  Currently has no pain, his EKG shows he is chronically paced rhythm without any evidence of ischemia, his exam is otherwise unremarkable, he does have chronic asymmetric leg edema but nothing acute.  We will check blood work including a troponin, an x-ray and ultimately consult cardiology since they know him. ------ essentially unremarkable workup, troponin negative, x-ray is okay, I discussed the case Dr. Andrade, cardiology, patient is stable and appropriate for discharge and outpatient follow-up, strict return instructions discussed      DISPOSITION: Discharged home in stable condition      FINAL IMPRESSION  1. Other chest pain    2. Dyspnea, unspecified type           This dictation was created using voice recognition software. The accuracy of the dictation is limited to the abilities of the software. I expect there may be some errors of grammar and possibly content. The nursing notes were reviewed and certain aspects of this information were incorporated into this note.    Electronically signed by: Neto Oropeza, 2/3/2019 12:31 PM

## 2019-02-03 NOTE — ED TRIAGE NOTES
Taken to triage from EKG by wheelchair.   Chief Complaint   Patient presents with   • Shortness of Breath     with exertion   • Chest Pressure   • Shoulder Pain     Bilateral    States above symptoms since yesterday. 3/10 chest pressure and SOB with exertion. States recent stress test with possible coronary blockage. Was given a prescription for SL nitroglycerine tablets, however states he is afraid to take it because he was told if it works to come straight to ED. VSS, NAD, anxious. VSS.  Hx of pacemaker for bradycardia.

## 2019-02-03 NOTE — ED NOTES
Received orders for DC. PIV removed and dressing applied. Educated regarding f/u with cardiology. VSS. Refuses wheelchair. Ambulatory to lobby with steady gait.

## 2019-02-03 NOTE — ED NOTES
"Patient pleasant, appears anxious. States \"lincoln I hope Im Ok, I hope it's not my heart\". Patient states he was scared to take his nitro this morning because \"if I take it and the pain goes away, it means it's my heart:\". Patient does not appear in visible distress. Pox 98% on RA.   "

## 2019-06-20 ENCOUNTER — NON-PROVIDER VISIT (OUTPATIENT)
Dept: CARDIOLOGY | Facility: MEDICAL CENTER | Age: 79
End: 2019-06-20
Payer: MEDICARE

## 2019-06-20 ENCOUNTER — TELEPHONE (OUTPATIENT)
Dept: CARDIOLOGY | Facility: MEDICAL CENTER | Age: 79
End: 2019-06-20

## 2019-06-20 DIAGNOSIS — Z95.0 CARDIAC PACEMAKER IN SITU: ICD-10-CM

## 2019-06-20 PROCEDURE — 93280 PM DEVICE PROGR EVAL DUAL: CPT | Performed by: INTERNAL MEDICINE

## 2019-06-20 NOTE — TELEPHONE ENCOUNTER
Fyi: routine pmc-wnl.  1-PAF episode on 1- for 12 mins, asymptomatic.  To be scanned for review.  rtc in 4 months for pmc.

## 2019-06-24 NOTE — TELEPHONE ENCOUNTER
Looks like patient had a 12 minute AFib episode (seen on remote monitoring since last office interrogation).  Would he willing to increase Toprol from 25mg to 50mg?  Keep October 2019 FU as scheduled.  Thanks, AB

## 2019-06-24 NOTE — TELEPHONE ENCOUNTER
Pt. calls back. Yes he's agreeable. [facility] so wants a written RX he can take there. He's positive he already cuts a 50 mg tab in half (gets from [facility] this way) He will call me tomorrow to confirm he hasn't been cutting a 25 mg in half.

## 2019-06-25 RX ORDER — METOPROLOL SUCCINATE 50 MG/1
50 TABLET, EXTENDED RELEASE ORAL DAILY
Qty: 90 TAB | Refills: 3 | Status: SHIPPED | OUTPATIENT
Start: 2019-06-25

## 2019-06-25 NOTE — TELEPHONE ENCOUNTER
Pt called answering service and left message that he currently has 50 mg pills at home from [facility] that he cuts in half. New Rx for 50 mg take one daily printed for Lori's signature & will be mailed to pt.

## 2019-07-13 ENCOUNTER — APPOINTMENT (OUTPATIENT)
Dept: RADIOLOGY | Facility: MEDICAL CENTER | Age: 79
End: 2019-07-13
Attending: EMERGENCY MEDICINE
Payer: MEDICARE

## 2019-07-13 ENCOUNTER — HOSPITAL ENCOUNTER (EMERGENCY)
Facility: MEDICAL CENTER | Age: 79
End: 2019-07-13
Attending: EMERGENCY MEDICINE
Payer: MEDICARE

## 2019-07-13 VITALS
SYSTOLIC BLOOD PRESSURE: 111 MMHG | HEART RATE: 66 BPM | HEIGHT: 65 IN | WEIGHT: 180.78 LBS | DIASTOLIC BLOOD PRESSURE: 64 MMHG | OXYGEN SATURATION: 95 % | TEMPERATURE: 97.5 F | RESPIRATION RATE: 18 BRPM | BODY MASS INDEX: 30.12 KG/M2
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
## 2019-07-13 DIAGNOSIS — S20.212A CONTUSION OF LEFT CHEST WALL, INITIAL ENCOUNTER: ICD-10-CM

## 2019-07-13 LAB
ALBUMIN SERPL BCP-MCNC: 4 G/DL (ref 3.2–4.9)
ALBUMIN/GLOB SERPL: 2.2 G/DL
ALP SERPL-CCNC: 124 U/L (ref 30–99)
ALT SERPL-CCNC: 30 U/L (ref 2–50)
ANION GAP SERPL CALC-SCNC: 9 MMOL/L (ref 0–11.9)
AST SERPL-CCNC: 45 U/L (ref 12–45)
BASOPHILS # BLD AUTO: 0.7 % (ref 0–1.8)
BASOPHILS # BLD: 0.07 K/UL (ref 0–0.12)
BILIRUB SERPL-MCNC: 1.5 MG/DL (ref 0.1–1.5)
BUN SERPL-MCNC: 15 MG/DL (ref 8–22)
CALCIUM SERPL-MCNC: 8.8 MG/DL (ref 8.4–10.2)
CHLORIDE SERPL-SCNC: 110 MMOL/L (ref 96–112)
CO2 SERPL-SCNC: 22 MMOL/L (ref 20–33)
CREAT SERPL-MCNC: 1.23 MG/DL (ref 0.5–1.4)
EOSINOPHIL # BLD AUTO: 0.4 K/UL (ref 0–0.51)
EOSINOPHIL NFR BLD: 3.8 % (ref 0–6.9)
ERYTHROCYTE [DISTWIDTH] IN BLOOD BY AUTOMATED COUNT: 49.1 FL (ref 35.9–50)
GLOBULIN SER CALC-MCNC: 1.8 G/DL (ref 1.9–3.5)
GLUCOSE SERPL-MCNC: 88 MG/DL (ref 65–99)
HCT VFR BLD AUTO: 45 % (ref 42–52)
HGB BLD-MCNC: 15.1 G/DL (ref 14–18)
IMM GRANULOCYTES # BLD AUTO: 0.07 K/UL (ref 0–0.11)
IMM GRANULOCYTES NFR BLD AUTO: 0.7 % (ref 0–0.9)
LYMPHOCYTES # BLD AUTO: 1.77 K/UL (ref 1–4.8)
LYMPHOCYTES NFR BLD: 16.7 % (ref 22–41)
MCH RBC QN AUTO: 32.2 PG (ref 27–33)
MCHC RBC AUTO-ENTMCNC: 33.6 G/DL (ref 33.7–35.3)
MCV RBC AUTO: 95.9 FL (ref 81.4–97.8)
MONOCYTES # BLD AUTO: 0.78 K/UL (ref 0–0.85)
MONOCYTES NFR BLD AUTO: 7.4 % (ref 0–13.4)
NEUTROPHILS # BLD AUTO: 7.5 K/UL (ref 1.82–7.42)
NEUTROPHILS NFR BLD: 70.7 % (ref 44–72)
NRBC # BLD AUTO: 0 K/UL
NRBC BLD-RTO: 0 /100 WBC
PLATELET # BLD AUTO: 198 K/UL (ref 164–446)
PMV BLD AUTO: 10.6 FL (ref 9–12.9)
POTASSIUM SERPL-SCNC: 5.1 MMOL/L (ref 3.6–5.5)
PROT SERPL-MCNC: 5.8 G/DL (ref 6–8.2)
RBC # BLD AUTO: 4.69 M/UL (ref 4.7–6.1)
SODIUM SERPL-SCNC: 141 MMOL/L (ref 135–145)
WBC # BLD AUTO: 10.6 K/UL (ref 4.8–10.8)

## 2019-07-13 PROCEDURE — 99285 EMERGENCY DEPT VISIT HI MDM: CPT

## 2019-07-13 PROCEDURE — 96374 THER/PROPH/DIAG INJ IV PUSH: CPT | Mod: XU

## 2019-07-13 PROCEDURE — 36415 COLL VENOUS BLD VENIPUNCTURE: CPT

## 2019-07-13 PROCEDURE — 85025 COMPLETE CBC W/AUTO DIFF WBC: CPT

## 2019-07-13 PROCEDURE — 700117 HCHG RX CONTRAST REV CODE 255: Performed by: EMERGENCY MEDICINE

## 2019-07-13 PROCEDURE — 700111 HCHG RX REV CODE 636 W/ 250 OVERRIDE (IP): Performed by: EMERGENCY MEDICINE

## 2019-07-13 PROCEDURE — 96375 TX/PRO/DX INJ NEW DRUG ADDON: CPT

## 2019-07-13 PROCEDURE — 71260 CT THORAX DX C+: CPT

## 2019-07-13 PROCEDURE — 80053 COMPREHEN METABOLIC PANEL: CPT

## 2019-07-13 RX ORDER — ATORVASTATIN CALCIUM 40 MG/1
40 TABLET, FILM COATED ORAL NIGHTLY
COMMUNITY

## 2019-07-13 RX ORDER — KETOROLAC TROMETHAMINE 30 MG/ML
15 INJECTION, SOLUTION INTRAMUSCULAR; INTRAVENOUS ONCE
Status: COMPLETED | OUTPATIENT
Start: 2019-07-13 | End: 2019-07-13

## 2019-07-13 RX ORDER — OMEPRAZOLE 20 MG/1
20 CAPSULE, DELAYED RELEASE ORAL
COMMUNITY

## 2019-07-13 RX ORDER — ONDANSETRON 2 MG/ML
4 INJECTION INTRAMUSCULAR; INTRAVENOUS ONCE
Status: COMPLETED | OUTPATIENT
Start: 2019-07-13 | End: 2019-07-13

## 2019-07-13 RX ADMIN — ONDANSETRON HYDROCHLORIDE 4 MG: 2 INJECTION, SOLUTION INTRAMUSCULAR; INTRAVENOUS at 12:15

## 2019-07-13 RX ADMIN — IOHEXOL 100 ML: 350 INJECTION, SOLUTION INTRAVENOUS at 13:05

## 2019-07-13 RX ADMIN — KETOROLAC TROMETHAMINE 15 MG: 30 INJECTION, SOLUTION INTRAMUSCULAR at 13:39

## 2019-07-13 NOTE — DISCHARGE INSTRUCTIONS
Follow-up with your doctor and orthopedics this upcoming week for recheck.  Tylenol for pain.  An occasional ibuprofen on a full stomach will also help.  Return to the ER for new symptoms or any turn for the worse.

## 2019-07-13 NOTE — ED NOTES
Med Rec completed per patient and home pharmacy ([facility])   Allergies reviewed  No ORAL antibiotics in last 14 days

## 2019-07-13 NOTE — ED PROVIDER NOTES
"ED Provider Note    CHIEF COMPLAINT  Chief Complaint   Patient presents with   • T-5000 FALL   • Shoulder Injury   • Rib Injury   • Leg Injury       HPI  Pro Kearney is a 78 y.o. male who presents complaining of chest pain as well as other uncomfortable spots after a fall.  About 730 this morning, he had a fall down some concrete stairs.  His primary issues he is quite a bit of pain in his left chest over his ribs.  Hurts to take a deep breath but is not short of breath.  Denies any belly pain.  He has had no blood in his urine \"since my last kidney stone.\"  Denies any head injury or headache.  No neck pain.  He had pre-existing arthritis and rotator cuff issues in his left shoulder, he exacerbated that.  Does not think he broke anything here.  He did also bang up his legs, but again does not think anything is broken here.  He is able to walk without difficulty.    PAST MEDICAL HISTORY  Past Medical History:   Diagnosis Date   • Arthritis     Generalized   • Back pain June 2014    Status post laminectomy by Dr. Balderrama, with relieft of symptoms.   • Calcification of coronary artery     found by CT scan.    • Cardiac pacemaker in situ     Generator replacement with Medtronic Adapta ADDR01 by Dr. Maldonado, 11/2012. Original device implanted in 1985. RV lead revision in 1995.   • Clostridium difficile infection     complicating clindamycin therapy for otitis   • Hiatus hernia syndrome    • HTN (hypertension)     • Hyperlipidemia    • Lower urinary tract symptoms (LUTS)     BPH   • Nephrolithiasis    • Pneumonia 2000   • Sick sinus syndrome (HCC)    • Unspecified disorder of middle ear and mastoid     Middle ear/mastoid dis, recurrent otitis       FAMILY HISTORY  Family History   Problem Relation Age of Onset   • Other Mother         leukemia   • Other Father         leukemia   • Cancer Sister        SOCIAL HISTORY  Social History   Substance Use Topics   • Smoking status: Former Smoker     Packs/day: 1.00     " "Years: 25.00     Types: Cigarettes     Quit date: 1/1/1969   • Smokeless tobacco: Former User     Types: Snuff, Chew     Quit date: 1/1/2007      Comment: 18 months ago - chewing tobacco   • Alcohol use No         SURGICAL HISTORY  Past Surgical History:   Procedure Laterality Date   • LUMBAR LAMINECTOMY DISKECTOMY  6/25/2014    Performed by Ankit Balderrama M.D. at SURGERY [facility] ORS   • RECOVERY  11/28/2012    Performed by Cath-Recovery Surgery at SURGERY SAME DAY [facility] ORS   • PACEMAKER INSERTION  November 2012    Generator replacement with Medtronic Adapta ADDR01 by Dr. Maldonado. Original device implanted in 1985. RV lead revision in 1995.   • APPENDECTOMY     • CATARACT EXTRACTION WITH IOL Bilateral         • CHOLECYSTECTOMY         CURRENT MEDICATIONS  Home Medications    **Home medications have not yet been reviewed for this encounter**         I have reviewed the nurses notes and/or the list brought with the patient.    ALLERGIES  Allergies   Allergen Reactions   • Rocephin [Ceftriaxone Sodium] Shortness of Breath, Rash and Itching     Anaphylactic type reaction, rapid onset   • Codeine Vomiting   • Clindamycin      C difficile   • Morphine Itching and Nausea   • Shellfish-Derived Products Itching     Shellfish, minor allergy; has had contrast without difficulty       REVIEW OF SYSTEMS  See HPI for further details. Review of systems as above, otherwise all other systems are negative.     PHYSICAL EXAM  VITAL SIGNS: /64   Pulse 73   Temp 36.4 °C (97.5 °F) (Temporal)   Resp 18   Ht 1.651 m (5' 5\")   Wt 82 kg (180 lb 12.4 oz)   SpO2 96%   BMI 30.08 kg/m²     Constitutional: Well appearing patient in no acute distress.  Not toxic, nor ill in appearance.  HENT: Mucus membranes moist.  Oropharynx is clear.  Eyes: Pupils equally round.  No scleral icterus.   Neck: Full nontender range of motion.  Lymphatic: No cervical lymphadenopathy noted.   Cardiovascular: Regular heart rate and rhythm.  No " murmurs, rubs, nor gallop appreciated.   Thorax & Lungs: Chest is tender over the left lateral and left posterior thorax.  Lungs are clear to auscultation with good air movement bilaterally.  No wheeze, rhonchi, nor rales.   Abdomen: Soft, with no tenderness, rebound nor guarding.  No mass, pulsatile mass, nor hepatosplenomegaly appreciated.  No CVA tenderness.  Skin: scattered bruises  Extremities/Musculoskeletal: He has good passive range of motion of the left shoulder, active ranging is uncomfortable.  He has a contusion over the distal third of the leg on the righ.  There is no bony tenderness here.  Neurologic: Alert & oriented.  Strength and sensation is intact all around.  Gait is normal.  Psychiatric: Normal affect appropriate for the clinical situation.    LABS  Labs Reviewed   CBC WITH DIFFERENTIAL - Abnormal; Notable for the following:        Result Value    RBC 4.69 (*)     MCHC 33.6 (*)     Lymphocytes 16.70 (*)     Neutrophils (Absolute) 7.50 (*)     All other components within normal limits   COMP METABOLIC PANEL - Abnormal; Notable for the following:     Alkaline Phosphatase 124 (*)     Total Protein 5.8 (*)     Globulin 1.8 (*)     All other components within normal limits   ESTIMATED GFR - Abnormal; Notable for the following:     GFR If Non  57 (*)     All other components within normal limits         RADIOLOGY/PROCEDURES  I have reviewed the patient's film interpretations myself, and they are read out by the radiologist as:   CT-CHEST,ABDOMEN,PELVIS WITH   Final Result      1.  No acute abnormality within the chest, abdomen, or pelvis      2.  Hepatic steatosis      3.  Bilateral nonobstructive renal calculi in bilateral simple renal cysts      4.  Aortic and branch vessel atherosclerotic plaque      5.  Old right rib fracture. Postoperative and degenerative changes of the spine        .    MEDICAL RECORD  I have reviewed patient's medical record and pertinent results are listed  above.    COURSE & MEDICAL DECISION MAKING  I have reviewed any medical record information, laboratory studies and radiographic results as noted above.  Patient presents after a fall.  My concern is multiple rib fractures, however also wanted to make sure there is no underlying solid organ injury such as liver or spleen.  CT however shows no evidence of any acute traumatic findings.  He may go ahead and give him a dose of Toradol for pain.  Given his lack of bony tenderness and good passive range of motion, I do not there is a fracture.  There is no evidence of this on limited views with the CT.  I have talked about a sling, however the patient does not want one.  As he puts it, this would interfere with his trap shooting.  I recommended Tylenol going forward for pain.  I would like you to follow-up with both orthopedics as well as his primary doctors upcoming week for recheck.        FINAL IMPRESSION  1. Contusion of left chest wall, initial encounter    2. Fall, initial encounter           This dictation was created using voice recognition software.    Electronically signed by: Jose M Mcconnell, 7/13/2019 11:41 AM

## 2019-07-13 NOTE — ED NOTES
"Pt is accompanied by family.  He C/O left side pain, including shoulder, ribcage, and leg after falling down a flight of stairs (5 steps), landing on concrete surface earlier this AM.  Denies head injury.  Chief Complaint   Patient presents with   • T-5000 FALL   • Shoulder Injury   • Rib Injury   • Leg Injury   /64   Pulse 73   Temp 36.4 °C (97.5 °F) (Temporal)   Resp 18   Ht 1.651 m (5' 5\")   Wt 82 kg (180 lb 12.4 oz)   SpO2 96%   BMI 30.08 kg/m²         "

## 2019-07-16 ENCOUNTER — HOSPITAL ENCOUNTER (EMERGENCY)
Facility: MEDICAL CENTER | Age: 79
End: 2019-07-16
Payer: MEDICARE

## 2019-07-16 VITALS
WEIGHT: 188.49 LBS | RESPIRATION RATE: 16 BRPM | TEMPERATURE: 98.3 F | BODY MASS INDEX: 31.37 KG/M2 | SYSTOLIC BLOOD PRESSURE: 116 MMHG | HEART RATE: 70 BPM | DIASTOLIC BLOOD PRESSURE: 71 MMHG | OXYGEN SATURATION: 95 %

## 2019-07-16 PROCEDURE — 302449 STATCHG TRIAGE ONLY (STATISTIC)

## 2019-07-16 NOTE — ED TRIAGE NOTES
"79 y/o male ambulatory to triage with c/o feeling lightheaded since this morning, pt wife states that today pt was talking like he \"had something in his mouth or like he had a buzz\". Pt has clear speech during triage assessment, no unilateral deficits noted, pt believes his symptoms are related to his sinus issues, he states that he has sinus pressure/pain but that this is chronic.   "

## 2019-07-19 ENCOUNTER — HOSPITAL ENCOUNTER (EMERGENCY)
Dept: HOSPITAL 8 - ED | Age: 79
LOS: 1 days | Discharge: HOME | End: 2019-07-20
Payer: MEDICARE

## 2019-07-19 VITALS — BODY MASS INDEX: 31 KG/M2 | WEIGHT: 186.07 LBS | HEIGHT: 65 IN
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
## 2019-07-19 DIAGNOSIS — Z87.891: ICD-10-CM
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
## 2019-07-19 DIAGNOSIS — S80.12XA: Primary | ICD-10-CM

## 2019-07-19 DIAGNOSIS — G89.11: ICD-10-CM
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
## 2019-07-19 DIAGNOSIS — M79.662: ICD-10-CM

## 2019-07-19 LAB
ALBUMIN SERPL-MCNC: 3.7 G/DL (ref 3.4–5)
ALP SERPL-CCNC: 140 U/L (ref 45–117)
ALT SERPL-CCNC: 36 U/L (ref 12–78)
ANION GAP SERPL CALC-SCNC: 8 MMOL/L (ref 5–15)
BASOPHILS # BLD AUTO: 0.08 X10^3/UL (ref 0–0.1)
BASOPHILS NFR BLD AUTO: 1 % (ref 0–1)
BILIRUB SERPL-MCNC: 0.5 MG/DL (ref 0.2–1)
CALCIUM SERPL-MCNC: 8.6 MG/DL (ref 8.5–10.1)
CHLORIDE SERPL-SCNC: 111 MMOL/L (ref 98–107)
CREAT SERPL-MCNC: 1.16 MG/DL (ref 0.7–1.3)
EOSINOPHIL # BLD AUTO: 0.47 X10^3/UL (ref 0–0.4)
EOSINOPHIL NFR BLD AUTO: 9 % (ref 1–7)
ERYTHROCYTE [DISTWIDTH] IN BLOOD BY AUTOMATED COUNT: 14.8 % (ref 9.4–14.8)
INR PPP: 1 (ref 0.93–1.1)
LYMPHOCYTES # BLD AUTO: 1.64 X10^3/UL (ref 1–3.4)
LYMPHOCYTES NFR BLD AUTO: 30 % (ref 22–44)
MCH RBC QN AUTO: 32.7 PG (ref 27.5–34.5)
MCHC RBC AUTO-ENTMCNC: 33.4 G/DL (ref 33.2–36.2)
MCV RBC AUTO: 97.6 FL (ref 81–97)
MD: NO
MONOCYTES # BLD AUTO: 0.48 X10^3/UL (ref 0.2–0.8)
MONOCYTES NFR BLD AUTO: 9 % (ref 2–9)
NEUTROPHILS # BLD AUTO: 2.79 X10^3/UL (ref 1.8–6.8)
NEUTROPHILS NFR BLD AUTO: 51 % (ref 42–75)
PLATELET # BLD AUTO: 206 X10^3/UL (ref 130–400)
PMV BLD AUTO: 8.2 FL (ref 7.4–10.4)
PROT SERPL-MCNC: 6.4 G/DL (ref 6.4–8.2)
PROTHROMBIN TIME: 10.5 SECONDS (ref 9.6–11.5)
RBC # BLD AUTO: 4.36 X10^6/UL (ref 4.38–5.82)

## 2019-07-19 PROCEDURE — 36415 COLL VENOUS BLD VENIPUNCTURE: CPT

## 2019-07-19 PROCEDURE — 85025 COMPLETE CBC W/AUTO DIFF WBC: CPT

## 2019-07-19 PROCEDURE — 80053 COMPREHEN METABOLIC PANEL: CPT

## 2019-07-19 PROCEDURE — 85610 PROTHROMBIN TIME: CPT

## 2019-07-19 PROCEDURE — 93005 ELECTROCARDIOGRAM TRACING: CPT

## 2019-07-19 PROCEDURE — 99284 EMERGENCY DEPT VISIT MOD MDM: CPT

## 2019-07-20 VITALS — SYSTOLIC BLOOD PRESSURE: 129 MMHG | DIASTOLIC BLOOD PRESSURE: 75 MMHG

## 2019-08-01 ENCOUNTER — HOSPITAL ENCOUNTER (EMERGENCY)
Dept: HOSPITAL 8 - ED | Age: 79
Discharge: HOME | End: 2019-08-01
Payer: MEDICARE

## 2019-08-01 VITALS — HEIGHT: 65 IN | WEIGHT: 185.19 LBS | BODY MASS INDEX: 30.85 KG/M2

## 2019-08-01 VITALS — DIASTOLIC BLOOD PRESSURE: 77 MMHG | SYSTOLIC BLOOD PRESSURE: 133 MMHG

## 2019-08-01 DIAGNOSIS — E78.5: ICD-10-CM

## 2019-08-01 DIAGNOSIS — K21.9: ICD-10-CM

## 2019-08-01 DIAGNOSIS — I10: ICD-10-CM

## 2019-08-01 DIAGNOSIS — L03.116: Primary | ICD-10-CM

## 2019-08-01 LAB
ALBUMIN SERPL-MCNC: 3.7 G/DL (ref 3.4–5)
ALP SERPL-CCNC: 159 U/L (ref 45–117)
ALT SERPL-CCNC: 33 U/L (ref 12–78)
ANION GAP SERPL CALC-SCNC: 6 MMOL/L (ref 5–15)
BASOPHILS # BLD AUTO: 0.03 X10^3/UL (ref 0–0.1)
BASOPHILS NFR BLD AUTO: 1 % (ref 0–1)
BILIRUB SERPL-MCNC: 1.9 MG/DL (ref 0.2–1)
CALCIUM SERPL-MCNC: 8.5 MG/DL (ref 8.5–10.1)
CHLORIDE SERPL-SCNC: 114 MMOL/L (ref 98–107)
CREAT SERPL-MCNC: 1.14 MG/DL (ref 0.7–1.3)
EOSINOPHIL # BLD AUTO: 0.36 X10^3/UL (ref 0–0.4)
EOSINOPHIL NFR BLD AUTO: 5 % (ref 1–7)
ERYTHROCYTE [DISTWIDTH] IN BLOOD BY AUTOMATED COUNT: 15.1 % (ref 9.4–14.8)
LYMPHOCYTES # BLD AUTO: 1.52 X10^3/UL (ref 1–3.4)
LYMPHOCYTES NFR BLD AUTO: 23 % (ref 22–44)
MCH RBC QN AUTO: 33 PG (ref 27.5–34.5)
MCHC RBC AUTO-ENTMCNC: 33.8 G/DL (ref 33.2–36.2)
MCV RBC AUTO: 97.7 FL (ref 81–97)
MD: NO
MONOCYTES # BLD AUTO: 0.53 X10^3/UL (ref 0.2–0.8)
MONOCYTES NFR BLD AUTO: 8 % (ref 2–9)
NEUTROPHILS # BLD AUTO: 4.25 X10^3/UL (ref 1.8–6.8)
NEUTROPHILS NFR BLD AUTO: 64 % (ref 42–75)
PLATELET # BLD AUTO: 213 X10^3/UL (ref 130–400)
PMV BLD AUTO: 7.9 FL (ref 7.4–10.4)
PROT SERPL-MCNC: 6.8 G/DL (ref 6.4–8.2)
RBC # BLD AUTO: 4.42 X10^6/UL (ref 4.38–5.82)

## 2019-08-01 PROCEDURE — 85025 COMPLETE CBC W/AUTO DIFF WBC: CPT

## 2019-08-01 PROCEDURE — 99283 EMERGENCY DEPT VISIT LOW MDM: CPT

## 2019-08-01 PROCEDURE — 36415 COLL VENOUS BLD VENIPUNCTURE: CPT

## 2019-08-01 PROCEDURE — 80053 COMPREHEN METABOLIC PANEL: CPT

## 2019-08-27 ENCOUNTER — HOSPITAL ENCOUNTER (EMERGENCY)
Facility: MEDICAL CENTER | Age: 79
End: 2019-08-27
Attending: EMERGENCY MEDICINE
Payer: MEDICARE

## 2019-08-27 ENCOUNTER — APPOINTMENT (OUTPATIENT)
Dept: RADIOLOGY | Facility: MEDICAL CENTER | Age: 79
End: 2019-08-27
Attending: EMERGENCY MEDICINE
Payer: MEDICARE

## 2019-08-27 VITALS
HEIGHT: 65 IN | SYSTOLIC BLOOD PRESSURE: 125 MMHG | DIASTOLIC BLOOD PRESSURE: 63 MMHG | OXYGEN SATURATION: 97 % | HEART RATE: 71 BPM | BODY MASS INDEX: 30.19 KG/M2 | RESPIRATION RATE: 22 BRPM | TEMPERATURE: 97.6 F | WEIGHT: 181.22 LBS

## 2019-08-27 DIAGNOSIS — R42 DIZZINESS: ICD-10-CM

## 2019-08-27 DIAGNOSIS — R11.0 NAUSEA: ICD-10-CM

## 2019-08-27 DIAGNOSIS — R00.2 PALPITATIONS: ICD-10-CM

## 2019-08-27 DIAGNOSIS — R55 NEAR SYNCOPE: ICD-10-CM

## 2019-08-27 LAB
ALBUMIN SERPL BCP-MCNC: 4 G/DL (ref 3.2–4.9)
ALBUMIN/GLOB SERPL: 1.8 G/DL
ALP SERPL-CCNC: 146 U/L (ref 30–99)
ALT SERPL-CCNC: 20 U/L (ref 2–50)
ANION GAP SERPL CALC-SCNC: 10 MMOL/L (ref 0–11.9)
APTT PPP: 29.9 SEC (ref 24.7–36)
AST SERPL-CCNC: 16 U/L (ref 12–45)
BASOPHILS # BLD AUTO: 0.5 % (ref 0–1.8)
BASOPHILS # BLD: 0.04 K/UL (ref 0–0.12)
BILIRUB SERPL-MCNC: 0.6 MG/DL (ref 0.1–1.5)
BUN SERPL-MCNC: 15 MG/DL (ref 8–22)
CALCIUM SERPL-MCNC: 8.9 MG/DL (ref 8.4–10.2)
CHLORIDE SERPL-SCNC: 104 MMOL/L (ref 96–112)
CO2 SERPL-SCNC: 26 MMOL/L (ref 20–33)
CREAT SERPL-MCNC: 0.91 MG/DL (ref 0.5–1.4)
EKG IMPRESSION: NORMAL
EOSINOPHIL # BLD AUTO: 0.18 K/UL (ref 0–0.51)
EOSINOPHIL NFR BLD: 2.1 % (ref 0–6.9)
ERYTHROCYTE [DISTWIDTH] IN BLOOD BY AUTOMATED COUNT: 49.1 FL (ref 35.9–50)
GLOBULIN SER CALC-MCNC: 2.2 G/DL (ref 1.9–3.5)
GLUCOSE SERPL-MCNC: 147 MG/DL (ref 65–99)
HCT VFR BLD AUTO: 45.3 % (ref 42–52)
HGB BLD-MCNC: 14.9 G/DL (ref 14–18)
IMM GRANULOCYTES # BLD AUTO: 0.04 K/UL (ref 0–0.11)
IMM GRANULOCYTES NFR BLD AUTO: 0.5 % (ref 0–0.9)
INR PPP: 0.98 (ref 0.87–1.13)
LYMPHOCYTES # BLD AUTO: 1.17 K/UL (ref 1–4.8)
LYMPHOCYTES NFR BLD: 13.4 % (ref 22–41)
MCH RBC QN AUTO: 32 PG (ref 27–33)
MCHC RBC AUTO-ENTMCNC: 32.9 G/DL (ref 33.7–35.3)
MCV RBC AUTO: 97.4 FL (ref 81.4–97.8)
MONOCYTES # BLD AUTO: 0.61 K/UL (ref 0–0.85)
MONOCYTES NFR BLD AUTO: 7 % (ref 0–13.4)
NEUTROPHILS # BLD AUTO: 6.71 K/UL (ref 1.82–7.42)
NEUTROPHILS NFR BLD: 76.5 % (ref 44–72)
NRBC # BLD AUTO: 0 K/UL
NRBC BLD-RTO: 0 /100 WBC
PLATELET # BLD AUTO: 181 K/UL (ref 164–446)
PMV BLD AUTO: 9.8 FL (ref 9–12.9)
POTASSIUM SERPL-SCNC: 3.8 MMOL/L (ref 3.6–5.5)
PROT SERPL-MCNC: 6.2 G/DL (ref 6–8.2)
PROTHROMBIN TIME: 13.1 SEC (ref 12–14.6)
RBC # BLD AUTO: 4.65 M/UL (ref 4.7–6.1)
SODIUM SERPL-SCNC: 140 MMOL/L (ref 135–145)
TROPONIN T SERPL-MCNC: 11 NG/L (ref 6–19)
WBC # BLD AUTO: 8.8 K/UL (ref 4.8–10.8)

## 2019-08-27 PROCEDURE — 80053 COMPREHEN METABOLIC PANEL: CPT

## 2019-08-27 PROCEDURE — 93005 ELECTROCARDIOGRAM TRACING: CPT

## 2019-08-27 PROCEDURE — 71045 X-RAY EXAM CHEST 1 VIEW: CPT

## 2019-08-27 PROCEDURE — 700111 HCHG RX REV CODE 636 W/ 250 OVERRIDE (IP): Performed by: EMERGENCY MEDICINE

## 2019-08-27 PROCEDURE — 84484 ASSAY OF TROPONIN QUANT: CPT

## 2019-08-27 PROCEDURE — 85025 COMPLETE CBC W/AUTO DIFF WBC: CPT

## 2019-08-27 PROCEDURE — 85610 PROTHROMBIN TIME: CPT

## 2019-08-27 PROCEDURE — 96374 THER/PROPH/DIAG INJ IV PUSH: CPT

## 2019-08-27 PROCEDURE — 99284 EMERGENCY DEPT VISIT MOD MDM: CPT

## 2019-08-27 PROCEDURE — 85730 THROMBOPLASTIN TIME PARTIAL: CPT

## 2019-08-27 PROCEDURE — 93005 ELECTROCARDIOGRAM TRACING: CPT | Performed by: EMERGENCY MEDICINE

## 2019-08-27 PROCEDURE — 36415 COLL VENOUS BLD VENIPUNCTURE: CPT

## 2019-08-27 PROCEDURE — 700105 HCHG RX REV CODE 258: Performed by: EMERGENCY MEDICINE

## 2019-08-27 RX ORDER — SODIUM CHLORIDE 9 MG/ML
1000 INJECTION, SOLUTION INTRAVENOUS ONCE
Status: COMPLETED | OUTPATIENT
Start: 2019-08-27 | End: 2019-08-27

## 2019-08-27 RX ORDER — ONDANSETRON 2 MG/ML
4 INJECTION INTRAMUSCULAR; INTRAVENOUS ONCE
Status: COMPLETED | OUTPATIENT
Start: 2019-08-27 | End: 2019-08-27

## 2019-08-27 RX ORDER — IBUPROFEN 800 MG/1
800 TABLET ORAL EVERY 8 HOURS PRN
COMMUNITY

## 2019-08-27 RX ORDER — FOLIC ACID 0.8 MG
500 TABLET ORAL EVERY EVENING
COMMUNITY

## 2019-08-27 RX ADMIN — ONDANSETRON 4 MG: 2 INJECTION INTRAMUSCULAR; INTRAVENOUS at 11:44

## 2019-08-27 RX ADMIN — SODIUM CHLORIDE 1000 ML: 9 INJECTION, SOLUTION INTRAVENOUS at 11:44

## 2019-08-27 NOTE — ED NOTES
DC instructions reviewed. Verbalizes understanding. NAD. Ambulated to exit with wife with a steady gait.

## 2019-08-27 NOTE — ED PROVIDER NOTES
"ED Provider Note    CHIEF COMPLAINT  Chief Complaint   Patient presents with   • Irregular Heart Beat     \" heart skipping beats \" Has PPM  \" there's something wrong with it \"  Denies CP \" I'm breathing hard \"   • Dizziness   • Nausea     No emesis       HPI  Pro Kearney is a 79 y.o. Male with a history of sick sinus syndrome, status post pacemaker placement, hypertension, hyperlipidemia, arthritis who presents with complaints of palpitations and rapid heart rate.  The patient says he woke up this morning and felt like he was having palpitations, became lightheaded, dizzy, nauseous and had an episode of vomiting.  He would back to bed and then awoke several hours later and was feeling better.  He says the symptoms very similar to an episode about 6 months ago when he had some shortness of breath, nausea, and dizziness.  He was seen in the ER, and was separately discharged home.  He says when he follow-up with his cardiologist, they found that he had had a 12-minute run of atrial fibrillation.  The patient is concerned that this has happened again.  At this time he denies any chest pain, shortness breath, lightheadedness or dizziness.  He denies any feelings of a rapid heart rate or palpitations.  He has not been ill with any fever, chills, sore throat, cough, congestion, any difficulty breathing.  He has had no nausea or vomiting tolerated this more he has had no diarrhea.    REVIEW OF SYSTEMS  See HPI for further details. All other systems are negative.     PAST MEDICAL HISTORY  Past Medical History:   Diagnosis Date   • Arthritis     Generalized   • Back pain June 2014    Status post laminectomy by Dr. Balderrama, with relieft of symptoms.   • Calcification of coronary artery     found by CT scan.    • Cardiac pacemaker in situ     Generator replacement with Medtronic Adapta ADDR01 by Dr. Maldonado, 11/2012. Original device implanted in 1985. RV lead revision in 1995.   • Clostridium difficile infection     " complicating clindamycin therapy for otitis   • Hiatus hernia syndrome    • HTN (hypertension)     • Hyperlipidemia    • Lower urinary tract symptoms (LUTS)     BPH   • Nephrolithiasis    • Pneumonia    • Sick sinus syndrome (HCC)    • Unspecified disorder of middle ear and mastoid     Middle ear/mastoid dis, recurrent otitis       FAMILY HISTORY  Family History   Problem Relation Age of Onset   • Other Mother         leukemia   • Other Father         leukemia   • Cancer Sister        SOCIAL HISTORY  Social History     Socioeconomic History   • Marital status:      Spouse name: Not on file   • Number of children: Not on file   • Years of education: Not on file   • Highest education level: Not on file   Occupational History   • Not on file   Social Needs   • Financial resource strain: Not on file   • Food insecurity:     Worry: Not on file     Inability: Not on file   • Transportation needs:     Medical: Not on file     Non-medical: Not on file   Tobacco Use   • Smoking status: Former Smoker     Packs/day: 1.00     Years: 25.00     Pack years: 25.00     Types: Cigarettes     Last attempt to quit: 1969     Years since quittin.6   • Smokeless tobacco: Former User     Types: Snuff, Chew     Quit date: 2007   • Tobacco comment: 18 months ago - chewing tobacco   Substance and Sexual Activity   • Alcohol use: No     Alcohol/week: 0.0 oz   • Drug use: No   • Sexual activity: Yes     Partners: Female   Lifestyle   • Physical activity:     Days per week: Not on file     Minutes per session: Not on file   • Stress: Not on file   Relationships   • Social connections:     Talks on phone: Not on file     Gets together: Not on file     Attends Jew service: Not on file     Active member of club or organization: Not on file     Attends meetings of clubs or organizations: Not on file     Relationship status: Not on file   • Intimate partner violence:     Fear of current or ex partner: Not on file      "Emotionally abused: Not on file     Physically abused: Not on file     Forced sexual activity: Not on file   Other Topics Concern   • Not on file   Social History Narrative    Retired [employer] V[employer].        SURGICAL HISTORY  Past Surgical History:   Procedure Laterality Date   • LUMBAR LAMINECTOMY DISKECTOMY  6/25/2014    Performed by Ankit Balderrama M.D. at SURGERY [facility] ORS   • RECOVERY  11/28/2012    Performed by Cath-Recovery Surgery at SURGERY SAME DAY [facility] ORS   • PACEMAKER INSERTION  November 2012    Generator replacement with Medtronic Adapta ADDR01 by Dr. Maldonado. Original device implanted in 1985. RV lead revision in 1995.   • APPENDECTOMY     • CATARACT EXTRACTION WITH IOL Bilateral         • CHOLECYSTECTOMY         CURRENT MEDICATIONS  Home Medications     Reviewed by Patrice Coppola (Pharmacy Tech) on 08/27/19 at 1144  Med List Status: Complete   Medication Last Dose Status   aspirin EC (ECOTRIN) 81 MG Tablet Delayed Response 8/26/2019 Active   atorvastatin (LIPITOR) 40 MG Tab 8/26/2019 Active   ibuprofen (MOTRIN) 800 MG Tab 8/26/2019 Active   Magnesium 500 MG Cap > 3 days Active   metoprolol SR (TOPROL XL) 50 MG TABLET SR 24 HR 8/26/2019 Active   omeprazole (PRILOSEC) 20 MG delayed-release capsule > 10 days Active   RESTASIS 0.05 % ophthalmic emulsion > 2 days Active   tamsulosin (FLOMAX) 0.4 MG capsule 8/26/2019 Active                ALLERGIES  Allergies   Allergen Reactions   • Rocephin [Ceftriaxone Sodium] Shortness of Breath, Rash and Itching     Anaphylactic type reaction, rapid onset   • Codeine Vomiting   • Clindamycin      C difficile   • Morphine Itching and Nausea   • Shellfish-Derived Products Itching     Shellfish, minor allergy; has had contrast without difficulty       PHYSICAL EXAM  VITAL SIGNS: /69   Pulse 70   Temp 36.4 °C (97.6 °F) (Temporal)   Resp (!) 22   Ht 1.651 m (5' 5\")   Wt 82.2 kg (181 lb 3.5 oz)   SpO2 97%   BMI 30.16 kg/m²   Constitutional: Awake, " alert, in no acute distress, Non-toxic appearance.   HENT: Atraumatic. Bilateral external ears normal, mucous membranes moist, throat nonerythematous without exudates, nose is normal.  Eyes: PERRL, EOMI, conjunctiva moist, noninjected.  Neck: Nontender, Normal range of motion, No nuchal rigidity, No stridor.   Lymphatic: No lymphadenopathy noted.   Cardiovascular: Regular rate and rhythm, no murmurs, rubs, gallops.  Thorax & Lungs:  Good breath sounds bilaterally, no wheezes, rales, or retractions.  No chest tenderness.  Abdomen: Bowel sounds normal, Soft, nontender, nondistended, no rebound, guarding, masses.  Back: No CVA or spinal tenderness.  Extremities: Intact distal pulses, No edema, No tenderness.   Skin: Warm, Dry, No rashes.   Musculoskeletal: No joint swelling or tenderness.  Neurologic: Alert & oriented x 3, sensory and motor function normal. No focal deficits.   Psychiatric: Affect normal, Judgment normal, Mood normal.     EKG  Twelve-lead EKG shows a atrial paced rhythm, rate of 74, normal intervals, left axis, left anterior fascicular block, no acute ST wave elevations or ST depressions, no pathologic Q waves, no evidence of acute injury or ischemic pattern on my reading, in comparison to previous EKG from May, 2019, there are no acute changes.        RADIOLOGY/PROCEDURES  DX-CHEST-PORTABLE (1 VIEW)   Final Result      Minimal linear bibasilar atelectasis.            COURSE & MEDICAL DECISION MAKING  Pertinent Labs & Imaging studies reviewed. (See chart for details)  The patient presents with the above complaints.  This time he appears to be in a sinus rhythm on monitor.  He is currently asymptomatic, but does complain of some very slight nausea.  Clinically he has dry mucous membranes given his episode of vomiting, IV was placed, he was given a bolus of normal saline.  He was given Zofran 4 mg IV.  EKG shows a paced rhythm.  Chest x-ray shows minimal bibasilar atelectasis.  CBC shows a white count of  8800, hemoglobin 14.9, 77% polys, chemistry shows normal electro lites, glucose 147, normal renal function and liver function tests, troponin 11.  INR is normal at 0.98.  The patient has a Medtronic pacemaker which was interrogated.  I talked with the pacemaker representative, who noted there was no arrhythmia or abnormalities.  He did report that the patient's previous episode was an episode of atrial flutter.  On reexamination, patient was feeling much improved.  He denied any further nausea, denies any lightheadedness, dizziness, shortness of breath.  He has had no chest pain.  The patient will be discharged with instructions to follow-up with his PCP at the [facility].  He is given a copy of his lab work.  He is to return to the ER for any chest pain, shortness of breath, dizziness, recurrent vomiting, or any other problems per    FINAL IMPRESSION  1.  Palpitations  2.  Nausea, vomiting  3.  Dizziness         Electronically signed by: Judah Sandhu, 8/27/2019 2:10 PM

## 2019-08-27 NOTE — ED NOTES
Med rec updated and complete  Allergies reviewed  Interviewed pt with wife at bedside with permission from pt.  Pt reports no antibiotics in the last 2 weeks

## 2019-08-27 NOTE — ED NOTES
Pt to ED for evaluation of Nausea/vomiting with dizziness. Pt states it feels like his heart is skipping beats.

## 2019-10-22 ENCOUNTER — NON-PROVIDER VISIT (OUTPATIENT)
Dept: CARDIOLOGY | Facility: MEDICAL CENTER | Age: 79
End: 2019-10-22
Payer: MEDICARE

## 2019-10-22 DIAGNOSIS — Z95.0 CARDIAC PACEMAKER IN SITU: ICD-10-CM

## 2019-10-22 PROCEDURE — 93280 PM DEVICE PROGR EVAL DUAL: CPT | Performed by: INTERNAL MEDICINE

## 2020-02-14 ENCOUNTER — OFFICE VISIT (OUTPATIENT)
Dept: CARDIOLOGY | Facility: MEDICAL CENTER | Age: 80
End: 2020-02-14
Payer: MEDICARE

## 2020-02-14 ENCOUNTER — NON-PROVIDER VISIT (OUTPATIENT)
Dept: CARDIOLOGY | Facility: MEDICAL CENTER | Age: 80
End: 2020-02-14
Payer: MEDICARE

## 2020-02-14 VITALS
SYSTOLIC BLOOD PRESSURE: 128 MMHG | OXYGEN SATURATION: 95 % | WEIGHT: 189 LBS | DIASTOLIC BLOOD PRESSURE: 72 MMHG | HEIGHT: 65 IN | HEART RATE: 72 BPM | BODY MASS INDEX: 31.49 KG/M2

## 2020-02-14 DIAGNOSIS — I49.3 PREMATURE VENTRICULAR CONTRACTIONS: ICD-10-CM

## 2020-02-14 DIAGNOSIS — I77.819 DILATION OF DESCENDING AORTA (HCC): ICD-10-CM

## 2020-02-14 DIAGNOSIS — I49.5 SICK SINUS SYNDROME (HCC): ICD-10-CM

## 2020-02-14 DIAGNOSIS — Z95.0 CARDIAC PACEMAKER IN SITU: ICD-10-CM

## 2020-02-14 DIAGNOSIS — I10 ESSENTIAL HYPERTENSION, BENIGN: ICD-10-CM

## 2020-02-14 DIAGNOSIS — I25.10 CORONARY ARTERY CALCIFICATION SEEN ON CAT SCAN: ICD-10-CM

## 2020-02-14 DIAGNOSIS — E78.2 MIXED HYPERLIPIDEMIA: ICD-10-CM

## 2020-02-14 DIAGNOSIS — I70.0 AORTIC CALCIFICATION (HCC): ICD-10-CM

## 2020-02-14 PROCEDURE — 99214 OFFICE O/P EST MOD 30 MIN: CPT | Performed by: INTERNAL MEDICINE

## 2020-02-14 PROCEDURE — 93280 PM DEVICE PROGR EVAL DUAL: CPT | Performed by: INTERNAL MEDICINE

## 2020-02-14 RX ORDER — NITROGLYCERIN 0.4 MG/1
0.4 TABLET SUBLINGUAL PRN
Qty: 25 TAB | Refills: 1 | Status: SHIPPED | DISCHARGE
Start: 2020-02-14

## 2020-02-14 NOTE — PROGRESS NOTES
Cardiology Follow-up Consultation Note    Date of note:    2/14/2020  Primary Care Provider: Pcp Pt States None  Referring Provider: Lori Mays A.P.N./Wolfgang    Patient Name: Pro Kearney   YOB: 1940  MRN:              [id number]    Chief Complaint: chest pain    History of Present Illness: Pro Kearney is a 79 y.o. male whose current medical problems include SSS s/p pacemaker, hypertension, dyslipidemia, and coronary artery calcification by CT scan who is here for follow-up.    At our visit, 12/11/2018:  Has not needed nitroglycerin.     In terms of pacemaker, no issues. No palpitations.     In terms of dyslipidemia[facility]     He found out this past Friday that he has a sister.      Interim Events:  Pacemaker has 6 months of battery left.     In terms of hypertension, BP well controlled.     In terms of coronary artery calcification, has not needed ntg.     Weight is up 10 pounds from the holidays. Does have soda and candy frequently. Does walk 2 miles on occasion.      Review of Systems   Constitution: Negative for chills, fever and night sweats.   HENT: Negative for nosebleeds.    Eyes: Negative for vision loss in left eye and vision loss in right eye.   Respiratory: Negative for hemoptysis.    Gastrointestinal: Negative for hematemesis, hematochezia and melena.   Genitourinary: Negative for hematuria.   Neurological: Negative for focal weakness, numbness and paresthesias.     + shoulder pain from arthritis.     Past Medical History:   Diagnosis Date   • Arthritis     Generalized   • Back pain June 2014    Status post laminectomy by Dr. Balderrama, with relieft of symptoms.   • Calcification of coronary artery     found by CT scan.    • Cardiac pacemaker in situ     Generator replacement with Medtronic Adapta ADDR01 by Dr. Maldonado, 11/2012. Original device implanted in 1985. RV lead revision in 1995.   • Clostridium difficile infection     complicating clindamycin therapy for otitis   • Hiatus hernia syndrome    • HTN (hypertension)     • Hyperlipidemia    • Lower urinary tract symptoms (LUTS)     BPH   • Nephrolithiasis    • Pneumonia 2000   • Sick sinus syndrome (HCC)    • Unspecified disorder of middle ear and mastoid     Middle ear/mastoid dis, recurrent otitis         Past Surgical History:   Procedure Laterality Date   • LUMBAR LAMINECTOMY DISKECTOMY  6/25/2014    Performed by Ankit Balderrama M.D. at SURGERY [facility] ORS   • RECOVERY  11/28/2012    Performed by Cath-Recovery Surgery at SURGERY SAME DAY [facility] ORS   • PACEMAKER INSERTION  November 2012    Generator replacement with Medtronic Adapta ADDR01 by Dr. Maldonado. Original device implanted in 1985. RV lead revision in 1995.   • APPENDECTOMY     • CATARACT EXTRACTION WITH IOL Bilateral         • CHOLECYSTECTOMY           Current Outpatient Medications   Medication Sig Dispense Refill   • aspirin EC (ECOTRIN) 81 MG Tablet Delayed Response Take 81 mg by mouth every evening.     • atorvastatin (LIPITOR) 40 MG Tab Take 40 mg by mouth every evening.     • metoprolol SR (TOPROL XL) 50 MG TABLET SR 24 HR Take 1 Tab by mouth every day. 90 Tab 3   • RESTASIS 0.05 % ophthalmic emulsion Place 1 Drop in both eyes 1 time daily as needed (For dry eye).     • tamsulosin (FLOMAX) 0.4 MG capsule Take 0.4 mg by mouth every bedtime.     • ibuprofen (MOTRIN) 800 MG Tab Take 800 mg by mouth every 8 hours as needed for Mild Pain.     • Magnesium 500 MG Cap Take 500 mg by mouth every evening.     • omeprazole (PRILOSEC) 20 MG delayed-release capsule Take 20 mg by mouth 1 time daily as needed. Indications: Heartburn       No current facility-administered medications for this visit.          Allergies   Allergen Reactions   • Rocephin [Ceftriaxone Sodium] Shortness of Breath, Rash and Itching     Anaphylactic type reaction, rapid onset   • Codeine Vomiting   • Clindamycin      C difficile   • Morphine Itching and  Nausea   • Shellfish-Derived Products Itching     Shellfish, minor allergy; has had contrast without difficulty         Family History   Problem Relation Age of Onset   • Other Mother         leukemia   • Other Father         leukemia   • Cancer Sister          Social History     Socioeconomic History   • Marital status:      Spouse name: Not on file   • Number of children: Not on file   • Years of education: Not on file   • Highest education level: Not on file   Occupational History   • Not on file   Social Needs   • Financial resource strain: Not on file   • Food insecurity     Worry: Not on file     Inability: Not on file   • Transportation needs     Medical: Not on file     Non-medical: Not on file   Tobacco Use   • Smoking status: Former Smoker     Packs/day: 1.00     Years: 25.00     Pack years: 25.00     Types: Cigarettes     Last attempt to quit: 1969     Years since quittin.1   • Smokeless tobacco: Former User     Types: Snuff, Chew     Quit date: 2007   • Tobacco comment: 18 months ago - chewing tobacco   Substance and Sexual Activity   • Alcohol use: No     Alcohol/week: 0.0 oz   • Drug use: No   • Sexual activity: Yes     Partners: Female   Lifestyle   • Physical activity     Days per week: Not on file     Minutes per session: Not on file   • Stress: Not on file   Relationships   • Social connections     Talks on phone: Not on file     Gets together: Not on file     Attends Jehovah's witness service: Not on file     Active member of club or organization: Not on file     Attends meetings of clubs or organizations: Not on file     Relationship status: Not on file   • Intimate partner violence     Fear of current or ex partner: Not on file     Emotionally abused: Not on file     Physically abused: Not on file     Forced sexual activity: Not on file   Other Topics Concern   • Not on file   Social History Narrative    Retired [employer] V[employer].          Physical Exam:  Ambulatory Vitals  /72 (BP  "Location: Left arm, Patient Position: Sitting, BP Cuff Size: Adult)   Pulse 72   Ht 1.651 m (5' 5\")   Wt 85.7 kg (189 lb)   SpO2 95%    Oxygen Therapy:  Pulse Oximetry: 95 %  BP Readings from Last 4 Encounters:   02/14/20 128/72   08/27/19 125/63   07/16/19 116/71   07/13/19 111/64       Weight/BMI: Body mass index is 31.45 kg/m².  Wt Readings from Last 4 Encounters:   02/14/20 85.7 kg (189 lb)   08/27/19 82.2 kg (181 lb 3.5 oz)   07/16/19 85.5 kg (188 lb 7.9 oz)   07/13/19 82 kg (180 lb 12.4 oz)       General: No apparent distress  Eyes: nl conjunctiva  ENT: OP clear, normal external appearance of nose and ears  Neck: JVP <8 cm H2O, no carotid bruits  Lungs: normal respiratory effort, CTAB  Heart: RRR, no murmurs, no rubs or gallops, trace edema bilateral lower extremities. No LV/RV heave on cardiac palpatation. 2+ bilateral radial pulses.    Abdomen: soft, non tender, non distended, no masses, normal bowel sounds.  No HSM.  Extremities/MSK: no clubbing, no cyanosis  Neurological: No focal sensory deficits  Psychiatric: Appropriate affect, A/O x 3, intact judgement and insight  Skin: Warm extremities    Exam repeated in full and unchanged except for as noted above.          Lab Data Review:  Lab Results   Component Value Date/Time    CHOLSTRLTOT 159 02/19/2015 09:01 AM    LDL 73 02/19/2015 09:01 AM    HDL 34 (A) 02/19/2015 09:01 AM    TRIGLYCERIDE 262 (H) 02/19/2015 09:01 AM       Lab Results   Component Value Date/Time    SODIUM 140 08/27/2019 12:54 PM    POTASSIUM 3.8 08/27/2019 12:54 PM    CHLORIDE 104 08/27/2019 12:54 PM    CO2 26 08/27/2019 12:54 PM    GLUCOSE 147 (H) 08/27/2019 12:54 PM    BUN 15 08/27/2019 12:54 PM    CREATININE 0.91 08/27/2019 12:54 PM    CREATININE 1.1 10/02/2008 01:05 AM     Lab Results   Component Value Date/Time    ALKPHOSPHAT 146 (H) 08/27/2019 12:54 PM    ASTSGOT 16 08/27/2019 12:54 PM    ALTSGPT 20 08/27/2019 12:54 PM    TBILIRUBIN 0.6 08/27/2019 12:54 PM      Lab Results   "   Component Value Date/Time    WBC 8.8 2019 11:20 AM     No components found for: HBGA1C  No components found for: TROPONIN  No components found for: BNP      Cardiac Imaging and Procedures Review:    EKG dated 2019: personally reviewed, showed a paced, LAD.       Nuclear Perfusion Imaging (9/3/2018):    Myocardial Perfusion   Report   NUCLEAR IMAGING INTERPRETATION   Normal left ventricular size, ejection fraction, and wall motion.   There is an apparent small area of ischemia (reversible defect) in the apical      region.    Non-reversible defect noted.      D/w Dr. Herbert   Nondiagnostic ECG portion of a Regadenoson nuclear stress test.   Atrial paced rhythm.   ECG INTERPRETATION   Nondiagnostic ECG portion of a Regadenoson nuclear stress test.      Radiology test Review:  CXR: 2019:    There is no evidence of focal consolidation or evidence of pulmonary edema.  There is no pleural effusion.  The heart is normal in size.  There is a right sided AICD present. There is linear bibasilar atelectasis.    Abdominal US 10/27/2017:  Vascular Laboratory   CONCLUSIONS   No evidence of abdominal aortic or bilateral iliac aneurysm.       Medical Decision Makin. Cardiac pacemaker in situ  F/u with EP    2. Mixed hyperlipidemia  Continue statin, [facility]     3. Sick sinus syndrome (HCC)  F/u with EP, no symptoms    4. Essential hypertension, benign  Well controlled    5. Dilation of descending aorta (HCC)  No viewed on any of the previous CT scans or ultrasounds.  No need for further monitoring.     6. Coronary artery calcification seen on CAT scan  Continue aspirin/statin. Did have mild ischemia on previous stress test, treating medically.   -CTM, ED precautions discussed.     7. BMI 30.0-30.9,adult  discused dietary changes to weight loss.    8. Leg swelling - resolved with decreasing salt.     9. Palpitations - occurred when he was dehydrated.  -continue metoprolol     10. Bilateral arm  pain, positional, likely back  -followed by Dr. Balderrama previously. [facility].       Return in about 6 months (around 8/14/2020). with HUGO Campbell MD, [facility]  [address] [address]  Phone: [phone]  Fax: [phone]

## 2020-05-19 ENCOUNTER — NON-PROVIDER VISIT (OUTPATIENT)
Dept: CARDIOLOGY | Facility: MEDICAL CENTER | Age: 80
End: 2020-05-19
Payer: MEDICARE

## 2020-05-19 DIAGNOSIS — Z95.0 CARDIAC PACEMAKER IN SITU: ICD-10-CM

## 2020-05-19 DIAGNOSIS — I49.5 SICK SINUS SYNDROME (HCC): ICD-10-CM

## 2020-05-19 PROCEDURE — 93280 PM DEVICE PROGR EVAL DUAL: CPT | Performed by: INTERNAL MEDICINE

## 2020-08-04 ENCOUNTER — NON-PROVIDER VISIT (OUTPATIENT)
Dept: CARDIOLOGY | Facility: MEDICAL CENTER | Age: 80
End: 2020-08-04
Payer: MEDICARE

## 2020-08-04 DIAGNOSIS — I49.5 SICK SINUS SYNDROME (HCC): ICD-10-CM

## 2020-08-04 DIAGNOSIS — Z95.0 CARDIAC PACEMAKER IN SITU: ICD-10-CM

## 2020-08-04 PROCEDURE — 93280 PM DEVICE PROGR EVAL DUAL: CPT | Performed by: INTERNAL MEDICINE

## 2020-08-19 NOTE — PROGRESS NOTES
Chief Complaint   Patient presents with   • Chest Pain     F/V DX:CP       Subjective:   Skip Temo Kearney is a 80 y.o. male who presents today for follow up.     Patient of Dr. Campbell. Current medical problems include SSS s/p PPM, HTN, HLD, coronary calcifications on CT.     Continues to walk his dog 1-2 miles daily [place] of [facility]. Additionally he does a lot of rifle shooting, competes in competitions locally still. He denies any angina with exertion. He has not had to use any nitro tabs although carries it with him.     Due for pacer gen change soon, hopes to keep it on the right side so he can shoot. No dizziness, lightlessness with standing. He does not check his blood pressures at home.     Admits to a high salt diet and likes to have ribeye steaks (he plans to eat one tonight). [facility] and [facility].     Past Medical History:   Diagnosis Date   • Arthritis     Generalized   • Back pain June 2014    Status post laminectomy by Dr. Balderrama, with relieft of symptoms.   • Calcification of coronary artery     found by CT scan.    • Cardiac pacemaker in situ     Generator replacement with Medtronic Adapta ADDR01 by Dr. Maldonado, 11/2012. Original device implanted in 1985. RV lead revision in 1995.   • Clostridium difficile infection     complicating clindamycin therapy for otitis   • Hiatus hernia syndrome    • HTN (hypertension)     • Hyperlipidemia    • Lower urinary tract symptoms (LUTS)     BPH   • Nephrolithiasis    • Pneumonia 2000   • Sick sinus syndrome (HCC)    • Unspecified disorder of middle ear and mastoid     Middle ear/mastoid dis, recurrent otitis     Past Surgical History:   Procedure Laterality Date   • LUMBAR LAMINECTOMY DISKECTOMY  6/25/2014    Performed by Ankit Balderrama M.D. at SURGERY [facility]   • RECOVERY  11/28/2012    Performed by Cath-Recovery Surgery at SURGERY SAME DAY [facility]   • PACEMAKER INSERTION  November 2012     Generator replacement with Medtronic Adapta ADDR01 by Dr. Maldonado. Original device implanted in . RV lead revision in .   • APPENDECTOMY     • CATARACT EXTRACTION WITH IOL Bilateral         • CHOLECYSTECTOMY       Family History   Problem Relation Age of Onset   • Other Mother         leukemia   • Other Father         leukemia   • Cancer Sister      Social History     Socioeconomic History   • Marital status:      Spouse name: Not on file   • Number of children: Not on file   • Years of education: Not on file   • Highest education level: Not on file   Occupational History   • Not on file   Social Needs   • Financial resource strain: Not on file   • Food insecurity     Worry: Not on file     Inability: Not on file   • Transportation needs     Medical: Not on file     Non-medical: Not on file   Tobacco Use   • Smoking status: Former Smoker     Packs/day: 1.00     Years: 25.00     Pack years: 25.00     Types: Cigarettes     Quit date: 1969     Years since quittin.6   • Smokeless tobacco: Former User     Types: Snuff, Chew     Quit date: 2007   • Tobacco comment: 18 months ago - chewing tobacco   Substance and Sexual Activity   • Alcohol use: No     Alcohol/week: 0.0 oz   • Drug use: No   • Sexual activity: Yes     Partners: Female   Lifestyle   • Physical activity     Days per week: Not on file     Minutes per session: Not on file   • Stress: Not on file   Relationships   • Social connections     Talks on phone: Not on file     Gets together: Not on file     Attends Zoroastrian service: Not on file     Active member of club or organization: Not on file     Attends meetings of clubs or organizations: Not on file     Relationship status: Not on file   • Intimate partner violence     Fear of current or ex partner: Not on file     Emotionally abused: Not on file     Physically abused: Not on file     Forced sexual activity: Not on file   Other Topics Concern   • Not on file   Social History  Narrative    Retired [employer] V[employer].      Allergies   Allergen Reactions   • Rocephin [Ceftriaxone Sodium] Shortness of Breath, Rash and Itching     Anaphylactic type reaction, rapid onset   • Codeine Vomiting   • Clindamycin      C difficile   • Morphine Itching and Nausea   • Shellfish-Derived Products Itching     Shellfish, minor allergy; has had contrast without difficulty     Outpatient Encounter Medications as of 8/20/2020   Medication Sig Dispense Refill   • vitamin D (CHOLECALCIFEROL) 1000 Unit (25 mcg) Tab Take 1,000 Units by mouth every day.     • vitamin e (VITAMIN E) 400 UNIT Cap Take 400 Units by mouth every day.     • Ascorbic Acid (VITAMIN C) 1000 MG Tab Take  by mouth.     • nitroglycerin (NITROSTAT) 0.4 MG SL Tab Place 1 Tab under tongue as needed for Chest Pain. 25 Tab 1   • Magnesium 500 MG Cap Take 500 mg by mouth every evening.     • omeprazole (PRILOSEC) 20 MG delayed-release capsule Take 20 mg by mouth 1 time daily as needed. Indications: Heartburn     • aspirin EC (ECOTRIN) 81 MG Tablet Delayed Response Take 81 mg by mouth every evening.     • atorvastatin (LIPITOR) 40 MG Tab Take 40 mg by mouth every evening.     • metoprolol SR (TOPROL XL) 50 MG TABLET SR 24 HR Take 1 Tab by mouth every day. 90 Tab 3   • RESTASIS 0.05 % ophthalmic emulsion Place 1 Drop in both eyes 1 time daily as needed (For dry eye).     • tamsulosin (FLOMAX) 0.4 MG capsule Take 0.4 mg by mouth every bedtime.     • ibuprofen (MOTRIN) 800 MG Tab Take 800 mg by mouth every 8 hours as needed for Mild Pain.       No facility-administered encounter medications on file as of 8/20/2020.      Review of Systems   Constitutional: Negative for weight loss.   HENT: Negative for nosebleeds.    Respiratory: Negative for cough and shortness of breath.    Cardiovascular: Negative for chest pain, palpitations, orthopnea, leg swelling and PND.   Gastrointestinal: Negative for blood in stool and melena.   Genitourinary: Negative for hematuria.  "  Neurological: Negative for dizziness.        Objective:   /72 (BP Location: Left arm, Patient Position: Sitting, BP Cuff Size: Adult)   Pulse 65   Ht 1.651 m (5' 5\")   Wt 84.8 kg (187 lb)   SpO2 96%   BMI 31.12 kg/m²     Physical Exam   Constitutional: He appears well-developed. No distress.   Appears younger than stated age   HENT:   Head: Normocephalic and atraumatic.   Neck: No JVD present.   No carotid bruits   Cardiovascular: Normal rate, regular rhythm and intact distal pulses.   No murmur heard.  Pulmonary/Chest: He has no wheezes. He has no rales.   Pacemaker present in right upper chest without evidence   of erosion, drainage,or erythema.     Musculoskeletal:         General: Edema (1+ pitting edema on Left, none on right) present.   Skin: Skin is warm.   Mild rubor on lower leg on right   Psychiatric: Judgment normal.   Vitals reviewed.       MPI 9/3/18   Normal left ventricular size, ejection fraction, and wall motion.    There is an apparent small area of ischemia (reversible defect) in the apical region.    Non-reversible defect noted.      Nondiagnostic ECG portion of a Regadenoson nuclear stress test.    Atrial paced rhythm.    ECG INTERPRETATION    Nondiagnostic ECG portion of a Regadenoson nuclear stress test.     Assessment:     1. Cardiac pacemaker in situ     2. Mixed hyperlipidemia     3. Sick sinus syndrome (HCC)     4. Essential hypertension, benign     5. Coronary artery calcification seen on CAT scan         Medical Decision Making:  Today's Assessment / Status / Plan:    Mixed hyperlipidemia  Continue statin, [facility]      Cardiac pacemaker in situ   Sick sinus syndrome (HCC)  - device check in Oct, a-paced, rare v-paced, no mode switching, getting close to gen change      Coronary artery calcification seen on CAT scan  Dyslipidemia  Continue aspirin/statin. Did have mild anterior wall ischemia on previous stress test in 2018, treating medically.   - no anginal " symptoms  - NTG prn, ED precautions discussed.   - [facility], would continue strict lipid control given his positive stress test, active lifestyle      Leg swelling   - likely from some venous insuffiency, improves with  decreasing salt.       Palpitations, controlled  -continue metoprolol      Collaborating MD: Dr. Rose

## 2020-08-20 ENCOUNTER — DOCUMENTATION (OUTPATIENT)
Dept: CARDIOLOGY | Facility: MEDICAL CENTER | Age: 80
End: 2020-08-20

## 2020-08-20 ENCOUNTER — OFFICE VISIT (OUTPATIENT)
Dept: CARDIOLOGY | Facility: MEDICAL CENTER | Age: 80
End: 2020-08-20
Payer: MEDICARE

## 2020-08-20 VITALS
SYSTOLIC BLOOD PRESSURE: 122 MMHG | HEIGHT: 65 IN | OXYGEN SATURATION: 96 % | WEIGHT: 187 LBS | BODY MASS INDEX: 31.16 KG/M2 | DIASTOLIC BLOOD PRESSURE: 72 MMHG | HEART RATE: 65 BPM

## 2020-08-20 DIAGNOSIS — E78.2 MIXED HYPERLIPIDEMIA: ICD-10-CM

## 2020-08-20 DIAGNOSIS — Z95.0 CARDIAC PACEMAKER IN SITU: ICD-10-CM

## 2020-08-20 DIAGNOSIS — I25.10 CORONARY ARTERY CALCIFICATION SEEN ON CAT SCAN: ICD-10-CM

## 2020-08-20 DIAGNOSIS — I49.5 SICK SINUS SYNDROME (HCC): ICD-10-CM

## 2020-08-20 PROCEDURE — 99213 OFFICE O/P EST LOW 20 MIN: CPT | Performed by: PHYSICIAN ASSISTANT

## 2020-08-20 RX ORDER — VITAMIN E 268 MG
400 CAPSULE ORAL DAILY
COMMUNITY

## 2020-08-20 RX ORDER — VITAMIN B COMPLEX
1000 TABLET ORAL DAILY
COMMUNITY

## 2020-08-20 RX ORDER — MULTIVIT WITH MINERALS/LUTEIN
TABLET ORAL
COMMUNITY

## 2020-08-20 ASSESSMENT — ENCOUNTER SYMPTOMS
DIZZINESS: 0
SHORTNESS OF BREATH: 0
COUGH: 0
PALPITATIONS: 0
WEIGHT LOSS: 0
ORTHOPNEA: 0
PND: 0
BLOOD IN STOOL: 0

## 2020-08-20 ASSESSMENT — FIBROSIS 4 INDEX: FIB4 SCORE: 1.58

## 2020-08-20 NOTE — PROGRESS NOTES
[facility]    F:[phone]  P:[phone]    Request for recent Lipid&BMP.    Confirmation sent to scan

## 2020-09-15 ENCOUNTER — APPOINTMENT (RX ONLY)
Dept: URBAN - METROPOLITAN AREA CLINIC 20 | Facility: CLINIC | Age: 80
Setting detail: DERMATOLOGY
End: 2020-09-15

## 2020-09-15 DIAGNOSIS — Z71.89 OTHER SPECIFIED COUNSELING: ICD-10-CM

## 2020-09-15 DIAGNOSIS — D22 MELANOCYTIC NEVI: ICD-10-CM

## 2020-09-15 DIAGNOSIS — L82.0 INFLAMED SEBORRHEIC KERATOSIS: ICD-10-CM

## 2020-09-15 DIAGNOSIS — D18.0 HEMANGIOMA: ICD-10-CM

## 2020-09-15 DIAGNOSIS — L81.4 OTHER MELANIN HYPERPIGMENTATION: ICD-10-CM

## 2020-09-15 DIAGNOSIS — L82.1 OTHER SEBORRHEIC KERATOSIS: ICD-10-CM

## 2020-09-15 PROBLEM — D22.5 MELANOCYTIC NEVI OF TRUNK: Status: ACTIVE | Noted: 2020-09-15

## 2020-09-15 PROBLEM — D22.61 MELANOCYTIC NEVI OF RIGHT UPPER LIMB, INCLUDING SHOULDER: Status: ACTIVE | Noted: 2020-09-15

## 2020-09-15 PROBLEM — D18.01 HEMANGIOMA OF SKIN AND SUBCUTANEOUS TISSUE: Status: ACTIVE | Noted: 2020-09-15

## 2020-09-15 PROBLEM — D22.62 MELANOCYTIC NEVI OF LEFT UPPER LIMB, INCLUDING SHOULDER: Status: ACTIVE | Noted: 2020-09-15

## 2020-09-15 PROCEDURE — ? COUNSELING

## 2020-09-15 PROCEDURE — ? SUNSCREEN RECOMMENDATIONS

## 2020-09-15 PROCEDURE — 99214 OFFICE O/P EST MOD 30 MIN: CPT

## 2020-09-15 ASSESSMENT — LOCATION SIMPLE DESCRIPTION DERM
LOCATION SIMPLE: CHEST
LOCATION SIMPLE: RIGHT UPPER BACK
LOCATION SIMPLE: RIGHT HAND
LOCATION SIMPLE: RIGHT FOREARM
LOCATION SIMPLE: LEFT HAND
LOCATION SIMPLE: LEFT FOREARM
LOCATION SIMPLE: RIGHT FOREHEAD
LOCATION SIMPLE: UPPER BACK

## 2020-09-15 ASSESSMENT — LOCATION DETAILED DESCRIPTION DERM
LOCATION DETAILED: LEFT PROXIMAL DORSAL FOREARM
LOCATION DETAILED: INFERIOR THORACIC SPINE
LOCATION DETAILED: RIGHT VENTRAL PROXIMAL FOREARM
LOCATION DETAILED: RIGHT VENTRAL DISTAL FOREARM
LOCATION DETAILED: RIGHT MEDIAL SUPERIOR CHEST
LOCATION DETAILED: RIGHT RADIAL DORSAL HAND
LOCATION DETAILED: LEFT VENTRAL PROXIMAL FOREARM
LOCATION DETAILED: RIGHT INFERIOR UPPER BACK
LOCATION DETAILED: SUPERIOR THORACIC SPINE
LOCATION DETAILED: RIGHT PROXIMAL DORSAL FOREARM
LOCATION DETAILED: RIGHT INFERIOR MEDIAL FOREHEAD
LOCATION DETAILED: RIGHT MEDIAL UPPER BACK
LOCATION DETAILED: LEFT RADIAL DORSAL HAND

## 2020-09-15 ASSESSMENT — LOCATION ZONE DERM
LOCATION ZONE: ARM
LOCATION ZONE: HAND
LOCATION ZONE: TRUNK
LOCATION ZONE: FACE

## 2020-10-06 ENCOUNTER — NON-PROVIDER VISIT (OUTPATIENT)
Dept: CARDIOLOGY | Facility: MEDICAL CENTER | Age: 80
End: 2020-10-06
Payer: MEDICARE

## 2020-10-06 PROCEDURE — 99999 PR NO CHARGE: CPT | Performed by: INTERNAL MEDICINE

## 2021-01-08 ENCOUNTER — NON-PROVIDER VISIT (OUTPATIENT)
Dept: CARDIOLOGY | Facility: MEDICAL CENTER | Age: 81
End: 2021-01-08
Payer: MEDICARE

## 2021-01-08 ENCOUNTER — TELEPHONE (OUTPATIENT)
Dept: CARDIOLOGY | Facility: MEDICAL CENTER | Age: 81
End: 2021-01-08

## 2021-01-08 DIAGNOSIS — Z45.010 ELECTIVE REPLACEMENT INDICATED FOR CARDIAC PACEMAKER BATTERY AT END OF LIFESPAN: ICD-10-CM

## 2021-01-08 DIAGNOSIS — Z95.0 CARDIAC PACEMAKER IN SITU: ICD-10-CM

## 2021-01-08 PROCEDURE — 93280 PM DEVICE PROGR EVAL DUAL: CPT | Performed by: INTERNAL MEDICINE

## 2021-01-08 NOTE — TELEPHONE ENCOUNTER
"----- Message from Kendal Munoz sent at 1/8/2021  9:52 AM PST -----  Regarding: need gen change  Good morning Sonam,  Need gen change for Feb/March 2021 with DS please.  Medtronic dual chamber pacemaker  Leads are old  RA--1985  RV --1995  DS did last gen change 2012  Right-sided implant as he is an avid trap shooter.  Slightly [place].  Answers also to \"Skip\"  I told him he would he from you either today or next week.   Thanks   JH      "

## 2021-01-08 NOTE — NON-PROVIDER
Routine pmc-wnl.  To be scheduled for gen change Feb/March 2021  RA lead: DOI: 12-9-1985  4512 SN #[id number]  RV lead: DOI: 3-  4024  SN#[id number]    RA LEAD: UNIPOLAR ONLY

## 2021-01-08 NOTE — TELEPHONE ENCOUNTER
Angella,    Can you please enter the order for this PM gen change? DS device patient    Thank You,  Sonam

## 2021-01-12 ENCOUNTER — TELEPHONE (OUTPATIENT)
Dept: CARDIOLOGY | Facility: MEDICAL CENTER | Age: 81
End: 2021-01-12

## 2021-01-12 DIAGNOSIS — Z23 NEED FOR VACCINATION: ICD-10-CM

## 2021-01-18 ENCOUNTER — PRE-ADMISSION TESTING (OUTPATIENT)
Dept: ADMISSIONS | Facility: MEDICAL CENTER | Age: 81
End: 2021-01-18
Attending: INTERNAL MEDICINE
Payer: MEDICARE

## 2021-01-18 DIAGNOSIS — Z01.812 PRE-OPERATIVE LABORATORY EXAMINATION: ICD-10-CM

## 2021-01-18 DIAGNOSIS — Z01.810 PRE-OPERATIVE CARDIOVASCULAR EXAMINATION: ICD-10-CM

## 2021-01-18 LAB
ALBUMIN SERPL BCP-MCNC: 4.4 G/DL (ref 3.2–4.9)
ALBUMIN/GLOB SERPL: 1.8 G/DL
ALP SERPL-CCNC: 138 U/L (ref 30–99)
ALT SERPL-CCNC: 24 U/L (ref 2–50)
ANION GAP SERPL CALC-SCNC: 11 MMOL/L (ref 7–16)
AST SERPL-CCNC: 17 U/L (ref 12–45)
BASOPHILS # BLD AUTO: 0.7 % (ref 0–1.8)
BASOPHILS # BLD: 0.04 K/UL (ref 0–0.12)
BILIRUB SERPL-MCNC: 0.4 MG/DL (ref 0.1–1.5)
BUN SERPL-MCNC: 17 MG/DL (ref 8–22)
CALCIUM SERPL-MCNC: 9.3 MG/DL (ref 8.5–10.5)
CHLORIDE SERPL-SCNC: 108 MMOL/L (ref 96–112)
CO2 SERPL-SCNC: 22 MMOL/L (ref 20–33)
COVID ORDER STATUS COVID19: NORMAL
CREAT SERPL-MCNC: 1.05 MG/DL (ref 0.5–1.4)
EKG IMPRESSION: NORMAL
EOSINOPHIL # BLD AUTO: 0.33 K/UL (ref 0–0.51)
EOSINOPHIL NFR BLD: 5.4 % (ref 0–6.9)
ERYTHROCYTE [DISTWIDTH] IN BLOOD BY AUTOMATED COUNT: 51.6 FL (ref 35.9–50)
GLOBULIN SER CALC-MCNC: 2.4 G/DL (ref 1.9–3.5)
GLUCOSE SERPL-MCNC: 97 MG/DL (ref 65–99)
HCT VFR BLD AUTO: 48.3 % (ref 42–52)
HGB BLD-MCNC: 15.7 G/DL (ref 14–18)
IMM GRANULOCYTES # BLD AUTO: 0.03 K/UL (ref 0–0.11)
IMM GRANULOCYTES NFR BLD AUTO: 0.5 % (ref 0–0.9)
INR PPP: 0.96 (ref 0.87–1.13)
LYMPHOCYTES # BLD AUTO: 1.85 K/UL (ref 1–4.8)
LYMPHOCYTES NFR BLD: 30.2 % (ref 22–41)
MCH RBC QN AUTO: 32 PG (ref 27–33)
MCHC RBC AUTO-ENTMCNC: 32.5 G/DL (ref 33.7–35.3)
MCV RBC AUTO: 98.4 FL (ref 81.4–97.8)
MONOCYTES # BLD AUTO: 0.44 K/UL (ref 0–0.85)
MONOCYTES NFR BLD AUTO: 7.2 % (ref 0–13.4)
NEUTROPHILS # BLD AUTO: 3.44 K/UL (ref 1.82–7.42)
NEUTROPHILS NFR BLD: 56 % (ref 44–72)
NRBC # BLD AUTO: 0 K/UL
NRBC BLD-RTO: 0 /100 WBC
PLATELET # BLD AUTO: 224 K/UL (ref 164–446)
PMV BLD AUTO: 10.6 FL (ref 9–12.9)
POTASSIUM SERPL-SCNC: 4 MMOL/L (ref 3.6–5.5)
PROT SERPL-MCNC: 6.8 G/DL (ref 6–8.2)
PROTHROMBIN TIME: 13 SEC (ref 12–14.6)
RBC # BLD AUTO: 4.91 M/UL (ref 4.7–6.1)
SODIUM SERPL-SCNC: 141 MMOL/L (ref 135–145)
WBC # BLD AUTO: 6.1 K/UL (ref 4.8–10.8)

## 2021-01-18 PROCEDURE — U0005 INFEC AGEN DETEC AMPLI PROBE: HCPCS

## 2021-01-18 PROCEDURE — 80053 COMPREHEN METABOLIC PANEL: CPT

## 2021-01-18 PROCEDURE — 85025 COMPLETE CBC W/AUTO DIFF WBC: CPT

## 2021-01-18 PROCEDURE — 93010 ELECTROCARDIOGRAM REPORT: CPT | Performed by: INTERNAL MEDICINE

## 2021-01-18 PROCEDURE — 36415 COLL VENOUS BLD VENIPUNCTURE: CPT

## 2021-01-18 PROCEDURE — C9803 HOPD COVID-19 SPEC COLLECT: HCPCS

## 2021-01-18 PROCEDURE — 93005 ELECTROCARDIOGRAM TRACING: CPT

## 2021-01-18 PROCEDURE — U0003 INFECTIOUS AGENT DETECTION BY NUCLEIC ACID (DNA OR RNA); SEVERE ACUTE RESPIRATORY SYNDROME CORONAVIRUS 2 (SARS-COV-2) (CORONAVIRUS DISEASE [COVID-19]), AMPLIFIED PROBE TECHNIQUE, MAKING USE OF HIGH THROUGHPUT TECHNOLOGIES AS DESCRIBED BY CMS-2020-01-R: HCPCS

## 2021-01-18 PROCEDURE — 85610 PROTHROMBIN TIME: CPT

## 2021-01-18 RX ORDER — FLUTICASONE PROPIONATE 50 MCG
1 SPRAY, SUSPENSION (ML) NASAL DAILY
COMMUNITY

## 2021-01-18 ASSESSMENT — FIBROSIS 4 INDEX: FIB4 SCORE: 1.58

## 2021-01-19 LAB
SARS-COV-2 RNA RESP QL NAA+PROBE: NOTDETECTED
SPECIMEN SOURCE: NORMAL

## 2021-01-20 ENCOUNTER — HOSPITAL ENCOUNTER (OUTPATIENT)
Facility: MEDICAL CENTER | Age: 81
End: 2021-01-20
Attending: INTERNAL MEDICINE | Admitting: INTERNAL MEDICINE
Payer: MEDICARE

## 2021-01-20 ENCOUNTER — APPOINTMENT (OUTPATIENT)
Dept: CARDIOLOGY | Facility: MEDICAL CENTER | Age: 81
End: 2021-01-20
Attending: INTERNAL MEDICINE
Payer: MEDICARE

## 2021-01-20 ENCOUNTER — TELEPHONE (OUTPATIENT)
Dept: CARDIOLOGY | Facility: MEDICAL CENTER | Age: 81
End: 2021-01-20

## 2021-01-20 VITALS
TEMPERATURE: 97.9 F | BODY MASS INDEX: 30.89 KG/M2 | RESPIRATION RATE: 18 BRPM | DIASTOLIC BLOOD PRESSURE: 75 MMHG | OXYGEN SATURATION: 96 % | WEIGHT: 185.41 LBS | HEIGHT: 65 IN | SYSTOLIC BLOOD PRESSURE: 132 MMHG | HEART RATE: 75 BPM

## 2021-01-20 DIAGNOSIS — Z45.010 ELECTIVE REPLACEMENT INDICATED FOR CARDIAC PACEMAKER BATTERY AT END OF LIFESPAN: ICD-10-CM

## 2021-01-20 RX ORDER — VANCOMYCIN HYDROCHLORIDE 1 G/20ML
INJECTION, POWDER, LYOPHILIZED, FOR SOLUTION INTRAVENOUS
Status: COMPLETED
Start: 2021-01-20 | End: 2021-01-20

## 2021-01-20 RX ORDER — LIDOCAINE HYDROCHLORIDE 20 MG/ML
INJECTION, SOLUTION INFILTRATION; PERINEURAL
Status: COMPLETED
Start: 2021-01-20 | End: 2021-01-20

## 2021-01-20 ASSESSMENT — FIBROSIS 4 INDEX: FIB4 SCORE: 1.24

## 2021-01-20 NOTE — H&P
Physician H&P    Patient ID:  Pro Kearney  [id number]  80 y.o. male  1940    History:  Primary Diagnosis: For pacemaker generator change    HPI:  Sick sinus syndrome permanent pacemaker.  Atrial lead with 3 mm pin.  Not pacemaker dependent.  Not at [facility] yet.    Past Medical History:  has a past medical history of Arthritis, Back pain (June 2014), Calcification of coronary artery, Cardiac pacemaker in situ, Clostridium difficile infection, Hiatus hernia syndrome, HTN (hypertension) ( ), Hyperlipidemia, Lower urinary tract symptoms (LUTS), Nephrolithiasis, Pneumonia (2000), Sick sinus syndrome (HCC), and Unspecified disorder of middle ear and mastoid. He also has no past medical history of Cold or Dental disorder.  Past Surgical History:  has a past surgical history that includes appendectomy; cholecystectomy; cataract extraction with iol (Bilateral); recovery (11/28/2012); lumbar laminectomy diskectomy (6/25/2014); and pacemaker insertion (November 2012).  Past Social History:  reports that he quit smoking about 52 years ago. His smoking use included cigarettes. He has a 25.00 pack-year smoking history. He quit smokeless tobacco use about 14 years ago.  His smokeless tobacco use included snuff and chew. He reports that he does not drink alcohol or use drugs.  Past Family History:   Family History   Problem Relation Age of Onset   • Other Mother         leukemia   • Other Father         leukemia   • Cancer Sister      Allergies: Rocephin [ceftriaxone sodium], Codeine, Clindamycin, Morphine, and Shellfish-derived products    Current Medications:  Prior to Admission medications    Medication Sig Start Date End Date Taking? Authorizing Provider   vitamin k 100 MCG tablet Take 100 mcg by mouth every day.    Physician Outpatient   fluticasone (FLONASE) 50 MCG/ACT nasal spray Administer 1 Spray into affected nostril(S) every day.    Physician Outpatient   vitamin D (CHOLECALCIFEROL) 1000 Unit (25 mcg) Tab Take  "1,000 Units by mouth every day.    Physician Outpatient   vitamin e (VITAMIN E) 400 UNIT Cap Take 400 Units by mouth every day.    Physician Outpatient   Ascorbic Acid (VITAMIN C) 1000 MG Tab Take  by mouth.    Physician Outpatient   nitroglycerin (NITROSTAT) 0.4 MG SL Tab Place 1 Tab under tongue as needed for Chest Pain.  Patient not taking: Reported on 1/18/2021 2/14/20   Jaron Campbell M.D.   ibuprofen (MOTRIN) 800 MG Tab Take 800 mg by mouth every 8 hours as needed for Mild Pain.    Physician Outpatient   Magnesium 500 MG Cap Take 500 mg by mouth every evening.    Physician Outpatient   omeprazole (PRILOSEC) 20 MG delayed-release capsule Take 20 mg by mouth 1 time daily as needed. Indications: Heartburn    Physician Outpatient   aspirin EC (ECOTRIN) 81 MG Tablet Delayed Response Take 81 mg by mouth every evening.    Physician Outpatient   atorvastatin (LIPITOR) 40 MG Tab Take 40 mg by mouth every evening.    Physician Outpatient   metoprolol SR (TOPROL XL) 50 MG TABLET SR 24 HR Take 1 Tab by mouth every day. 6/25/19   Lori Mays A.P.N.   RESTASIS 0.05 % ophthalmic emulsion Place 1 Drop in both eyes 1 time daily as needed (For dry eye). 9/26/17   Physician Outpatient   tamsulosin (FLOMAX) 0.4 MG capsule Take 0.4 mg by mouth every bedtime.    Physician Outpatient       Review of Systems:  ROS  /75   Pulse 75   Temp 36.6 °C (97.9 °F) (Temporal)   Resp 18   Ht 1.651 m (5' 5\")   Wt 84.1 kg (185 lb 6.5 oz)   SpO2 96%     Physical Examination:  Physical Exam  Constitutional:       Appearance: Normal appearance. He is well-developed.   Eyes:      Conjunctiva/sclera: Conjunctivae normal.   Neck:      Thyroid: No thyroid mass.      Vascular: No JVD.   Cardiovascular:      Rate and Rhythm: Normal rate and regular rhythm.      Chest Wall: PMI is not displaced.      Pulses: Normal pulses.           Carotid pulses are 2+ on the right side and 2+ on the left side.       Radial pulses are 2+ on the right side and " 2+ on the left side.        Femoral pulses are 2+ on the right side and 2+ on the left side.       Dorsalis pedis pulses are 2+ on the right side and 2+ on the left side.      Heart sounds: S1 normal and S2 normal. No murmur. No gallop.       Comments: No peripheral edema.  Pulmonary:      Effort: Pulmonary effort is normal.      Breath sounds: Normal breath sounds.   Abdominal:      General: There is no abdominal bruit.      Palpations: Abdomen is soft.      Tenderness: There is no abdominal tenderness.   Musculoskeletal: Normal range of motion.      Thoracic back: He exhibits no tenderness and no spasm.   Skin:     Findings: No rash.      Nails: There is no clubbing.     Neurological:      Mental Status: He is alert and oriented to person, place, and time.         Impression:  1.  Permanent pacemaker near [facility].  Atrial lead with somewhat low impedance but normal function.  Case canceled.  [employer] to find proper sized header.          Matias Maldonado M.D.  1/20/2021

## 2021-01-20 NOTE — TELEPHONE ENCOUNTER
Medtronic has the wrong PPM header and the procedure needs to be rescheduled. Joel will call you when he has the device

## 2021-01-20 NOTE — OR NURSING
Procedure canceled due to not having proper device to complete generator change. Procedure to be rescheduled when device is available. PIV removed, tip intact. Patient verbalizes understanding. Patient discharged to home with spouse.

## 2021-01-20 NOTE — OR NURSING
COVID-19 Pre-Surgery Screenin. Do you have an undiagnosed respiratory illness or symptoms such as coughing, sneezing or sore throat? Any loss of sense of smell or taste? no        2. Do you have an unexplained fever greater than 100.4 degrees Fahrenheit or 38 degrees Celsius? no  ?  3. Have you had direct exposure to a patient who tested positive for Covid-19? no    4. Patient informed of current visitation and mask policies by this RN.

## 2021-01-26 DIAGNOSIS — Z45.010 ELECTIVE REPLACEMENT INDICATED FOR CARDIAC PACEMAKER BATTERY AT END OF LIFESPAN: ICD-10-CM

## 2021-01-26 NOTE — TELEPHONE ENCOUNTER
Patient scheduled for PM gen change on 2-2-21 with Dr. Maldonado. Patient has been instructed to check in at 8:00 for 10:00 case time. Message sent to authorizations. ROSELINE Maldonado with [employer].

## 2021-01-28 ENCOUNTER — PRE-ADMISSION TESTING (OUTPATIENT)
Dept: ADMISSIONS | Facility: MEDICAL CENTER | Age: 81
End: 2021-01-28
Attending: INTERNAL MEDICINE
Payer: MEDICARE

## 2021-01-28 DIAGNOSIS — Z01.812 PRE-OPERATIVE LABORATORY EXAMINATION: ICD-10-CM

## 2021-02-01 ENCOUNTER — PRE-ADMISSION TESTING (OUTPATIENT)
Dept: ADMISSIONS | Facility: MEDICAL CENTER | Age: 81
End: 2021-02-01
Attending: INTERNAL MEDICINE
Payer: MEDICARE

## 2021-02-01 DIAGNOSIS — Z01.812 PRE-OPERATIVE LABORATORY EXAMINATION: ICD-10-CM

## 2021-02-01 DIAGNOSIS — Z01.810 PRE-OPERATIVE CARDIOVASCULAR EXAMINATION: ICD-10-CM

## 2021-02-01 LAB
ALBUMIN SERPL BCP-MCNC: 4.5 G/DL (ref 3.2–4.9)
ALBUMIN/GLOB SERPL: 1.7 G/DL
ALP SERPL-CCNC: 156 U/L (ref 30–99)
ALT SERPL-CCNC: 25 U/L (ref 2–50)
ANION GAP SERPL CALC-SCNC: 12 MMOL/L (ref 7–16)
AST SERPL-CCNC: 21 U/L (ref 12–45)
BASOPHILS # BLD AUTO: 1.1 % (ref 0–1.8)
BASOPHILS # BLD: 0.06 K/UL (ref 0–0.12)
BILIRUB SERPL-MCNC: 0.6 MG/DL (ref 0.1–1.5)
BUN SERPL-MCNC: 17 MG/DL (ref 8–22)
CALCIUM SERPL-MCNC: 9.3 MG/DL (ref 8.5–10.5)
CHLORIDE SERPL-SCNC: 105 MMOL/L (ref 96–112)
CO2 SERPL-SCNC: 23 MMOL/L (ref 20–33)
COVID ORDER STATUS COVID19: NORMAL
CREAT SERPL-MCNC: 1.1 MG/DL (ref 0.5–1.4)
EKG IMPRESSION: NORMAL
EOSINOPHIL # BLD AUTO: 0.4 K/UL (ref 0–0.51)
EOSINOPHIL NFR BLD: 7.3 % (ref 0–6.9)
ERYTHROCYTE [DISTWIDTH] IN BLOOD BY AUTOMATED COUNT: 52.2 FL (ref 35.9–50)
GLOBULIN SER CALC-MCNC: 2.6 G/DL (ref 1.9–3.5)
GLUCOSE SERPL-MCNC: 107 MG/DL (ref 65–99)
HCT VFR BLD AUTO: 51.4 % (ref 42–52)
HGB BLD-MCNC: 16.9 G/DL (ref 14–18)
IMM GRANULOCYTES # BLD AUTO: 0.02 K/UL (ref 0–0.11)
IMM GRANULOCYTES NFR BLD AUTO: 0.4 % (ref 0–0.9)
INR PPP: 0.99 (ref 0.87–1.13)
LYMPHOCYTES # BLD AUTO: 1.92 K/UL (ref 1–4.8)
LYMPHOCYTES NFR BLD: 34.9 % (ref 22–41)
MCH RBC QN AUTO: 32.9 PG (ref 27–33)
MCHC RBC AUTO-ENTMCNC: 32.9 G/DL (ref 33.7–35.3)
MCV RBC AUTO: 100 FL (ref 81.4–97.8)
MONOCYTES # BLD AUTO: 0.35 K/UL (ref 0–0.85)
MONOCYTES NFR BLD AUTO: 6.4 % (ref 0–13.4)
NEUTROPHILS # BLD AUTO: 2.75 K/UL (ref 1.82–7.42)
NEUTROPHILS NFR BLD: 49.9 % (ref 44–72)
NRBC # BLD AUTO: 0 K/UL
NRBC BLD-RTO: 0 /100 WBC
PLATELET # BLD AUTO: 197 K/UL (ref 164–446)
PMV BLD AUTO: 10.2 FL (ref 9–12.9)
POTASSIUM SERPL-SCNC: 4.4 MMOL/L (ref 3.6–5.5)
PROT SERPL-MCNC: 7.1 G/DL (ref 6–8.2)
PROTHROMBIN TIME: 13.4 SEC (ref 12–14.6)
RBC # BLD AUTO: 5.14 M/UL (ref 4.7–6.1)
SARS-COV+SARS-COV-2 AG RESP QL IA.RAPID: NOTDETECTED
SODIUM SERPL-SCNC: 140 MMOL/L (ref 135–145)
SPECIMEN SOURCE: NORMAL
WBC # BLD AUTO: 5.5 K/UL (ref 4.8–10.8)

## 2021-02-01 PROCEDURE — 93005 ELECTROCARDIOGRAM TRACING: CPT

## 2021-02-01 PROCEDURE — 85025 COMPLETE CBC W/AUTO DIFF WBC: CPT

## 2021-02-01 PROCEDURE — 80053 COMPREHEN METABOLIC PANEL: CPT

## 2021-02-01 PROCEDURE — 85610 PROTHROMBIN TIME: CPT

## 2021-02-01 PROCEDURE — 93010 ELECTROCARDIOGRAM REPORT: CPT | Performed by: INTERNAL MEDICINE

## 2021-02-01 PROCEDURE — 36415 COLL VENOUS BLD VENIPUNCTURE: CPT

## 2021-02-01 PROCEDURE — 87426 SARSCOV CORONAVIRUS AG IA: CPT

## 2021-02-01 PROCEDURE — C9803 HOPD COVID-19 SPEC COLLECT: HCPCS

## 2021-02-02 ENCOUNTER — APPOINTMENT (OUTPATIENT)
Dept: CARDIOLOGY | Facility: MEDICAL CENTER | Age: 81
End: 2021-02-02
Attending: INTERNAL MEDICINE
Payer: MEDICARE

## 2021-02-02 ENCOUNTER — HOSPITAL ENCOUNTER (OUTPATIENT)
Facility: MEDICAL CENTER | Age: 81
End: 2021-02-02
Attending: INTERNAL MEDICINE | Admitting: INTERNAL MEDICINE
Payer: MEDICARE

## 2021-02-02 VITALS
HEART RATE: 74 BPM | HEIGHT: 65 IN | SYSTOLIC BLOOD PRESSURE: 102 MMHG | TEMPERATURE: 96.8 F | BODY MASS INDEX: 30.89 KG/M2 | RESPIRATION RATE: 17 BRPM | WEIGHT: 185.41 LBS | DIASTOLIC BLOOD PRESSURE: 65 MMHG | OXYGEN SATURATION: 92 %

## 2021-02-02 DIAGNOSIS — Z45.010 ELECTIVE REPLACEMENT INDICATED FOR CARDIAC PACEMAKER BATTERY AT END OF LIFESPAN: ICD-10-CM

## 2021-02-02 PROCEDURE — 160002 HCHG RECOVERY MINUTES (STAT)

## 2021-02-02 PROCEDURE — 700101 HCHG RX REV CODE 250

## 2021-02-02 PROCEDURE — 700111 HCHG RX REV CODE 636 W/ 250 OVERRIDE (IP)

## 2021-02-02 PROCEDURE — 99153 MOD SED SAME PHYS/QHP EA: CPT

## 2021-02-02 PROCEDURE — 33228 REMV&REPLC PM GEN DUAL LEAD: CPT | Performed by: INTERNAL MEDICINE

## 2021-02-02 PROCEDURE — 99152 MOD SED SAME PHYS/QHP 5/>YRS: CPT | Performed by: INTERNAL MEDICINE

## 2021-02-02 RX ORDER — VANCOMYCIN HYDROCHLORIDE 1 G/20ML
INJECTION, POWDER, LYOPHILIZED, FOR SOLUTION INTRAVENOUS
Status: COMPLETED
Start: 2021-02-02 | End: 2021-02-02

## 2021-02-02 RX ORDER — DOXYCYCLINE 100 MG/1
100 CAPSULE ORAL 2 TIMES DAILY
Qty: 8 CAP | Refills: 0 | Status: SHIPPED | OUTPATIENT
Start: 2021-02-02

## 2021-02-02 RX ORDER — MIDAZOLAM HYDROCHLORIDE 1 MG/ML
INJECTION INTRAMUSCULAR; INTRAVENOUS
Status: COMPLETED
Start: 2021-02-02 | End: 2021-02-02

## 2021-02-02 RX ORDER — LIDOCAINE HYDROCHLORIDE 20 MG/ML
INJECTION, SOLUTION INFILTRATION; PERINEURAL
Status: COMPLETED
Start: 2021-02-02 | End: 2021-02-02

## 2021-02-02 RX ORDER — ACETAMINOPHEN 325 MG/1
650 TABLET ORAL EVERY 4 HOURS PRN
Status: DISCONTINUED | OUTPATIENT
Start: 2021-02-02 | End: 2021-02-02 | Stop reason: HOSPADM

## 2021-02-02 RX ADMIN — MIDAZOLAM HYDROCHLORIDE 4 MG: 1 INJECTION, SOLUTION INTRAMUSCULAR; INTRAVENOUS at 10:41

## 2021-02-02 RX ADMIN — FENTANYL CITRATE 100 MCG: 50 INJECTION, SOLUTION INTRAMUSCULAR; INTRAVENOUS at 10:41

## 2021-02-02 RX ADMIN — LIDOCAINE HYDROCHLORIDE: 20 INJECTION, SOLUTION INFILTRATION; PERINEURAL at 10:42

## 2021-02-02 RX ADMIN — VANCOMYCIN HYDROCHLORIDE 1250 MG: 1 INJECTION, POWDER, LYOPHILIZED, FOR SOLUTION INTRAVENOUS at 10:10

## 2021-02-02 RX ADMIN — VANCOMYCIN HYDROCHLORIDE 1 G: 1 INJECTION, POWDER, LYOPHILIZED, FOR SOLUTION INTRAVENOUS at 10:43

## 2021-02-02 ASSESSMENT — FIBROSIS 4 INDEX: FIB4 SCORE: 1.71

## 2021-02-02 NOTE — H&P
Physician H&P    Patient ID:  Pro Kearney  [id number]  80 y.o. male  1940    History:  Primary Diagnosis: Pacemaker at [facility]    HPI:  History of sick sinus syndrome.  Atrial lead from 1985 [place].  RV lead 1995 with [facility].  Borderline low impedance in the atrial lead but normal function.  No constitutional symptoms.  No chest pain or shortness of breath.    Past Medical History:  has a past medical history of Arthritis, Back pain (June 2014), Calcification of coronary artery, Cardiac pacemaker in situ, Clostridium difficile infection, Hiatus hernia syndrome, HTN (hypertension) ( ), Hyperlipidemia, Lower urinary tract symptoms (LUTS), Nephrolithiasis, Pneumonia (2000), Sick sinus syndrome (HCC), and Unspecified disorder of middle ear and mastoid. He also has no past medical history of Cold or Dental disorder.  Past Surgical History:  has a past surgical history that includes appendectomy; cholecystectomy; cataract extraction with iol (Bilateral); recovery (11/28/2012); lumbar laminectomy diskectomy (6/25/2014); and pacemaker insertion (November 2012).  Past Social History:  reports that he quit smoking about 52 years ago. His smoking use included cigarettes. He has a 25.00 pack-year smoking history. He quit smokeless tobacco use about 14 years ago.  His smokeless tobacco use included snuff and chew. He reports that he does not drink alcohol or use drugs.  Past Family History:   Family History   Problem Relation Age of Onset   • Other Mother         leukemia   • Other Father         leukemia   • Cancer Sister      Allergies: Rocephin [ceftriaxone sodium], Codeine, Clindamycin, Morphine, and Shellfish-derived products    Current Medications:  Prior to Admission medications    Medication Sig Start Date End Date Taking? Authorizing Provider   vitamin k 100 MCG tablet Take 100 mcg by mouth every day.    Physician Outpatient   fluticasone (FLONASE) 50 MCG/ACT nasal spray Administer 1 Spray into affected  "nostril(S) every day.    Physician Outpatient   vitamin D (CHOLECALCIFEROL) 1000 Unit (25 mcg) Tab Take 1,000 Units by mouth every day.    Physician Outpatient   vitamin e (VITAMIN E) 400 UNIT Cap Take 400 Units by mouth every day.    Physician Outpatient   Ascorbic Acid (VITAMIN C) 1000 MG Tab Take  by mouth.    Physician Outpatient   nitroglycerin (NITROSTAT) 0.4 MG SL Tab Place 1 Tab under tongue as needed for Chest Pain.  Patient not taking: Reported on 1/18/2021 2/14/20   Jaron Campbell M.D.   ibuprofen (MOTRIN) 800 MG Tab Take 800 mg by mouth every 8 hours as needed for Mild Pain.    Physician Outpatient   Magnesium 500 MG Cap Take 500 mg by mouth every evening.    Physician Outpatient   omeprazole (PRILOSEC) 20 MG delayed-release capsule Take 20 mg by mouth 1 time daily as needed. Indications: Heartburn    Physician Outpatient   aspirin EC (ECOTRIN) 81 MG Tablet Delayed Response Take 81 mg by mouth every evening.    Physician Outpatient   atorvastatin (LIPITOR) 40 MG Tab Take 40 mg by mouth every evening.    Physician Outpatient   metoprolol SR (TOPROL XL) 50 MG TABLET SR 24 HR Take 1 Tab by mouth every day. 6/25/19   JUAN [id number]   RESTASIS 0.05 % ophthalmic emulsion Place 1 Drop in both eyes 1 time daily as needed (For dry eye). 9/26/17   Physician Outpatient   tamsulosin (FLOMAX) 0.4 MG capsule Take 0.4 mg by mouth every bedtime.    Physician Outpatient       Review of Systems:  ROS  /70   Pulse 70   Temp 36.8 °C (98.2 °F) (Temporal)   Resp 18   Ht 1.651 m (5' 5\")   Wt 84.1 kg (185 lb 6.5 oz)   SpO2 94%     Physical Examination:  Physical Exam  Constitutional:       Appearance: Normal appearance. He is well-developed.   Eyes:      Conjunctiva/sclera: Conjunctivae normal.   Neck:      Thyroid: No thyroid mass.      Vascular: No JVD.   Cardiovascular:      Rate and Rhythm: Normal rate and regular rhythm.      Chest Wall: PMI is not displaced.      Pulses: Normal pulses.           Carotid " pulses are 2+ on the right side and 2+ on the left side.       Radial pulses are 2+ on the right side and 2+ on the left side.        Femoral pulses are 2+ on the right side and 2+ on the left side.       Dorsalis pedis pulses are 2+ on the right side and 2+ on the left side.      Heart sounds: S1 normal and S2 normal. No murmur. No gallop.       Comments: No peripheral edema.  Right-sided device  Pulmonary:      Effort: Pulmonary effort is normal.      Breath sounds: Normal breath sounds.   Abdominal:      General: There is no abdominal bruit.      Palpations: Abdomen is soft.      Tenderness: There is no abdominal tenderness.   Musculoskeletal: Normal range of motion.      Thoracic back: He exhibits no tenderness and no spasm.   Skin:     Findings: No rash.      Nails: There is no clubbing.     Neurological:      Mental Status: He is alert and oriented to person, place, and time.         Impression:  1.  Permanent pacemaker at [facility] for generator change.  The risks, benefits, and alternatives to permanent pacemaker replacement were discussed in great detail.  Specific risks mentioned to the patient including bleeding, cardiac perforation with possible tamponade possibly requiring pericardiocentesis or open heart surgery.  In addition the possibility of lead dislodgment (2-3%), pneumothorax (3%), hemothorax, infection were discussed.  Also, mentioned were the risk of death, stroke, and myocardial infarction.  The patient verbalized understanding of the potential complications and wishes to proceed with the procedure.

## 2021-02-02 NOTE — OP REPORT
Electrophysiology Procedure Note  [facility]    PROCEDURE: Dual-chamber pacemaker generator change,   moderate sedation administered by RN and supervised by physician    : Matias Maldonado M.D.    ANESTHESIA: Moderate sedation,  start time 1010, stop time 1059  the moderate sedation document has been reviewed, signed and scanned into media     ESTIMATED BLOOD LOSS: 20 cc.    SPECIMENS: None.    COMPLICATIONS: None    INDICATION: Device at [facility]    PRE-PROCEDURE ECG: Dual-chamber pacing    POST-PROCEDURE ECG: Dual-chamber pacing    DESCRIPTION OF PROCEDURE: After informed written consent, the patient was brought to the electrophysiology lab in the fasting, unsedated state. The patient was prepped and draped in the usual sterile fashion. The procedure was performed under moderate sedation with local anesthetic. An incision was made with a scalpel along the old scar. Access to the device pocket was made using a combination of blunt dissection and electrocautery. The old generator was freed from adhesions and the generator disconnected from the leads. Leads tested fine.  The pocket was irrigated with antibiotic solution, and the new generator was connected to the leads and inserted back in the pocket. The wound was closed with three layers of absorbable sutures and covered with Steri-Strips.   I personally supervised the administration of moderate sedation by the RN and observed the level of consciousness and physiologic status throughout the procedure.  Following recovery from sedation, the patient was transferred to a monitored bed in good condition.    IMPLANTED DEVICE INFORMATION:    Pulse generator is a Medtronic model ADDR03  Serial number [id number]     LEAD INFORMATION:  1. Right atrial lead is a Medtronic model 894737, serial number [id number], P wave 3 millivolts, threshold 0.5 volts, pacing impedance 194 ohms (bipolar), implant date 12/9/1985  2. Right ventricular lead is a Medtronic  model 123092, serial number [id number], R wave 12 millivolts, threshold 0.5 volts, pacing impedance 741 ohms, implant date 3/16/1995      DEVICE PROGRAMMING:    As previous programmed     IMPRESSIONS:  1. Successful dual-chamber pacemaker generator change.    RECOMMENDATIONS:  1. Transfer to PPU.  2. Follow-up in device clinic for wound check and device interrogation.

## 2021-02-02 NOTE — OR NURSING
1117   PT RECEIVED FROM CATH LAB,  S/P PPM GENERATOR CHANGED.  PT IS A/A/OX4.  DENIES ANY PAIN.  A VERY SMALL SPOT OF BLOOD, SIZE OF A NICKEL NOTICED ON DRESSING.  5 LB SANDBAG APPLIED.  PT IS VERY [place]      1130   PT TAKING PO FLUID WELL.  DRESSING REMAINS UNCHANGED.  DENIES ANY PAIN.    1230  PT RESTING QUIETLY.  DENIES ANY PAIN.  DRESSING REMAINS UNCHANGED.    1300   DC INSTRUCTIONS GIVEN TO PT.  VERBALIZED UNDERSTANDING OF ALL.  PT UP TO BATHROOM TO VOID.  HL DC.  PT DC TO HOME, VIA WHEELCHAIR,  ESCORTED OUT BY CNA.

## 2021-02-03 NOTE — DISCHARGE INSTRUCTIONS
`  ACTIVITY: Rest and take it easy for the first 24 hours.  A responsible adult is recommended to remain with you during that time.  It is normal to feel sleepy.  We encourage you to not do anything that requires balance, judgment or coordination.    MILD FLU-LIKE SYMPTOMS ARE NORMAL. YOU MAY EXPERIENCE GENERALIZED MUSCLE ACHES, THROAT IRRITATION, HEADACHE AND/OR SOME NAUSEA.    FOR 24 HOURS DO NOT:  Drive, operate machinery or run household appliances.  Drink beer or alcoholic beverages.   Make important decisions or sign legal documents.    SPECIAL INSTRUCTIONS:     Pacemaker Battery Change, Care After  This sheet gives you information about how to care for yourself after your procedure. Your health care provider may also give you more specific instructions. If you have problems or questions, contact your health care provider.  What can I expect after the procedure?  After your procedure, it is common to have:  · Pain or soreness at the site where the pacemaker was inserted.  · Swelling at the site where the pacemaker was inserted.  Follow these instructions at home:  Incision care  · Keep the incision clean and dry.  ? Do not take baths, swim, or use a hot tub until your health care provider approves.  ? You may shower the day after your procedure, or as directed by your health care provider.  ? Pat the area dry with a clean towel. Do not rub the area. This may cause bleeding.  · Follow instructions from your health care provider about how to take care of your incision. Make sure you:  ? Wash your hands with soap and water before you change your bandage (dressing). If soap and water are not available, use hand .  ? Change your dressing as told by your health care provider.  ? Leave stitches (sutures), skin glue, or adhesive strips in place. These skin closures may need to stay in place for 2 weeks or longer. If adhesive strip edges start to loosen and curl up, you may trim the loose edges. Do not remove  adhesive strips completely unless your health care provider tells you to do that.  · Check your incision area every day for signs of infection. Check for:  ? More redness, swelling, or pain.  ? More fluid or blood.  ? Warmth.  ? Pus or a bad smell.  Activity  · Do not lift anything that is heavier than 10 lb (4.5 kg) until your health care provider says it is okay to do so.  · For the first 2 weeks, or as long as told by your health care provider:  ? Avoid lifting your left arm higher than your shoulder.  ? Be gentle when you move your arms over your head. It is okay to raise your arm to comb your hair.  ? Avoid strenuous exercise.  · Ask your health care provider when it is okay to:  ? Resume your normal activities.  ? Return to work or school.  ? Resume sexual activity.  Eating and drinking  · Eat a heart-healthy diet. This should include plenty of fresh fruits and vegetables, whole grains, low-fat dairy products, and lean protein like chicken and fish.  · Limit alcohol intake to no more than 1 drink a day for non-pregnant women and 2 drinks a day for men. One drink equals 12 oz of beer, 5 oz of wine, or 1½ oz of hard liquor.  · Check ingredients and nutrition facts on packaged foods and beverages. Avoid the following types of food:  ? Food that is high in salt (sodium).  ? Food that is high in saturated fat, like full-fat dairy or red meat.  ? Food that is high in trans fat, like fried food.  ? Food and drinks that are high in sugar.  Lifestyle  · Do not use any products that contain nicotine or tobacco, such as cigarettes and e-cigarettes. If you need help quitting, ask your health care provider.  · Take steps to manage and control your weight.  · Get regular exercise. Aim for 150 minutes of moderate-intensity exercise (such as walking or yoga) or 75 minutes of vigorous exercise (such as running or swimming) each week.  · Manage other health problems, such as diabetes or high blood pressure. Ask your health care  provider how you can manage these conditions.  General instructions  · Do not drive for 24 hours after your procedure if you were given a medicine to help you relax (sedative).  · Take over-the-counter and prescription medicines only as told by your health care provider.  · Avoid putting pressure on the area where the pacemaker was placed.  · If you need an MRI after your pacemaker has been placed, be sure to tell the health care provider who orders the MRI that you have a pacemaker.  · Avoid close and prolonged exposure to electrical devices that have strong magnetic fields. These include:  ? Cell phones. Avoid keeping them in a pocket near the pacemaker, and try using the ear opposite the pacemaker.  ? MP3 players.  ? Household appliances, like microwaves.  ? Metal detectors.  ? Electric generators.  ? High-tension wires.  · Keep all follow-up visits as directed by your health care provider. This is important.  Contact a health care provider if:  · You have pain at the incision site that is not relieved by over-the-counter or prescription medicines.  · You have any of these around your incision site or coming from it:  ? More redness, swelling, or pain.  ? Fluid or blood.  ? Warmth to the touch.  ? Pus or a bad smell.  · You have a fever.  · You feel brief, occasional palpitations, light-headedness, or any symptoms that you think might be related to your heart.  Get help right away if:  · You experience chest pain that is different from the pain at the pacemaker site.  · You develop a red streak that extends above or below the incision site.  · You experience shortness of breath.  · You have palpitations or an irregular heartbeat.  · You have light-headedness that does not go away quickly.  · You faint or have dizzy spells.  · Your pulse suddenly drops or increases rapidly and does not return to normal.  · You begin to gain weight and your legs and ankles swell.  Summary  · After your procedure, it is common to  have pain, soreness, and some swelling where the pacemaker was inserted.  · Make sure to keep your incision clean and dry. Follow instructions from your health care provider about how to take care of your incision.  · Check your incision every day for signs of infection, such as more pain or swelling, pus or a bad smell, warmth, or leaking fluid and blood.  · Avoid strenuous exercise and lifting your left arm higher than your shoulder for 2 weeks, or as long as told by your health care provider.  This information is not intended to replace advice given to you by your health care provider. Make sure you discuss any questions you have with your health care provider.  Document Released: 10/08/2014 Document Revised: 11/30/2018 Document Reviewed: 11/09/2017  [employer] © 2020 [employer]    DIET: To avoid nausea, slowly advance diet as tolerated, avoiding spicy or greasy foods for the first day.  Add more substantial food to your diet according to your physician's instructions.  INCREASE FLUIDS AND FIBER TO AVOID CONSTIPATION.    SURGICAL DRESSING/BATHING: Keep dressing and incision dry and intact for 7 days. Sponge bath only for 7 days. May remove dressing and shower after follow-up appointment. If steri strips in place underneath outer dressing, please allow to fall off on their own.     FOLLOW-UP APPOINTMENT:  A follow-up appointment should be arranged with your Cardiologist; call to schedule.    You should CALL YOUR PHYSICIAN if you develop:  Fever greater than 101 degrees F.  Pain not relieved by medication, or persistent nausea or vomiting.  Excessive bleeding (blood soaking through dressing) or unexpected drainage from the wound.  Extreme redness or swelling around the incision site, drainage of pus or foul smelling drainage.  Inability to urinate or empty your bladder within 8 hours.  Problems with breathing or chest pain.    You should call 911 if you develop problems with breathing or chest  pain.  If you are unable to contact your doctor or surgical center, you should go to the nearest emergency room or urgent care center.  Physician's telephone #: [phone]    If any questions arise, call your doctor.  If your doctor is not available, please feel free to call the Surgical Center at [phone]. The Contact Center is open Monday through Friday 7AM to 5PM and may speak to a nurse at [phone], or toll free at [phone].     A registered nurse may call you a few days after your surgery to see how you are doing after your procedure.    MEDICATIONS: Resume taking daily medication.  Take prescribed pain medication with food.  If no medication is prescribed, you may take non-aspirin pain medication if needed.  PAIN MEDICATION CAN BE VERY CONSTIPATING.  Take a stool softener or laxative such as senokot, pericolace, or milk of magnesia if needed.    If your physician has prescribed pain medication that includes Acetaminophen (Tylenol), do not take additional Acetaminophen (Tylenol) while taking the prescribed medication.    Depression / Suicide Risk    As you are discharged from this [facility] facility, it is important to learn how to keep safe from harming yourself.    Recognize the warning signs:  · Abrupt changes in personality, positive or negative- including increase in energy   · Giving away possessions  · Change in eating patterns- significant weight changes-  positive or negative  · Change in sleeping patterns- unable to sleep or sleeping all the time   · Unwillingness or inability to communicate  · Depression  · Unusual sadness, discouragement and loneliness  · Talk of wanting to die  · Neglect of personal appearance   · Rebelliousness- reckless behavior  · Withdrawal from people/activities they love  · Confusion- inability to concentrate     If you or a loved one observes any of these behaviors or has concerns about self-harm, here's what you can do:  · Talk about it- your feelings and  reasons for harming yourself  · Remove any means that you might use to hurt yourself (examples: pills, rope, extension cords, firearm)  · Get professional help from the community (Mental Health, Substance Abuse, psychological counseling)  · Do not be alone:Call your Safe Contact- someone whom you trust who will be there for you.  · Call your local CRISIS HOTLINE 002-8252 or 067-543-0750  · Call your local Children's Mobile Crisis Response Team [place] (976) 693-1777 or www.[employer]  · Call the toll free National Suicide Prevention Hotlines   · National Suicide Prevention Lifeline 615-566-BHSX (8297)  · National Hope Line Network 800-SUICIDE (913-0941)

## 2021-02-10 ENCOUNTER — NON-PROVIDER VISIT (OUTPATIENT)
Dept: CARDIOLOGY | Facility: MEDICAL CENTER | Age: 81
End: 2021-02-10
Payer: MEDICARE

## 2021-02-10 DIAGNOSIS — Z95.0 CARDIAC PACEMAKER IN SITU: ICD-10-CM

## 2021-02-10 PROCEDURE — 93280 PM DEVICE PROGR EVAL DUAL: CPT | Performed by: INTERNAL MEDICINE

## 2021-02-11 NOTE — NON-PROVIDER
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 Appropriate device function demonstrated. Wound site appears clear. Advised to watch for redness, swelling, oozing or fever. Pt verbalized understanding. See back in 6 weeks.

## 2021-03-24 ENCOUNTER — NON-PROVIDER VISIT (OUTPATIENT)
Dept: CARDIOLOGY | Facility: MEDICAL CENTER | Age: 81
End: 2021-03-24
Payer: MEDICARE

## 2021-03-24 DIAGNOSIS — Z95.0 CARDIAC PACEMAKER IN SITU: ICD-10-CM

## 2021-03-24 PROCEDURE — 93280 PM DEVICE PROGR EVAL DUAL: CPT | Performed by: INTERNAL MEDICINE

## 2021-03-25 NOTE — NON-PROVIDER
Final testing. Appropriate device function demonstrated. Wound site appears clear. See back in 6 months.

## 2021-05-07 ENCOUNTER — HOSPITAL ENCOUNTER (EMERGENCY)
Dept: HOSPITAL 8 - ED | Age: 81
Discharge: HOME | End: 2021-05-07
Payer: MEDICARE

## 2021-05-07 VITALS — SYSTOLIC BLOOD PRESSURE: 131 MMHG | DIASTOLIC BLOOD PRESSURE: 86 MMHG

## 2021-05-07 VITALS — WEIGHT: 186.73 LBS | HEIGHT: 65 IN | BODY MASS INDEX: 31.11 KG/M2

## 2021-05-07 DIAGNOSIS — R94.31: ICD-10-CM

## 2021-05-07 DIAGNOSIS — R06.00: Primary | ICD-10-CM

## 2021-05-07 DIAGNOSIS — I10: ICD-10-CM

## 2021-05-07 LAB
ALBUMIN SERPL-MCNC: 3.5 G/DL (ref 3.4–5)
ALP SERPL-CCNC: 142 U/L (ref 45–117)
ALT SERPL-CCNC: 28 U/L (ref 12–78)
ANION GAP SERPL CALC-SCNC: 5 MMOL/L (ref 5–15)
BASOPHILS # BLD AUTO: 0.1 X10^3/UL (ref 0–0.1)
BASOPHILS NFR BLD AUTO: 1 % (ref 0–1)
BILIRUB SERPL-MCNC: 0.6 MG/DL (ref 0.2–1)
CALCIUM SERPL-MCNC: 8.4 MG/DL (ref 8.5–10.1)
CHLORIDE SERPL-SCNC: 112 MMOL/L (ref 98–107)
CREAT SERPL-MCNC: 1.26 MG/DL (ref 0.7–1.3)
EOSINOPHIL # BLD AUTO: 0.8 X10^3/UL (ref 0–0.4)
EOSINOPHIL NFR BLD AUTO: 13 % (ref 1–7)
ERYTHROCYTE [DISTWIDTH] IN BLOOD BY AUTOMATED COUNT: 14.9 % (ref 9.4–14.8)
LYMPHOCYTES # BLD AUTO: 1.6 X10^3/UL (ref 1–3.4)
LYMPHOCYTES NFR BLD AUTO: 25 % (ref 22–44)
MCH RBC QN AUTO: 31.7 PG (ref 27.5–34.5)
MCHC RBC AUTO-ENTMCNC: 33.6 G/DL (ref 33.2–36.2)
MD: NO
MONOCYTES # BLD AUTO: 0.6 X10^3/UL (ref 0.2–0.8)
MONOCYTES NFR BLD AUTO: 9 % (ref 2–9)
NEUTROPHILS # BLD AUTO: 3.5 X10^3/UL (ref 1.8–6.8)
NEUTROPHILS NFR BLD AUTO: 53 % (ref 42–75)
PLATELET # BLD AUTO: 197 X10^3/UL (ref 130–400)
PMV BLD AUTO: 7.8 FL (ref 7.4–10.4)
PROT SERPL-MCNC: 6.8 G/DL (ref 6.4–8.2)
RBC # BLD AUTO: 4.66 X10^6/UL (ref 4.38–5.82)

## 2021-05-07 PROCEDURE — 80053 COMPREHEN METABOLIC PANEL: CPT

## 2021-05-07 PROCEDURE — 99285 EMERGENCY DEPT VISIT HI MDM: CPT

## 2021-05-07 PROCEDURE — 36415 COLL VENOUS BLD VENIPUNCTURE: CPT

## 2021-05-07 PROCEDURE — 85025 COMPLETE CBC W/AUTO DIFF WBC: CPT

## 2021-05-07 PROCEDURE — 71045 X-RAY EXAM CHEST 1 VIEW: CPT

## 2021-05-07 PROCEDURE — 83880 ASSAY OF NATRIURETIC PEPTIDE: CPT

## 2021-05-07 PROCEDURE — 93005 ELECTROCARDIOGRAM TRACING: CPT

## 2021-05-07 NOTE — NUR
Pt arrived with the concern that he has coughing fits for the last few days and 
that he might have coughed up blood. Pt stated that he coughed up "a big bloob 
up and it might be blood or a piece of hamburger" since he coughed up this 
substance he has no longer had coughing fits. Pt connected to BP and O2 
monitors, EKG done, VSS, NADN. No other requests at this time

## 2021-05-11 ENCOUNTER — APPOINTMENT (RX ONLY)
Dept: URBAN - METROPOLITAN AREA CLINIC 20 | Facility: CLINIC | Age: 81
Setting detail: DERMATOLOGY
End: 2021-05-11

## 2021-05-11 DIAGNOSIS — D22 MELANOCYTIC NEVI: ICD-10-CM

## 2021-05-11 DIAGNOSIS — D18.0 HEMANGIOMA: ICD-10-CM

## 2021-05-11 DIAGNOSIS — L82.1 OTHER SEBORRHEIC KERATOSIS: ICD-10-CM

## 2021-05-11 DIAGNOSIS — Z71.89 OTHER SPECIFIED COUNSELING: ICD-10-CM

## 2021-05-11 DIAGNOSIS — L81.4 OTHER MELANIN HYPERPIGMENTATION: ICD-10-CM

## 2021-05-11 PROBLEM — D22.61 MELANOCYTIC NEVI OF RIGHT UPPER LIMB, INCLUDING SHOULDER: Status: ACTIVE | Noted: 2021-05-11

## 2021-05-11 PROBLEM — D22.62 MELANOCYTIC NEVI OF LEFT UPPER LIMB, INCLUDING SHOULDER: Status: ACTIVE | Noted: 2021-05-11

## 2021-05-11 PROBLEM — D22.5 MELANOCYTIC NEVI OF TRUNK: Status: ACTIVE | Noted: 2021-05-11

## 2021-05-11 PROBLEM — D48.5 NEOPLASM OF UNCERTAIN BEHAVIOR OF SKIN: Status: ACTIVE | Noted: 2021-05-11

## 2021-05-11 PROBLEM — D18.01 HEMANGIOMA OF SKIN AND SUBCUTANEOUS TISSUE: Status: ACTIVE | Noted: 2021-05-11

## 2021-05-11 PROCEDURE — 69100 BIOPSY OF EXTERNAL EAR: CPT

## 2021-05-11 PROCEDURE — ? SUNSCREEN RECOMMENDATIONS

## 2021-05-11 PROCEDURE — 11102 TANGNTL BX SKIN SINGLE LES: CPT | Mod: 59

## 2021-05-11 PROCEDURE — 99213 OFFICE O/P EST LOW 20 MIN: CPT | Mod: 25

## 2021-05-11 PROCEDURE — ? BIOPSY BY SHAVE METHOD

## 2021-05-11 PROCEDURE — ? COUNSELING

## 2021-05-11 ASSESSMENT — LOCATION DETAILED DESCRIPTION DERM
LOCATION DETAILED: RIGHT INFERIOR MEDIAL FOREHEAD
LOCATION DETAILED: RIGHT RADIAL DORSAL HAND
LOCATION DETAILED: LEFT PROXIMAL DORSAL FOREARM
LOCATION DETAILED: INFERIOR THORACIC SPINE
LOCATION DETAILED: LEFT VENTRAL PROXIMAL FOREARM
LOCATION DETAILED: RIGHT MEDIAL UPPER BACK
LOCATION DETAILED: RIGHT INFERIOR UPPER BACK
LOCATION DETAILED: RIGHT VENTRAL DISTAL FOREARM
LOCATION DETAILED: RIGHT POSTERIOR SHOULDER
LOCATION DETAILED: RIGHT PROXIMAL DORSAL FOREARM
LOCATION DETAILED: LEFT RADIAL DORSAL HAND
LOCATION DETAILED: SUPERIOR THORACIC SPINE

## 2021-05-11 ASSESSMENT — LOCATION SIMPLE DESCRIPTION DERM
LOCATION SIMPLE: RIGHT SHOULDER
LOCATION SIMPLE: RIGHT HAND
LOCATION SIMPLE: RIGHT FOREARM
LOCATION SIMPLE: RIGHT UPPER BACK
LOCATION SIMPLE: UPPER BACK
LOCATION SIMPLE: RIGHT FOREHEAD
LOCATION SIMPLE: LEFT HAND
LOCATION SIMPLE: LEFT FOREARM

## 2021-05-11 ASSESSMENT — LOCATION ZONE DERM
LOCATION ZONE: FACE
LOCATION ZONE: TRUNK
LOCATION ZONE: HAND
LOCATION ZONE: ARM

## 2021-05-11 NOTE — PROCEDURE: COUNSELING
Detail Level: Zone
Detail Level: Detailed
Sunscreen Recommendations: Reviewed sun protection including SPF 30 or higher, reapplication every 2 hours while in direct sunlight, sunglasses, wide brim hats, UPF clothing.

## 2021-06-15 ENCOUNTER — APPOINTMENT (RX ONLY)
Dept: URBAN - METROPOLITAN AREA CLINIC 36 | Facility: CLINIC | Age: 81
Setting detail: DERMATOLOGY
End: 2021-06-15

## 2021-06-15 PROBLEM — C44.212 BASAL CELL CARCINOMA OF SKIN OF RIGHT EAR AND EXTERNAL AURICULAR CANAL: Status: ACTIVE | Noted: 2021-06-15

## 2021-06-15 PROCEDURE — 14060 TIS TRNFR E/N/E/L 10 SQ CM/<: CPT

## 2021-06-15 PROCEDURE — 17311 MOHS 1 STAGE H/N/HF/G: CPT

## 2021-06-15 PROCEDURE — 17312 MOHS ADDL STAGE: CPT

## 2021-06-15 PROCEDURE — ? MOHS SURGERY

## 2021-06-15 NOTE — PROCEDURE: MOHS SURGERY
Mohs Case Number: [id number]
Previous Accession (Optional): [id number]
Biopsy Photograph Reviewed: Yes
Referring Physician (Optional): hay
Consent Type: Consent 1 (Standard)
Eye Shield Used: No
Surgeon Performing Repair (Optional): Fadumo
Initial Size Of Lesion: 0.3
Number Of Stages: 2
Primary Defect Length In Cm (Final Defect Size - Required For Flaps/Grafts): 0.7
Repair Type: Flap
Oculoplastic Surgeon (A): Chin
Oculoplastic Surgeon Procedure Text (A): After obtaining clear surgical margins the patient was sent to oculoplastics for surgical repair.  The patient understands they will receive post-surgical care and follow-up from the referring physician's office.
Otolaryngologist Procedure Text (A): After obtaining clear surgical margins the patient was sent to otolaryngology for surgical repair.  The patient understands they will receive post-surgical care and follow-up from the referring physician's office.
Plastic Surgeon Procedure Text (A): After obtaining clear surgical margins the patient was sent to plastics for surgical repair.  The patient understands they will receive post-surgical care and follow-up from the referring physician's office.
Mid-Level Procedure Text (A): After obtaining clear surgical margins the patient was sent to a mid-level provider for surgical repair.  The patient understands they will receive post-surgical care and follow-up from the mid-level provider.
Provider Procedure Text (A): After obtaining clear surgical margins the defect was repaired by another provider.
Asc Procedure Text (A): After obtaining clear surgical margins the patient was sent to an ASC for surgical repair.  The patient understands they will receive post-surgical care and follow-up from the ASC physician.
Suturegard Retention Suture: 2-0 Nylon
Retention Suture Bite Size: 3 mm
Length To Time In Minutes Device Was In Place: 10
Number Of Hemigard Strips Per Side: 1
Simple / Intermediate / Complex Repair - Final Wound Length In Cm: 0
Undermining Type: Entire Wound
Debridement Text: The wound edges were debrided prior to proceeding with the closure to facilitate wound healing.
Helical Rim Text: The closure involved the helical rim.
Vermilion Border Text: The closure involved the vermilion border.
Nostril Rim Text: The closure involved the nostril rim.
Retention Suture Text: Retention sutures were placed to support the closure and prevent dehiscence.
Flap Type: Island Pedicle Flap
Secondary Defect Length In Cm (Required For Flaps): 2.8
Secondary Defect Width In Cm (Required For Flaps): 1.4
Area H Indication Text: Tumors in this location are included in Area H (eyelids, eyebrows, nose, lips, chin, ear, pre-auricular, post-auricular, temple, genitalia, hands, feet, ankles and areola).  Tissue conservation is critical in these anatomic locations.
Area M Indication Text: Tumors in this location are included in Area M (cheek, forehead, scalp, neck, jawline and pretibial skin).  Mohs surgery is indicated for tumors in these anatomic locations.
Area L Indication Text: Tumors in this location are included in Area L (trunk and extremities).  Mohs surgery is indicated for larger tumors, or tumors with aggressive histologic features, in these anatomic locations.
Special Stains Stage 1 - Results: Base On Clearance Noted Above
Stage 2: Additional Anesthesia Type: 1% lidocaine with 1:100,000 epinephrine and 408mcg clindamycin/ml and a 1:10 solution of 8.4% sodium bicarbonate
Stage 4: Additional Anesthesia Type: 1% lidocaine with epinephrine
Include Tumor Staging In Mohs Note?: Please Select the Appropriate Response
Staging Info: By selecting yes to the question above you will include information on AJCC 8 tumor staging in your Mohs note. Information on tumor staging will be automatically added for SCCs on the head and neck. AJCC 8 includes tumor size, tumor depth, perineural involvement and bone invasion.
Tumor Depth: Less than 6mm from granular layer and no invasion beyond the subcutaneous fat
Medical Necessity Statement: Based on my medical judgement, Mohs surgery is the most appropriate treatment for this cancer compared to other treatments.
Alternatives Discussed Intro (Do Not Add Period): I discussed alternative treatments to Mohs surgery and specifically discussed the risks and benefits of
Consent 1/Introductory Paragraph: The rationale for Mohs was explained to the patient and consent was obtained. The risks, benefits and alternatives to therapy were discussed in detail. Specifically, the risks of infection, scarring, bleeding, prolonged wound healing, incomplete removal, allergy to anesthesia, nerve injury and recurrence were addressed. Prior to the procedure, the treatment site was clearly identified and confirmed by the patient. All components of Universal Protocol/PAUSE Rule completed.
Consent 2/Introductory Paragraph: Mohs surgery was explained to the patient and consent was obtained. The risks, benefits and alternatives to therapy were discussed in detail. Specifically, the risks of infection, scarring, bleeding, prolonged wound healing, incomplete removal, allergy to anesthesia, nerve injury and recurrence were addressed. Prior to the procedure, the treatment site was clearly identified and confirmed by the patient. All components of Universal Protocol/PAUSE Rule completed.
Consent 3/Introductory Paragraph: I gave the patient a chance to ask questions they had about the procedure.  Following this I explained the Mohs procedure and consent was obtained. The risks, benefits and alternatives to therapy were discussed in detail. Specifically, the risks of infection, scarring, bleeding, prolonged wound healing, incomplete removal, allergy to anesthesia, nerve injury and recurrence were addressed. Prior to the procedure, the treatment site was clearly identified and confirmed by the patient. All components of Universal Protocol/PAUSE Rule completed.
Consent (Temporal Branch)/Introductory Paragraph: The rationale for Mohs was explained to the patient and consent was obtained. The risks, benefits and alternatives to therapy were discussed in detail. Specifically, the risks of damage to the temporal branch of the facial nerve, infection, scarring, bleeding, prolonged wound healing, incomplete removal, allergy to anesthesia, and recurrence were addressed. Prior to the procedure, the treatment site was clearly identified and confirmed by the patient. All components of Universal Protocol/PAUSE Rule completed.
Consent (Marginal Mandibular)/Introductory Paragraph: The rationale for Mohs was explained to the patient and consent was obtained. The risks, benefits and alternatives to therapy were discussed in detail. Specifically, the risks of damage to the marginal mandibular branch of the facial nerve, infection, scarring, bleeding, prolonged wound healing, incomplete removal, allergy to anesthesia, and recurrence were addressed. Prior to the procedure, the treatment site was clearly identified and confirmed by the patient. All components of Universal Protocol/PAUSE Rule completed.
Consent (Spinal Accessory)/Introductory Paragraph: The rationale for Mohs was explained to the patient and consent was obtained. The risks, benefits and alternatives to therapy were discussed in detail. Specifically, the risks of damage to the spinal accessory nerve, infection, scarring, bleeding, prolonged wound healing, incomplete removal, allergy to anesthesia, and recurrence were addressed. Prior to the procedure, the treatment site was clearly identified and confirmed by the patient. All components of Universal Protocol/PAUSE Rule completed.
Consent (Near Eyelid Margin)/Introductory Paragraph: The rationale for Mohs was explained to the patient and consent was obtained. The risks, benefits and alternatives to therapy were discussed in detail. Specifically, the risks of ectropion or eyelid deformity, infection, scarring, bleeding, prolonged wound healing, incomplete removal, allergy to anesthesia, nerve injury and recurrence were addressed. Prior to the procedure, the treatment site was clearly identified and confirmed by the patient. All components of Universal Protocol/PAUSE Rule completed.
Consent (Ear)/Introductory Paragraph: The rationale for Mohs was explained to the patient and consent was obtained. The risks, benefits and alternatives to therapy were discussed in detail. Specifically, the risks of ear deformity, infection, scarring, bleeding, prolonged wound healing, incomplete removal, allergy to anesthesia, nerve injury and recurrence were addressed. Prior to the procedure, the treatment site was clearly identified and confirmed by the patient. All components of Universal Protocol/PAUSE Rule completed.
Consent (Nose)/Introductory Paragraph: The rationale for Mohs was explained to the patient and consent was obtained. The risks, benefits and alternatives to therapy were discussed in detail. Specifically, the risks of nasal deformity, changes in the flow of air through the nose, infection, scarring, bleeding, prolonged wound healing, incomplete removal, allergy to anesthesia, nerve injury and recurrence were addressed. Prior to the procedure, the treatment site was clearly identified and confirmed by the patient. All components of Universal Protocol/PAUSE Rule completed.
Consent (Lip)/Introductory Paragraph: The rationale for Mohs was explained to the patient and consent was obtained. The risks, benefits and alternatives to therapy were discussed in detail. Specifically, the risks of lip deformity, changes in the oral aperture, infection, scarring, bleeding, prolonged wound healing, incomplete removal, allergy to anesthesia, nerve injury and recurrence were addressed. Prior to the procedure, the treatment site was clearly identified and confirmed by the patient. All components of Universal Protocol/PAUSE Rule completed.
Consent (Scalp)/Introductory Paragraph: The rationale for Mohs was explained to the patient and consent was obtained. The risks, benefits and alternatives to therapy were discussed in detail. Specifically, the risks of changes in hair growth pattern secondary to repair, infection, scarring, bleeding, prolonged wound healing, incomplete removal, allergy to anesthesia, nerve injury and recurrence were addressed. Prior to the procedure, the treatment site was clearly identified and confirmed by the patient. All components of Universal Protocol/PAUSE Rule completed.
Detail Level: Detailed
Postop Diagnosis: same
Anesthesia Type: 0.5% lidocaine with 1:200,000 epinephrine and a 1:10 solution of 8.4% sodium bicarbonate and 408mcg clindamycin/ml
Anesthesia Volume In Cc: 6
Hemostasis: Electrocautery
Estimated Blood Loss (Cc): less than 5 cc
Repair Anesthesia Method: local infiltration
Brow Lift Text: A midfrontal incision was made medially to the defect to allow access to the tissues just superior to the left eyebrow. Following careful dissection inferiorly in a supraperiosteal plane to the level of the left eyebrow, several 3-0 monocryl sutures were used to resuspend the eyebrow orbicularis oculi muscular unit to the superior frontal bone periosteum. This resulted in an appropriate reapproximation of static eyebrow symmetry and correction of the left brow ptosis.
Deep Sutures: 5-0 Vicryl
Epidermal Sutures: 5-0 Vicryl Rapide
Epidermal Closure: simple interrupted
Suturegard Intro: Intraoperative tissue expansion was performed, utilizing the SUTUREGARD device, in order to reduce wound tension.
Suturegard Body: The suture ends were repeatedly re-tightened and re-clamped to achieve the desired tissue expansion.
Hemigard Intro: Due to skin fragility and wound tension, it was decided to use HEMIGARD adhesive retention suture devices to permit a linear closure. The skin was cleaned and dried for a 6cm distance away from the wound. Excessive hair, if present, was removed to allow for adhesion.
Hemigard Postcare Instructions: The HEMIGARD strips are to remain completely dry for at least 5-7 days.
Donor Site Anesthesia Type: same as repair anesthesia
Graft Basting Suture (Optional): 5-0 Fast Absorbing Gut
Graft Donor Site Epidermal Sutures (Optional): 5-0 Ethibond
Graft Donor Site Bandage (Optional-Leave Blank If You Don't Want In Note): Aquaphor and telefa placed on wound. Pressure dressing applied to donor site
Closure 2 Information: This tab is for additional flaps and grafts, including complex repair and grafts and complex repair and flaps. You can also specify a different location for the additional defect, if the location is the same you do not need to select a new one. We will insert the automated text for the repair you select below just as we do for solitary flaps and grafts. Please note that at this time if you select a location with a different insurance zone you will need to override the ICD10 and CPT if appropriate.
Closure 3 Information: This tab is for additional flaps and grafts above and beyond our usual structured repairs.  Please note if you enter information here it will not currently bill and you will need to add the billing information manually.
Wound Care: Aquaphor
Dressing: dry sterile dressing
Wound Care (No Sutures): Petrolatum
Suture Removal: 7 days
Unna Boot Text: An Unna boot was placed to help immobilize the limb and facilitate more rapid healing.
Home Suture Removal Text: Patient was provided instructions on removing sutures and will remove their sutures at home.  If they have any questions or difficulties they will call the office.
Post-Care Instructions: I reviewed with the patient in detail post-care instructions. Patient is not to engage in any heavy lifting, exercise, or swimming for the next 14 days. Should the patient develop any fevers, chills, bleeding, severe pain patient will contact the office immediately.
Pain Refusal Text: I offered to prescribe pain medication but the patient refused to take this medication.
Mauc Instructions: By selecting yes to the question below the MAUC number will be added into the note.  This will be calculated automatically based on the diagnosis chosen, the size entered, the body zone selected (H,M,L) and the specific indications you chose. You will also have the option to override the Mohs AUC if you disagree with the automatically calculated number and this option is found in the Case Summary tab.
Where Do You Want The Question To Include Opioid Counseling Located?: Case Summary Tab
Eye Protection Verbiage: Before proceeding with the stage, a plastic scleral shield was inserted. The globe was anesthetized with a few drops of 1% lidocaine with 1:100,000 epinephrine. Then, an appropriate sized scleral shield was chosen and coated with lacrilube ointment. The shield was gently inserted and left in place for the duration of each stage. After the stage was completed, the shield was gently removed.
Mohs Method Verbiage: An incision at a 45 degree angle following the standard Mohs approach was done and the specimen was harvested as a microscopic controlled layer.
Surgeon/Pathologist Verbiage (Will Incorporate Name Of Surgeon From Intro If Not Blank): operated in two distinct and integrated capacities as the surgeon and pathologist.
Mohs Histo Method Verbiage: Each section was then chromacoded and processed in the Mohs lab using the Mohs protocol and submitted for frozen section.
Subsequent Stages Histo Method Verbiage: Using a similar technique to that described above, a thin layer of tissue was removed from all areas where tumor was visible on the previous stage.  The tissue was again oriented, mapped, dyed, and processed as above.
Mohs Rapid Report Verbiage: The area of clinically evident tumor was marked with skin marking ink and appropriately hatched.  The initial incision was made following the Mohs approach through the skin.  The specimen was taken to the lab, divided into the necessary number of pieces, chromacoded and processed according to the Mohs protocol.  This was repeated in successive stages until a tumor free defect was achieved.
Complex Repair Preamble Text (Leave Blank If You Do Not Want): Extensive wide undermining was performed at least 2 cm in all directions.
Intermediate Repair Preamble Text (Leave Blank If You Do Not Want): Undermining was performed with blunt dissection.
M-Plasty Complex Repair Preamble Text (Leave Blank If You Do Not Want): Extensive wide undermining was performed.
Non-Graft Cartilage Fenestration Text: The cartilage was fenestrated with a 2mm punch biopsy to help facilitate healing.
Graft Cartilage Fenestration Text: The cartilage was fenestrated with a 2mm punch biopsy to help facilitate graft survival and healing.
Secondary Intention Text (Leave Blank If You Do Not Want): The defect will heal with secondary intention.
No Repair - Repaired With Adjacent Surgical Defect Text (Leave Blank If You Do Not Want): After obtaining clear surgical margins the defect was repaired concurrently with another surgical defect which was in close approximation.
Advancement Flap (Single) Text: The defect edges were debeveled with a #15 scalpel blade.  Given the location of the defect and the proximity to free margins a single advancement flap was deemed most appropriate.  Using a sterile surgical marker, an appropriate advancement flap was drawn incorporating the defect and placing the expected incisions within the relaxed skin tension lines where possible.    The area thus outlined was incised deep to adipose tissue with a #15 scalpel blade.  The skin margins were undermined to an appropriate distance in all directions utilizing iris scissors.
Advancement Flap (Double) Text: The defect edges were debeveled with a #15 scalpel blade.  Given the location of the defect and the proximity to free margins a double advancement flap was deemed most appropriate.  Using a sterile surgical marker, the appropriate advancement flaps were drawn incorporating the defect and placing the expected incisions within the relaxed skin tension lines where possible.    The area thus outlined was incised deep to adipose tissue with a #15 scalpel blade.  The skin margins were undermined to an appropriate distance in all directions utilizing iris scissors.
Burow's Advancement Flap Text: The defect edges were debeveled with a #15 scalpel blade.  Given the location of the defect and the proximity to free margins a Burow's advancement flap was deemed most appropriate.  Using a sterile surgical marker, the appropriate advancement flap was drawn incorporating the defect and placing the expected incisions within the relaxed skin tension lines where possible.    The area thus outlined was incised deep to adipose tissue with a #15 scalpel blade.  The skin margins were undermined to an appropriate distance in all directions utilizing iris scissors.
Chonodrocutaneous Helical Advancement Flap Text: The defect edges were debeveled with a #15 scalpel blade.  Given the location of the defect and the proximity to free margins a chondrocutaneous helical advancement flap was deemed most appropriate.  Using a sterile surgical marker, the appropriate advancement flap was drawn incorporating the defect and placing the expected incisions within the relaxed skin tension lines where possible.    The area thus outlined was incised deep to adipose tissue with a #15 scalpel blade.  The skin margins were undermined to an appropriate distance in all directions utilizing iris scissors.
Crescentic Advancement Flap Text: The defect edges were debeveled with a #15 scalpel blade.  Given the location of the defect and the proximity to free margins a crescentic advancement flap was deemed most appropriate.  Using a sterile surgical marker, the appropriate advancement flap was drawn incorporating the defect and placing the expected incisions within the relaxed skin tension lines where possible.    The area thus outlined was incised deep to adipose tissue with a #15 scalpel blade.  The skin margins were undermined to an appropriate distance in all directions utilizing iris scissors.
A-T Advancement Flap Text: The defect edges were debeveled with a #15 scalpel blade.  Given the location of the defect, shape of the defect and the proximity to free margins an A-T advancement flap was deemed most appropriate.  Using a sterile surgical marker, an appropriate advancement flap was drawn incorporating the defect and placing the expected incisions within the relaxed skin tension lines where possible.    The area thus outlined was incised deep to adipose tissue with a #15 scalpel blade.  The skin margins were undermined to an appropriate distance in all directions utilizing iris scissors.
O-T Advancement Flap Text: The defect edges were debeveled with a #15 scalpel blade.  Given the location of the defect, shape of the defect and the proximity to free margins an O-T advancement flap was deemed most appropriate.  Using a sterile surgical marker, an appropriate advancement flap was drawn incorporating the defect and placing the expected incisions within the relaxed skin tension lines where possible.    The area thus outlined was incised deep to adipose tissue with a #15 scalpel blade.  The skin margins were undermined to an appropriate distance in all directions utilizing iris scissors.
O-L Flap Text: The defect edges were debeveled with a #15 scalpel blade.  Given the location of the defect, shape of the defect and the proximity to free margins an O-L flap was deemed most appropriate.  Using a sterile surgical marker, an appropriate advancement flap was drawn incorporating the defect and placing the expected incisions within the relaxed skin tension lines where possible.    The area thus outlined was incised deep to adipose tissue with a #15 scalpel blade.  The skin margins were undermined to an appropriate distance in all directions utilizing iris scissors.
O-Z Flap Text: The defect edges were debeveled with a #15 scalpel blade.  Given the location of the defect, shape of the defect and the proximity to free margins an O-Z flap was deemed most appropriate.  Using a sterile surgical marker, an appropriate transposition flap was drawn incorporating the defect and placing the expected incisions within the relaxed skin tension lines where possible. The area thus outlined was incised deep to adipose tissue with a #15 scalpel blade.  The skin margins were undermined to an appropriate distance in all directions utilizing iris scissors.
Double O-Z Flap Text: The defect edges were debeveled with a #15 scalpel blade.  Given the location of the defect, shape of the defect and the proximity to free margins a Double O-Z flap was deemed most appropriate.  Using a sterile surgical marker, an appropriate transposition flap was drawn incorporating the defect and placing the expected incisions within the relaxed skin tension lines where possible. The area thus outlined was incised deep to adipose tissue with a #15 scalpel blade.  The skin margins were undermined to an appropriate distance in all directions utilizing iris scissors.
V-Y Flap Text: The defect edges were debeveled with a #15 scalpel blade.  Given the location of the defect, shape of the defect and the proximity to free margins a V-Y flap was deemed most appropriate.  Using a sterile surgical marker, an appropriate advancement flap was drawn incorporating the defect and placing the expected incisions within the relaxed skin tension lines where possible.    The area thus outlined was incised deep to adipose tissue with a #15 scalpel blade.  The skin margins were undermined to an appropriate distance in all directions utilizing iris scissors.
Advancement-Rotation Flap Text: The defect edges were debeveled with a #15 scalpel blade.  Given the location of the defect, shape of the defect and the proximity to free margins an advancement-rotation flap was deemed most appropriate.  Using a sterile surgical marker, an appropriate flap was drawn incorporating the defect and placing the expected incisions within the relaxed skin tension lines where possible. The area thus outlined was incised deep to adipose tissue with a #15 scalpel blade.  The skin margins were undermined to an appropriate distance in all directions utilizing iris scissors.
Mercedes Flap Text: The defect edges were debeveled with a #15 scalpel blade.  Given the location of the defect, shape of the defect and the proximity to free margins a Mercedes flap was deemed most appropriate.  Using a sterile surgical marker, an appropriate advancement flap was drawn incorporating the defect and placing the expected incisions within the relaxed skin tension lines where possible. The area thus outlined was incised deep to adipose tissue with a #15 scalpel blade.  The skin margins were undermined to an appropriate distance in all directions utilizing iris scissors.
Modified Advancement Flap Text: The defect edges were debeveled with a #15 scalpel blade.  Given the location of the defect, shape of the defect and the proximity to free margins a modified advancement flap was deemed most appropriate.  Using a sterile surgical marker, an appropriate advancement flap was drawn incorporating the defect and placing the expected incisions within the relaxed skin tension lines where possible.    The area thus outlined was incised deep to adipose tissue with a #15 scalpel blade.  The skin margins were undermined to an appropriate distance in all directions utilizing iris scissors.
Mucosal Advancement Flap Text: Given the location of the defect, shape of the defect and the proximity to free margins a mucosal advancement flap was deemed most appropriate. Incisions were made with a 15 blade scalpel in the appropriate fashion along the cutaneous vermilion border and the mucosal lip. The remaining actinically damaged mucosal tissue was excised.  The mucosal advancement flap was then elevated to the gingival sulcus with care taken to preserve the neurovascular structures and advanced into the primary defect. Care was taken to ensure that precise realignment of the vermilion border was achieved.
Peng Advancement Flap Text: The defect edges were debeveled with a #15 scalpel blade.  Given the location of the defect, shape of the defect and the proximity to free margins a Peng advancement flap was deemed most appropriate.  Using a sterile surgical marker, an appropriate advancement flap was drawn incorporating the defect and placing the expected incisions within the relaxed skin tension lines where possible. The area thus outlined was incised deep to adipose tissue with a #15 scalpel blade.  The skin margins were undermined to an appropriate distance in all directions utilizing iris scissors.
Hatchet Flap Text: The defect edges were debeveled with a #15 scalpel blade.  Given the location of the defect, shape of the defect and the proximity to free margins a hatchet flap based from the glabella was deemed most appropriate.  Using a sterile surgical marker, an appropriate glabellar hatchet flap was drawn incorporating the defect and placing the expected incisions within the relaxed skin tension lines where possible.    The area thus outlined was incised deep to adipose tissue with a #15 scalpel blade.  The skin margins were undermined to an appropriate distance in all directions utilizing iris scissors.
Rotation Flap Text: The defect edges were debeveled with a #15 scalpel blade.  Given the location of the defect, shape of the defect and the proximity to free margins a rotation flap was deemed most appropriate.  Using a sterile surgical marker, an appropriate rotation flap was drawn incorporating the defect and placing the expected incisions within the relaxed skin tension lines where possible.    The area thus outlined was incised deep to adipose tissue with a #15 scalpel blade.  The skin margins were undermined to an appropriate distance in all directions utilizing iris scissors.
Spiral Flap Text: The defect edges were debeveled with a #15 scalpel blade.  Given the location of the defect, shape of the defect and the proximity to free margins a spiral flap was deemed most appropriate.  Using a sterile surgical marker, an appropriate rotation flap was drawn incorporating the defect and placing the expected incisions within the relaxed skin tension lines where possible. The area thus outlined was incised deep to adipose tissue with a #15 scalpel blade.  The skin margins were undermined to an appropriate distance in all directions utilizing iris scissors.
Star Wedge Flap Text: The defect edges were debeveled with a #15 scalpel blade.  Given the location of the defect, shape of the defect and the proximity to free margins a star wedge flap was deemed most appropriate.  Using a sterile surgical marker, an appropriate rotation flap was drawn incorporating the defect and placing the expected incisions within the relaxed skin tension lines where possible. The area thus outlined was incised deep to adipose tissue with a #15 scalpel blade.  The skin margins were undermined to an appropriate distance in all directions utilizing iris scissors.
Transposition Flap Text: The defect edges were debeveled with a #15 scalpel blade.  Given the location of the defect and the proximity to free margins a transposition flap was deemed most appropriate.  Using a sterile surgical marker, an appropriate transposition flap was drawn incorporating the defect.    The area thus outlined was incised deep to adipose tissue with a #15 scalpel blade.  The skin margins were undermined to an appropriate distance in all directions utilizing iris scissors.
Muscle Hinge Flap Text: The defect edges were debeveled with a #15 scalpel blade.  Given the size, depth and location of the defect and the proximity to free margins a muscle hinge flap was deemed most appropriate.  Using a sterile surgical marker, an appropriate hinge flap was drawn incorporating the defect. The area thus outlined was incised with a #15 scalpel blade.  The skin margins were undermined to an appropriate distance in all directions utilizing iris scissors.
Nasal Turnover Hinge Flap Text: The defect edges were debeveled with a #15 scalpel blade.  Given the size, depth, location of the defect and the defect being full thickness a nasal turnover hinge flap was deemed most appropriate.  Using a sterile surgical marker, an appropriate hinge flap was drawn incorporating the defect. The area thus outlined was incised with a #15 scalpel blade. The flap was designed to recreate the nasal mucosal lining and the alar rim. The skin margins were undermined to an appropriate distance in all directions utilizing iris scissors.
Nasalis-Muscle-Based Myocutaneous Island Pedicle Flap Text: Using a #15 blade, an incision was made around the donor flap to the level of the nasalis muscle. Wide lateral undermining was then performed in both the subcutaneous plane above the nasalis muscle, and in a submuscular plane just above periosteum. This allowed the formation of a free nasalis muscle axial pedicle (based on the angular artery) which was still attached to the actual cutaneous flap, increasing its mobility and vascular viability. Hemostasis was obtained with pinpoint electrocoagulation. The flap was mobilized into position and the pivotal anchor points positioned and stabilized with buried interrupted sutures. Subcutaneous and dermal tissues were closed in a multilayered fashion with sutures. Tissue redundancies were excised, and the epidermal edges were apposed without significant tension and sutured with sutures.
Orbicularis Oris Muscle Flap Text: The defect edges were debeveled with a #15 scalpel blade.  Given that the defect affected the competency of the oral sphincter an orbicularis oris muscle flap was deemed most appropriate to restore this competency and normal muscle function.  Using a sterile surgical marker, an appropriate flap was drawn incorporating the defect. The area thus outlined was incised with a #15 scalpel blade.
Melolabial Transposition Flap Text: The defect edges were debeveled with a #15 scalpel blade.  Given the location of the defect and the proximity to free margins a melolabial flap was deemed most appropriate.  Using a sterile surgical marker, an appropriate melolabial transposition flap was drawn incorporating the defect.    The area thus outlined was incised deep to adipose tissue with a #15 scalpel blade.  The skin margins were undermined to an appropriate distance in all directions utilizing iris scissors.
Rhombic Flap Text: The defect edges were debeveled with a #15 scalpel blade.  Given the location of the defect and the proximity to free margins a rhombic flap was deemed most appropriate.  Using a sterile surgical marker, an appropriate rhombic flap was drawn incorporating the defect.    The area thus outlined was incised deep to adipose tissue with a #15 scalpel blade.  The skin margins were undermined to an appropriate distance in all directions utilizing iris scissors.
Rhomboid Transposition Flap Text: The defect edges were debeveled with a #15 scalpel blade.  Given the location of the defect and the proximity to free margins a rhomboid transposition flap was deemed most appropriate.  Using a sterile surgical marker, an appropriate rhomboid flap was drawn incorporating the defect.    The area thus outlined was incised deep to adipose tissue with a #15 scalpel blade.  The skin margins were undermined to an appropriate distance in all directions utilizing iris scissors.
Bi-Rhombic Flap Text: The defect edges were debeveled with a #15 scalpel blade.  Given the location of the defect and the proximity to free margins a bi-rhombic flap was deemed most appropriate.  Using a sterile surgical marker, an appropriate rhombic flap was drawn incorporating the defect. The area thus outlined was incised deep to adipose tissue with a #15 scalpel blade.  The skin margins were undermined to an appropriate distance in all directions utilizing iris scissors.
Helical Rim Advancement Flap Text: The defect edges were debeveled with a #15 blade scalpel.  Given the location of the defect and the proximity to free margins (helical rim) a double helical rim advancement flap was deemed most appropriate.  Using a sterile surgical marker, the appropriate advancement flaps were drawn incorporating the defect and placing the expected incisions between the helical rim and antihelix where possible.  The area thus outlined was incised through and through with a #15 scalpel blade.  With a skin hook and iris scissors, the flaps were gently and sharply undermined and freed up.
Bilateral Helical Rim Advancement Flap Text: The defect edges were debeveled with a #15 blade scalpel.  Given the location of the defect and the proximity to free margins (helical rim) a bilateral helical rim advancement flap was deemed most appropriate.  Using a sterile surgical marker, the appropriate advancement flaps were drawn incorporating the defect and placing the expected incisions between the helical rim and antihelix where possible.  The area thus outlined was incised through and through with a #15 scalpel blade.  With a skin hook and iris scissors, the flaps were gently and sharply undermined and freed up.
Ear Star Wedge Flap Text: The defect edges were debeveled with a #15 blade scalpel.  Given the location of the defect and the proximity to free margins (helical rim) an ear star wedge flap was deemed most appropriate.  Using a sterile surgical marker, the appropriate flap was drawn incorporating the defect and placing the expected incisions between the helical rim and antihelix where possible.  The area thus outlined was incised through and through with a #15 scalpel blade.
Banner Transposition Flap Text: The defect edges were debeveled with a #15 scalpel blade.  Given the location of the defect and the proximity to free margins a Banner transposition flap was deemed most appropriate.  Using a sterile surgical marker, an appropriate flap drawn around the defect. The area thus outlined was incised deep to adipose tissue with a #15 scalpel blade.  The skin margins were undermined to an appropriate distance in all directions utilizing iris scissors.
Bilobed Flap Text: The defect edges were debeveled with a #15 scalpel blade.  Given the location of the defect and the proximity to free margins a bilobe flap was deemed most appropriate.  Using a sterile surgical marker, an appropriate bilobe flap drawn around the defect.    The area thus outlined was incised deep to adipose tissue with a #15 scalpel blade.  The skin margins were undermined to an appropriate distance in all directions utilizing iris scissors.
Bilobed Transposition Flap Text: The defect edges were debeveled with a #15 scalpel blade.  Given the location of the defect and the proximity to free margins a bilobed transposition flap was deemed most appropriate.  Using a sterile surgical marker, an appropriate bilobe flap drawn around the defect.    The area thus outlined was incised deep to adipose tissue with a #15 scalpel blade.  The skin margins were undermined to an appropriate distance in all directions utilizing iris scissors.
Trilobed Flap Text: The defect edges were debeveled with a #15 scalpel blade.  Given the location of the defect and the proximity to free margins a trilobed flap was deemed most appropriate.  Using a sterile surgical marker, an appropriate trilobed flap drawn around the defect.    The area thus outlined was incised deep to adipose tissue with a #15 scalpel blade.  The skin margins were undermined to an appropriate distance in all directions utilizing iris scissors.
Dorsal Nasal Flap Text: The defect edges were debeveled with a #15 scalpel blade.  Given the location of the defect and the proximity to free margins a dorsal nasal flap,based upon the glabellar folds, was deemed most appropriate.  Using a sterile surgical marker, an appropriate dorsal nasal flap was drawn around the defect.    The area thus outlined was incised deep to adipose tissue with a #15 scalpel blade.  The skin margins were undermined to an appropriate distance in all directions utilizing iris scissors.
Island Pedicle Flap Text: The defect edges were debeveled with a #15 scalpel blade.  Given the location of the defect, shape of the defect and the proximity to free margins an island pedicle advancement flap was deemed most appropriate.  Using a sterile surgical marker, an appropriate advancement flap was drawn incorporating the defect, outlining the appropriate donor tissue and placing the expected incisions within the relaxed skin tension lines where possible.    The area thus outlined was incised deep to adipose tissue with a #15 scalpel blade.  The skin margins were undermined to an appropriate distance in all directions around the primary defect and laterally outward around the island pedicle utilizing iris scissors.  There was minimal undermining beneath the pedicle flap.
Island Pedicle Flap With Canthal Suspension Text: The defect edges were debeveled with a #15 scalpel blade.  Given the location of the defect, shape of the defect and the proximity to free margins an island pedicle advancement flap was deemed most appropriate.  Using a sterile surgical marker, an appropriate advancement flap was drawn incorporating the defect, outlining the appropriate donor tissue and placing the expected incisions within the relaxed skin tension lines where possible. The area thus outlined was incised deep to adipose tissue with a #15 scalpel blade.  The skin margins were undermined to an appropriate distance in all directions around the primary defect and laterally outward around the island pedicle utilizing iris scissors.  There was minimal undermining beneath the pedicle flap. A suspension suture was placed in the canthal tendon to prevent tension and prevent ectropion.
Alar Island Pedicle Flap Text: The defect edges were debeveled with a #15 scalpel blade.  Given the location of the defect, shape of the defect and the proximity to the alar rim an island pedicle advancement flap was deemed most appropriate.  Using a sterile surgical marker, an appropriate advancement flap was drawn incorporating the defect, outlining the appropriate donor tissue and placing the expected incisions within the nasal ala running parallel to the alar rim. The area thus outlined was incised with a #15 scalpel blade.  The skin margins were undermined minimally to an appropriate distance in all directions around the primary defect and laterally outward around the island pedicle utilizing iris scissors.  There was minimal undermining beneath the pedicle flap.
Double Island Pedicle Flap Text: The defect edges were debeveled with a #15 scalpel blade.  Given the location of the defect, shape of the defect and the proximity to free margins a double island pedicle advancement flap was deemed most appropriate.  Using a sterile surgical marker, an appropriate advancement flap was drawn incorporating the defect, outlining the appropriate donor tissue and placing the expected incisions within the relaxed skin tension lines where possible.    The area thus outlined was incised deep to adipose tissue with a #15 scalpel blade.  The skin margins were undermined to an appropriate distance in all directions around the primary defect and laterally outward around the island pedicle utilizing iris scissors.  There was minimal undermining beneath the pedicle flap.
Island Pedicle Flap-Requiring Vessel Identification Text: The defect edges were debeveled with a #15 scalpel blade.  Given the location of the defect, shape of the defect and the proximity to free margins an island pedicle advancement flap was deemed most appropriate.  Using a sterile surgical marker, an appropriate advancement flap was drawn, based on the axial vessel mentioned above, incorporating the defect, outlining the appropriate donor tissue and placing the expected incisions within the relaxed skin tension lines where possible.    The area thus outlined was incised deep to adipose tissue with a #15 scalpel blade.  The skin margins were undermined to an appropriate distance in all directions around the primary defect and laterally outward around the island pedicle utilizing iris scissors.  There was minimal undermining beneath the pedicle flap.
Keystone Flap Text: The defect edges were debeveled with a #15 scalpel blade.  Given the location of the defect, shape of the defect a keystone flap was deemed most appropriate.  Using a sterile surgical marker, an appropriate keystone flap was drawn incorporating the defect, outlining the appropriate donor tissue and placing the expected incisions within the relaxed skin tension lines where possible. The area thus outlined was incised deep to adipose tissue with a #15 scalpel blade.  The skin margins were undermined to an appropriate distance in all directions around the primary defect and laterally outward around the flap utilizing iris scissors.
O-T Plasty Text: The defect edges were debeveled with a #15 scalpel blade.  Given the location of the defect, shape of the defect and the proximity to free margins an O-T plasty was deemed most appropriate.  Using a sterile surgical marker, an appropriate O-T plasty was drawn incorporating the defect and placing the expected incisions within the relaxed skin tension lines where possible.    The area thus outlined was incised deep to adipose tissue with a #15 scalpel blade.  The skin margins were undermined to an appropriate distance in all directions utilizing iris scissors.
O-Z Plasty Text: The defect edges were debeveled with a #15 scalpel blade.  Given the location of the defect, shape of the defect and the proximity to free margins an O-Z plasty (double transposition flap) was deemed most appropriate.  Using a sterile surgical marker, the appropriate transposition flaps were drawn incorporating the defect and placing the expected incisions within the relaxed skin tension lines where possible.    The area thus outlined was incised deep to adipose tissue with a #15 scalpel blade.  The skin margins were undermined to an appropriate distance in all directions utilizing iris scissors.  Hemostasis was achieved with electrocautery.  The flaps were then transposed into place, one clockwise and the other counterclockwise, and anchored with interrupted buried subcutaneous sutures.
Double O-Z Plasty Text: The defect edges were debeveled with a #15 scalpel blade.  Given the location of the defect, shape of the defect and the proximity to free margins a Double O-Z plasty (double transposition flap) was deemed most appropriate.  Using a sterile surgical marker, the appropriate transposition flaps were drawn incorporating the defect and placing the expected incisions within the relaxed skin tension lines where possible. The area thus outlined was incised deep to adipose tissue with a #15 scalpel blade.  The skin margins were undermined to an appropriate distance in all directions utilizing iris scissors.  Hemostasis was achieved with electrocautery.  The flaps were then transposed into place, one clockwise and the other counterclockwise, and anchored with interrupted buried subcutaneous sutures.
V-Y Plasty Text: The defect edges were debeveled with a #15 scalpel blade.  Given the location of the defect, shape of the defect and the proximity to free margins an V-Y advancement flap was deemed most appropriate.  Using a sterile surgical marker, an appropriate advancement flap was drawn incorporating the defect and placing the expected incisions within the relaxed skin tension lines where possible.    The area thus outlined was incised deep to adipose tissue with a #15 scalpel blade.  The skin margins were undermined to an appropriate distance in all directions utilizing iris scissors.
H Plasty Text: Given the location of the defect, shape of the defect and the proximity to free margins a H-plasty was deemed most appropriate for repair.  Using a sterile surgical marker, the appropriate advancement arms of the H-plasty were drawn incorporating the defect and placing the expected incisions within the relaxed skin tension lines where possible. The area thus outlined was incised deep to adipose tissue with a #15 scalpel blade. The skin margins were undermined to an appropriate distance in all directions utilizing iris scissors.  The opposing advancement arms were then advanced into place in opposite direction and anchored with interrupted buried subcutaneous sutures.
W Plasty Text: The lesion was extirpated to the level of the fat with a #15 scalpel blade.  Given the location of the defect, shape of the defect and the proximity to free margins a W-plasty was deemed most appropriate for repair.  Using a sterile surgical marker, the appropriate transposition arms of the W-plasty were drawn incorporating the defect and placing the expected incisions within the relaxed skin tension lines where possible.    The area thus outlined was incised deep to adipose tissue with a #15 scalpel blade.  The skin margins were undermined to an appropriate distance in all directions utilizing iris scissors.  The opposing transposition arms were then transposed into place in opposite direction and anchored with interrupted buried subcutaneous sutures.
Z Plasty Text: The lesion was extirpated to the level of the fat with a #15 scalpel blade.  Given the location of the defect, shape of the defect and the proximity to free margins a Z-plasty was deemed most appropriate for repair.  Using a sterile surgical marker, the appropriate transposition arms of the Z-plasty were drawn incorporating the defect and placing the expected incisions within the relaxed skin tension lines where possible.    The area thus outlined was incised deep to adipose tissue with a #15 scalpel blade.  The skin margins were undermined to an appropriate distance in all directions utilizing iris scissors.  The opposing transposition arms were then transposed into place in opposite direction and anchored with interrupted buried subcutaneous sutures.
Zygomaticofacial Flap Text: Given the location of the defect, shape of the defect and the proximity to free margins a zygomaticofacial flap was deemed most appropriate for repair.  Using a sterile surgical marker, the appropriate flap was drawn incorporating the defect and placing the expected incisions within the relaxed skin tension lines where possible. The area thus outlined was incised deep to adipose tissue with a #15 scalpel blade with preservation of a vascular pedicle.  The skin margins were undermined to an appropriate distance in all directions utilizing iris scissors.  The flap was then placed into the defect and anchored with interrupted buried subcutaneous sutures.
Cheek Interpolation Flap Text: A decision was made to reconstruct the defect utilizing an interpolation axial flap and a staged reconstruction.  A telfa template was made of the defect.  This telfa template was then used to outline the Cheek Interpolation flap.  The donor area for the pedicle flap was then injected with anesthesia.  The flap was excised through the skin and subcutaneous tissue down to the layer of the underlying musculature.  The interpolation flap was carefully excised within this deep plane to maintain its blood supply.  The edges of the donor site were undermined.   The donor site was closed in a primary fashion.  The pedicle was then rotated into position and sutured.  Once the tube was sutured into place, adequate blood supply was confirmed with blanching and refill.  The pedicle was then wrapped with xeroform gauze and dressed appropriately with a telfa and gauze bandage to ensure continued blood supply and protect the attached pedicle.
Cheek-To-Nose Interpolation Flap Text: A decision was made to reconstruct the defect utilizing an interpolation axial flap and a staged reconstruction.  A telfa template was made of the defect.  This telfa template was then used to outline the Cheek-To-Nose Interpolation flap.  The donor area for the pedicle flap was then injected with anesthesia.  The flap was excised through the skin and subcutaneous tissue down to the layer of the underlying musculature.  The interpolation flap was carefully excised within this deep plane to maintain its blood supply.  The edges of the donor site were undermined.   The donor site was closed in a primary fashion.  The pedicle was then rotated into position and sutured.  Once the tube was sutured into place, adequate blood supply was confirmed with blanching and refill.  The pedicle was then wrapped with xeroform gauze and dressed appropriately with a telfa and gauze bandage to ensure continued blood supply and protect the attached pedicle.
Interpolation Flap Text: A decision was made to reconstruct the defect utilizing an interpolation axial flap and a staged reconstruction.  A telfa template was made of the defect.  This telfa template was then used to outline the interpolation flap.  The donor area for the pedicle flap was then injected with anesthesia.  The flap was excised through the skin and subcutaneous tissue down to the layer of the underlying musculature.  The interpolation flap was carefully excised within this deep plane to maintain its blood supply.  The edges of the donor site were undermined.   The donor site was closed in a primary fashion.  The pedicle was then rotated into position and sutured.  Once the tube was sutured into place, adequate blood supply was confirmed with blanching and refill.  The pedicle was then wrapped with xeroform gauze and dressed appropriately with a telfa and gauze bandage to ensure continued blood supply and protect the attached pedicle.
Melolabial Interpolation Flap Text: A decision was made to reconstruct the defect utilizing an interpolation axial flap and a staged reconstruction.  A telfa template was made of the defect.  This telfa template was then used to outline the melolabial interpolation flap.  The donor area for the pedicle flap was then injected with anesthesia.  The flap was excised through the skin and subcutaneous tissue down to the layer of the underlying musculature.  The pedicle flap was carefully excised within this deep plane to maintain its blood supply.  The edges of the donor site were undermined.   The donor site was closed in a primary fashion.  The pedicle was then rotated into position and sutured.  Once the tube was sutured into place, adequate blood supply was confirmed with blanching and refill.  The pedicle was then wrapped with xeroform gauze and dressed appropriately with a telfa and gauze bandage to ensure continued blood supply and protect the attached pedicle.
Mastoid Interpolation Flap Text: A decision was made to reconstruct the defect utilizing an interpolation axial flap and a staged reconstruction.  A telfa template was made of the defect.  This telfa template was then used to outline the mastoid interpolation flap.  The donor area for the pedicle flap was then injected with anesthesia.  The flap was excised through the skin and subcutaneous tissue down to the layer of the underlying musculature.  The pedicle flap was carefully excised within this deep plane to maintain its blood supply.  The edges of the donor site were undermined.   The donor site was closed in a primary fashion.  The pedicle was then rotated into position and sutured.  Once the tube was sutured into place, adequate blood supply was confirmed with blanching and refill.  The pedicle was then wrapped with xeroform gauze and dressed appropriately with a telfa and gauze bandage to ensure continued blood supply and protect the attached pedicle.
Posterior Auricular Interpolation Flap Text: A decision was made to reconstruct the defect utilizing an interpolation axial flap and a staged reconstruction.  A telfa template was made of the defect.  This telfa template was then used to outline the posterior auricular interpolation flap.  The donor area for the pedicle flap was then injected with anesthesia.  The flap was excised through the skin and subcutaneous tissue down to the layer of the underlying musculature.  The pedicle flap was carefully excised within this deep plane to maintain its blood supply.  The edges of the donor site were undermined.   The donor site was closed in a primary fashion.  The pedicle was then rotated into position and sutured.  Once the tube was sutured into place, adequate blood supply was confirmed with blanching and refill.  The pedicle was then wrapped with xeroform gauze and dressed appropriately with a telfa and gauze bandage to ensure continued blood supply and protect the attached pedicle.
Paramedian Forehead Flap Text: A decision was made to reconstruct the defect utilizing an interpolation axial flap and a staged reconstruction.  A telfa template was made of the defect.  This telfa template was then used to outline the paramedian forehead pedicle flap.  The donor area for the pedicle flap was then injected with anesthesia.  The flap was excised through the skin and subcutaneous tissue down to the layer of the underlying musculature.  The pedicle flap was carefully excised within this deep plane to maintain its blood supply.  The edges of the donor site were undermined.   The donor site was closed in a primary fashion.  The pedicle was then rotated into position and sutured.  Once the tube was sutured into place, adequate blood supply was confirmed with blanching and refill.  The pedicle was then wrapped with xeroform gauze and dressed appropriately with a telfa and gauze bandage to ensure continued blood supply and protect the attached pedicle.
Cheiloplasty (Less Than 50%) Text: A decision was made to reconstruct the defect with a  cheiloplasty.  The defect was undermined extensively.  Additional obicularis oris muscle was excised with a 15 blade scalpel.  The defect was converted into a full thickness wedge, of less than 50% of the vertical height of the lip, to facilite a better cosmetic result.  Small vessels were then tied off with 5-0 monocyrl. The obicularis oris, superficial fascia, adipose and dermis were then reapproximated.  After the deeper layers were approximated the epidermis was reapproximated with particular care given to realign the vermilion border.
Cheiloplasty (Complex) Text: A decision was made to reconstruct the defect with a  cheiloplasty.  The defect was undermined extensively.  Additional obicularis oris muscle was excised with a 15 blade scalpel.  The defect was converted into a full thickness wedge to facilite a better cosmetic result.  Small vessels were then tied off with 5-0 monocyrl. The obicularis oris, superficial fascia, adipose and dermis were then reapproximated.  After the deeper layers were approximated the epidermis was reapproximated with particular care given to realign the vermilion border.
Ear Wedge Repair Text: A wedge excision was completed by carrying down an excision through the full thickness of the ear and cartilage with an inward facing Burow's triangle. The wound was then closed in a layered fashion.
Full Thickness Lip Wedge Repair (Flap) Text: Given the location of the defect and the proximity to free margins a full thickness wedge repair was deemed most appropriate.  Using a sterile surgical marker, the appropriate repair was drawn incorporating the defect and placing the expected incisions perpendicular to the vermilion border.  The vermilion border was also meticulously outlined to ensure appropriate reapproximation during the repair.  The area thus outlined was incised through and through with a #15 scalpel blade.  The muscularis and dermis were reaproximated with deep sutures following hemostasis. Care was taken to realign the vermilion border before proceeding with the superficial closure.  Once the vermilion was realigned the superfical and mucosal closure was finished.
Ftsg Text: The defect edges were debeveled with a #15 scalpel blade.  Given the location of the defect, shape of the defect and the proximity to free margins a full thickness skin graft was deemed most appropriate.  Using a sterile surgical marker, the primary defect shape was transferred to the donor site. The area thus outlined was incised deep to adipose tissue with a #15 scalpel blade.  The harvested graft was then trimmed of adipose tissue until only dermis and epidermis was left.  The skin margins of the secondary defect were undermined to an appropriate distance in all directions utilizing iris scissors.  The secondary defect was closed with interrupted buried subcutaneous sutures.  The skin edges were then re-apposed with running  sutures.  The skin graft was then placed in the primary defect and oriented appropriately.
Split-Thickness Skin Graft Text: The defect edges were debeveled with a #15 scalpel blade.  Given the location of the defect, shape of the defect and the proximity to free margins a split thickness skin graft was deemed most appropriate.  Using a sterile surgical marker, the primary defect shape was transferred to the donor site. The split thickness graft was then harvested.  The skin graft was then placed in the primary defect and oriented appropriately.
Burow's Graft Text: The defect edges were debeveled with a #15 scalpel blade.  Given the location of the defect, shape of the defect, the proximity to free margins and the presence of a standing cone deformity a Burow's skin graft was deemed most appropriate. The standing cone was removed and this tissue was then trimmed to the shape of the primary defect. The adipose tissue was also removed until only dermis and epidermis were left.  The skin margins of the secondary defect were undermined to an appropriate distance in all directions utilizing iris scissors.  The secondary defect was closed with interrupted buried subcutaneous sutures.  The skin edges were then re-apposed with running  sutures.  The skin graft was then placed in the primary defect and oriented appropriately.
Cartilage Graft Text: The defect edges were debeveled with a #15 scalpel blade.  Given the location of the defect, shape of the defect, the fact the defect involved a full thickness cartilage defect a cartilage graft was deemed most appropriate.  An appropriate donor site was identified, cleansed, and anesthetized. The cartilage graft was then harvested and transferred to the recipient site, oriented appropriately and then sutured into place.  The secondary defect was then repaired using a primary closure.
Composite Graft Text: The defect edges were debeveled with a #15 scalpel blade.  Given the location of the defect, shape of the defect, the proximity to free margins and the fact the defect was full thickness a composite graft was deemed most appropriate.  The defect was outline and then transferred to the donor site.  A full thickness graft was then excised from the donor site. The graft was then placed in the primary defect, oriented appropriately and then sutured into place.  The secondary defect was then repaired using a primary closure.
Epidermal Autograft Text: The defect edges were debeveled with a #15 scalpel blade.  Given the location of the defect, shape of the defect and the proximity to free margins an epidermal autograft was deemed most appropriate.  Using a sterile surgical marker, the primary defect shape was transferred to the donor site. The epidermal graft was then harvested.  The skin graft was then placed in the primary defect and oriented appropriately.
Dermal Autograft Text: The defect edges were debeveled with a #15 scalpel blade.  Given the location of the defect, shape of the defect and the proximity to free margins a dermal autograft was deemed most appropriate.  Using a sterile surgical marker, the primary defect shape was transferred to the donor site. The area thus outlined was incised deep to adipose tissue with a #15 scalpel blade.  The harvested graft was then trimmed of adipose and epidermal tissue until only dermis was left.  The skin graft was then placed in the primary defect and oriented appropriately.
Skin Substitute Text: The defect edges were debeveled with a #15 scalpel blade.  Given the location of the defect, shape of the defect and the proximity to free margins a skin substitute graft was deemed most appropriate.  The graft material was trimmed to fit the size of the defect. The graft was then placed in the primary defect and oriented appropriately.
Tissue Cultured Epidermal Autograft Text: The defect edges were debeveled with a #15 scalpel blade.  Given the location of the defect, shape of the defect and the proximity to free margins a tissue cultured epidermal autograft was deemed most appropriate.  The graft was then trimmed to fit the size of the defect.  The graft was then placed in the primary defect and oriented appropriately.
Xenograft Text: The defect edges were debeveled with a #15 scalpel blade.  Given the location of the defect, shape of the defect and the proximity to free margins a xenograft was deemed most appropriate.  The graft was then trimmed to fit the size of the defect.  The graft was then placed in the primary defect and oriented appropriately.
Purse String (Simple) Text: Given the location of the defect and the characteristics of the surrounding skin a purse string closure was deemed most appropriate.  Undermining was performed circumfirentially around the surgical defect.  A purse string suture was then placed and tightened.
Purse String (Intermediate) Text: Given the location of the defect and the characteristics of the surrounding skin a purse string intermediate closure was deemed most appropriate.  Undermining was performed circumfirentially around the surgical defect.  A purse string suture was then placed and tightened.
Partial Purse String (Simple) Text: Given the location of the defect and the characteristics of the surrounding skin a simple purse string closure was deemed most appropriate.  Undermining was performed circumfirentially around the surgical defect.  A purse string suture was then placed and tightened. Wound tension only allowed a partial closure of the circular defect.
Partial Purse String (Intermediate) Text: Given the location of the defect and the characteristics of the surrounding skin an intermediate purse string closure was deemed most appropriate.  Undermining was performed circumfirentially around the surgical defect.  A purse string suture was then placed and tightened. Wound tension only allowed a partial closure of the circular defect.
Localized Dermabrasion With Wire Brush Text: The patient was draped in routine manner.  Localized dermabrasion using 3 x 17 mm wire brush was performed in routine manner to papillary dermis. This spot dermabrasion is being performed to complete skin cancer reconstruction. It also will eliminate the other sun damaged precancerous cells that are known to be part of the regional effect of a lifetime's worth of sun exposure. This localized dermabrasion is therapeutic and should not be considered cosmetic in any regard.
Tarsorrhaphy Text: A tarsorrhaphy was performed using Frost sutures.
Complex Repair And Flap Additional Text (Will Appearing After The Standard Complex Repair Text): The complex repair was not sufficient to completely close the primary defect. The remaining additional defect was repaired with the flap mentioned below.
Complex Repair And Graft Additional Text (Will Appearing After The Standard Complex Repair Text): The complex repair was not sufficient to completely close the primary defect. The remaining additional defect was repaired with the graft mentioned below.
Unique Flap 1 Name: Myocutaneous Island pedicle Flap
Unique Flap 2 Name: Peng Flap
Unique Flap 3 Name: Mercedes Flap
Unique Flap 4 Name: Banner Flap
Unique Flap 5 Name: tunneled myocutaneous flap
Unique Flap 6 Name: Kacey-B?ch flap
Unique Flap 7 Name: Mustarde flap
Unique Flap 8 Name: East to West Flap
Unique Flap 1 Text: A decision was made to reconstruct the defect utilizing a myocutaneous Island pedicle Flap based on the levator labii superioris muscle.  A telfa template was made of the defect.  This telfa template was then used to outline the myocutaneous flap, based along the meilolabial fold.  The donor area for the pedicle flap was then injected with anesthesia.  The flap was excised through the skin and subcutaneous tissue down to the layer of the underlying musculature.  The myocutaneous flap was carefully excised within this deep plane to maintain its blood supply. Based on the muscle. The edges of the donor site were undermined.   The donor site was closed in a primary fashion to the point of transposition.  The pedicle was then transposed into position and sutured.  Once the flap was sutured into place, adequate blood supply was confirmed with blanching and refill.
Unique Flap 2 Text: A decision was made to reconstruct the defect utilizing a Peng Flap (Bilateral Advancement Rotation Flap). Given the location of the defect and the proximity to free margins, this flap was deemed most appropriate.  Using a sterile surgical marker, the appropriate rotation flaps were drawn incorporating the defect and placing the expected incisions within the relaxed skin tension lines where possible.    The area thus outlined was incised deep to adipose tissue with a #15 scalpel blade.  The skin margins were undermined to an appropriate distance in all directions utilizing iris scissors.
Unique Flap 3 Text: The defect edges were debeveled with a #15 scalpel blade.  Given the location of the defect, shape of the defect and the proximity to free margins a Mercedes (double advancement flap) was deemed most appropriate.  Using a sterile surgical marker, the appropriate transposition flaps were drawn incorporating the defect and placing the expected incisions within the relaxed skin tension lines where possible.    The area thus outlined was incised deep to adipose tissue with a #15 scalpel blade.  The skin margins were undermined to an appropriate distance in all directions utilizing iris scissors.  Hemostasis was achieved with electrocautery.  The flaps were then advanced into the defect and anchored with interrupted buried subcutaneous sutures.
Unique Flap 4 Text: The defect edges were debeveled with a #15 scalpel blade.  Given the location of the defect and the proximity to free margins a Banner transposition flap was deemed most appropriate.  Using a sterile surgical marker, an appropriate Banner transposition flap was drawn incorporating the defect.    The area thus outlined was incised deep to adipose tissue with a #15 scalpel blade.  The skin margins were undermined to an appropriate distance in all directions utilizing iris scissors.
Unique Flap 5 Text: A decision was made to reconstruct the defect utilizing a tunneled myocutaneous Island pedicle Flap based on the anterior auricularis muscle.  A telfa template was made of the defect.  This telfa template was then used to outline the myocutaneous flap, based along the preauricular fold.  The donor area for the pedicle flap was then injected with anesthesia.  The flap was excised through the skin and subcutaneous tissue down to the layer of the underlying musculature.  The myocutaneous flap was carefully excised within this deep plane to maintain its blood supply based on the muscle. The edges of the donor site were undermined.   The donor site was closed in a primary fashion to the point of transposition.  The pedicle was then transposed through a tunnel into position and sutured.  Once the flap was sutured into place, adequate blood supply was confirmed with blanching and refill.
Unique Flap 6 Text: A decision was made to reconstruct the defect utilizing an Anti-aging-B?ch Flap (Bilateral helical Advancement Rotation Flap). Given the location of the defect and the proximity to free margins, this flap was deemed most appropriate.  Using a sterile surgical marker, the appropriate flaps were drawn incorporating the defect and placing the expected incisions within the relaxed skin tension lines where possible.  The area thus outlined was incised deep to adipose tissue with a #15 scalpel blade.  The skin margins were undermined to an appropriate distance in all directions utilizing iris scissors. Cartilage was incorporated into the flap arms to maintain helical anatomy.
Unique Flap 7 Text: A decision was made to reconstruct the defect utilizing a Mustarde Flap (Advancement Rotation Flap). Given the location of the defect and the proximity to free margins, this flap was deemed most appropriate.  Using a sterile surgical marker, the appropriate rotation flap was drawn incorporating the defect and placing the expected incisions within the relaxed skin tension lines where possible.    The area thus outlined was incised deep to adipose tissue with a #15 scalpel blade.  The skin margins were undermined to an appropriate distance in all directions utilizing iris scissors. The flap was advanced and rotated under the eyelid with a sling created laterally to keep ectropion minimal.
Unique Flap 8 Text: A decision was made to reconstruct the defect utilizing an East to West Flap (Modified Burows Advancement Flap). Given the location of the defect and the proximity to free margins, this flap was deemed most appropriate.  Using a sterile surgical marker, the appropriate advancement flaps were drawn incorporating the defect and placing the expected incisions within the relaxed skin tension lines where possible.    The area thus outlined was incised deep to adipose tissue with a #15 scalpel blade.  The skin margins were undermined to an appropriate distance in all directions utilizing iris scissors. Minimal alar distortion was created with flap approximation.
Manual Repair Warning Statement: We plan on removing the manually selected variable below in favor of our much easier automatic structured text blocks found in the previous tab. We decided to do this to help make the flow better and give you the full power of structured data. Manual selection is never going to be ideal in our platform and I would encourage you to avoid using manual selection from this point on, especially since I will be sunsetting this feature. It is important that you do one of two things with the customized text below. First, you can save all of the text in a word file so you can have it for future reference. Second, transfer the text to the appropriate area in the Library tab. Lastly, if there is a flap or graft type which we do not have you need to let us know right away so I can add it in before the variable is hidden. No need to panic, we plan to give you roughly 6 months to make the change.
Same Histology In Subsequent Stages Text: The pattern and morphology of the tumor is as described in the first stage.
No Residual Tumor Seen Histology Text: There were no malignant cells seen in the sections examined.
Inflammation Suggestive Of Cancer Camouflage Histology Text: There was a dense lymphocytic infiltrate which prevented adequate histologic evaluation of adjacent structures.
Bcc Histology Text: There were numerous aggregates of basaloid cells.
Bcc Infiltrative Histology Text: There were numerous aggregates of basaloid cells demonstrating an infiltrative pattern.
Mart-1 - Positive Histology Text: MART-1 staining demonstrates areas of higher density and clustering of melanocytes with Pagetoid spread upwards within the epidermis. The surgical margins are positive for tumor cells.
Mart-1 - Negative Histology Text: MART-1 staining demonstrates a normal density and pattern of melanocytes along the dermal-epidermal junction. The surgical margins are negative for tumor cells.
Information: Selecting Yes will display possible errors in your note based on the variables you have selected. This validation is only offered as a suggestion for you. PLEASE NOTE THAT THE VALIDATION TEXT WILL BE REMOVED WHEN YOU FINALIZE YOUR NOTE. IF YOU WANT TO FAX A PRELIMINARY NOTE YOU WILL NEED TO TOGGLE THIS TO 'NO' IF YOU DO NOT WANT IT IN YOUR FAXED NOTE.

## 2021-06-15 NOTE — TELEPHONE ENCOUNTER
Order entered as requested.    Patient is scheduled on 1-20-21 for a PM Gen change with Dr. Maldonado. No meds to stop and patient to check in at 6:00AM for a 7:30 procedure. Pre admit appt is on 1-18-21 at 1:15. Patient was given all instructions and maps in office due to patient being very hard of hearing and can't hear on the phone.

## 2021-08-17 ENCOUNTER — APPOINTMENT (RX ONLY)
Dept: URBAN - METROPOLITAN AREA CLINIC 36 | Facility: CLINIC | Age: 81
Setting detail: DERMATOLOGY
End: 2021-08-17

## 2021-08-17 DIAGNOSIS — Z48.817 ENCOUNTER FOR SURGICAL AFTERCARE FOLLOWING SURGERY ON THE SKIN AND SUBCUTANEOUS TISSUE: ICD-10-CM

## 2021-08-17 PROCEDURE — ? POST-OP WOUND CHECK

## 2021-08-17 ASSESSMENT — LOCATION DETAILED DESCRIPTION DERM: LOCATION DETAILED: RIGHT ANTERIOR EARLOBE

## 2021-08-17 ASSESSMENT — LOCATION SIMPLE DESCRIPTION DERM: LOCATION SIMPLE: RIGHT EAR

## 2021-08-17 ASSESSMENT — LOCATION ZONE DERM: LOCATION ZONE: EAR

## 2021-08-17 NOTE — PROCEDURE: POST-OP WOUND CHECK
Detail Level: Detailed
Add 29967 Cpt? (Important Note: In 2017 The Use Of 03948 Is Being Tracked By Cms To Determine Future Global Period Reimbursement For Global Periods): no

## 2021-10-01 ENCOUNTER — NON-PROVIDER VISIT (OUTPATIENT)
Dept: CARDIOLOGY | Facility: MEDICAL CENTER | Age: 81
End: 2021-10-01
Payer: MEDICARE

## 2021-10-01 DIAGNOSIS — Z95.0 CARDIAC PACEMAKER IN SITU: ICD-10-CM

## 2021-10-01 DIAGNOSIS — I49.5 SICK SINUS SYNDROME (HCC): ICD-10-CM

## 2021-10-01 PROCEDURE — 93280 PM DEVICE PROGR EVAL DUAL: CPT | Performed by: INTERNAL MEDICINE

## 2022-04-05 ENCOUNTER — NON-PROVIDER VISIT (OUTPATIENT)
Dept: CARDIOLOGY | Facility: MEDICAL CENTER | Age: 82
End: 2022-04-05

## 2022-04-05 DIAGNOSIS — I49.5 SICK SINUS SYNDROME (HCC): ICD-10-CM

## 2022-04-05 DIAGNOSIS — Z95.0 CARDIAC PACEMAKER IN SITU: ICD-10-CM

## 2022-04-05 PROCEDURE — 93280 PM DEVICE PROGR EVAL DUAL: CPT | Performed by: INTERNAL MEDICINE

## 2022-05-11 ENCOUNTER — APPOINTMENT (RX ONLY)
Dept: URBAN - METROPOLITAN AREA CLINIC 20 | Facility: CLINIC | Age: 82
Setting detail: DERMATOLOGY
End: 2022-05-11

## 2022-05-11 DIAGNOSIS — D22 MELANOCYTIC NEVI: ICD-10-CM

## 2022-05-11 DIAGNOSIS — L82.1 OTHER SEBORRHEIC KERATOSIS: ICD-10-CM

## 2022-05-11 DIAGNOSIS — L82.0 INFLAMED SEBORRHEIC KERATOSIS: ICD-10-CM

## 2022-05-11 DIAGNOSIS — Z85.828 PERSONAL HISTORY OF OTHER MALIGNANT NEOPLASM OF SKIN: ICD-10-CM

## 2022-05-11 DIAGNOSIS — D18.0 HEMANGIOMA: ICD-10-CM

## 2022-05-11 DIAGNOSIS — L81.4 OTHER MELANIN HYPERPIGMENTATION: ICD-10-CM

## 2022-05-11 DIAGNOSIS — Z71.89 OTHER SPECIFIED COUNSELING: ICD-10-CM

## 2022-05-11 PROBLEM — D22.62 MELANOCYTIC NEVI OF LEFT UPPER LIMB, INCLUDING SHOULDER: Status: ACTIVE | Noted: 2022-05-11

## 2022-05-11 PROBLEM — D18.01 HEMANGIOMA OF SKIN AND SUBCUTANEOUS TISSUE: Status: ACTIVE | Noted: 2022-05-11

## 2022-05-11 PROBLEM — D22.5 MELANOCYTIC NEVI OF TRUNK: Status: ACTIVE | Noted: 2022-05-11

## 2022-05-11 PROBLEM — D22.61 MELANOCYTIC NEVI OF RIGHT UPPER LIMB, INCLUDING SHOULDER: Status: ACTIVE | Noted: 2022-05-11

## 2022-05-11 PROCEDURE — ? COUNSELING

## 2022-05-11 PROCEDURE — ? SUNSCREEN RECOMMENDATIONS

## 2022-05-11 PROCEDURE — 99213 OFFICE O/P EST LOW 20 MIN: CPT

## 2022-05-11 ASSESSMENT — LOCATION DETAILED DESCRIPTION DERM
LOCATION DETAILED: SUPERIOR THORACIC SPINE
LOCATION DETAILED: INFERIOR THORACIC SPINE
LOCATION DETAILED: LEFT MEDIAL MALAR CHEEK
LOCATION DETAILED: RIGHT VENTRAL DISTAL FOREARM
LOCATION DETAILED: RIGHT MEDIAL MALAR CHEEK
LOCATION DETAILED: RIGHT RADIAL DORSAL HAND
LOCATION DETAILED: LEFT VENTRAL PROXIMAL FOREARM
LOCATION DETAILED: RIGHT MEDIAL UPPER BACK
LOCATION DETAILED: LEFT PROXIMAL DORSAL FOREARM
LOCATION DETAILED: RIGHT PROXIMAL DORSAL FOREARM
LOCATION DETAILED: RIGHT INFERIOR MEDIAL FOREHEAD
LOCATION DETAILED: LEFT RADIAL DORSAL HAND
LOCATION DETAILED: RIGHT INFERIOR UPPER BACK
LOCATION DETAILED: RIGHT ANTERIOR EARLOBE

## 2022-05-11 ASSESSMENT — LOCATION SIMPLE DESCRIPTION DERM
LOCATION SIMPLE: RIGHT HAND
LOCATION SIMPLE: RIGHT FOREARM
LOCATION SIMPLE: RIGHT UPPER BACK
LOCATION SIMPLE: LEFT CHEEK
LOCATION SIMPLE: UPPER BACK
LOCATION SIMPLE: LEFT FOREARM
LOCATION SIMPLE: RIGHT FOREHEAD
LOCATION SIMPLE: RIGHT CHEEK
LOCATION SIMPLE: LEFT HAND
LOCATION SIMPLE: RIGHT EAR

## 2022-05-11 ASSESSMENT — LOCATION ZONE DERM
LOCATION ZONE: HAND
LOCATION ZONE: TRUNK
LOCATION ZONE: EAR
LOCATION ZONE: ARM
LOCATION ZONE: FACE

## 2022-11-10 ENCOUNTER — PATIENT MESSAGE (OUTPATIENT)
Dept: HEALTH INFORMATION MANAGEMENT | Facility: OTHER | Age: 82
End: 2022-11-10

## 2022-12-06 ENCOUNTER — APPOINTMENT (OUTPATIENT)
Dept: CARDIOLOGY | Facility: MEDICAL CENTER | Age: 82
End: 2022-12-06
Payer: MEDICARE

## 2023-02-23 ENCOUNTER — APPOINTMENT (RX ONLY)
Dept: URBAN - METROPOLITAN AREA CLINIC 20 | Facility: CLINIC | Age: 83
Setting detail: DERMATOLOGY
End: 2023-02-23

## 2023-02-23 DIAGNOSIS — L57.0 ACTINIC KERATOSIS: ICD-10-CM

## 2023-02-23 DIAGNOSIS — L81.4 OTHER MELANIN HYPERPIGMENTATION: ICD-10-CM

## 2023-02-23 DIAGNOSIS — L30.0 NUMMULAR DERMATITIS: ICD-10-CM

## 2023-02-23 PROCEDURE — ? OBSERVATION AND MEASURE

## 2023-02-23 PROCEDURE — ? LIQUID NITROGEN

## 2023-02-23 PROCEDURE — 17000 DESTRUCT PREMALG LESION: CPT

## 2023-02-23 PROCEDURE — ? COUNSELING

## 2023-02-23 PROCEDURE — ? PHOTO-DOCUMENTATION

## 2023-02-23 PROCEDURE — 17003 DESTRUCT PREMALG LES 2-14: CPT

## 2023-02-23 PROCEDURE — 99213 OFFICE O/P EST LOW 20 MIN: CPT | Mod: 25

## 2023-02-23 PROCEDURE — ? ADDITIONAL NOTES

## 2023-02-23 ASSESSMENT — LOCATION ZONE DERM
LOCATION ZONE: FINGER
LOCATION ZONE: ARM
LOCATION ZONE: HAND
LOCATION ZONE: LEG

## 2023-02-23 ASSESSMENT — LOCATION DETAILED DESCRIPTION DERM
LOCATION DETAILED: RIGHT ANTERIOR PROXIMAL UPPER ARM
LOCATION DETAILED: RIGHT KNEE
LOCATION DETAILED: LEFT RADIAL DORSAL HAND
LOCATION DETAILED: RIGHT RADIAL DORSAL HAND
LOCATION DETAILED: RIGHT VENTRAL PROXIMAL FOREARM
LOCATION DETAILED: LEFT ULNAR DORSAL HAND
LOCATION DETAILED: 1ST WEB SPACE RIGHT HAND

## 2023-02-23 ASSESSMENT — LOCATION SIMPLE DESCRIPTION DERM
LOCATION SIMPLE: RIGHT HAND
LOCATION SIMPLE: RIGHT KNEE
LOCATION SIMPLE: RIGHT THUMB
LOCATION SIMPLE: RIGHT UPPER ARM
LOCATION SIMPLE: LEFT HAND
LOCATION SIMPLE: RIGHT FOREARM

## 2023-02-23 NOTE — PROCEDURE: ADDITIONAL NOTES
Additional Notes: Instructed pt to use OTC HC 2 x day 10 days, as well as CeraVe cream
Detail Level: Simple
Render Risk Assessment In Note?: no

## 2023-02-23 NOTE — PROCEDURE: MIPS QUALITY
Quality 130: Documentation Of Current Medications In The Medical Record: Current Medications Documented
Quality 226: Preventive Care And Screening: Tobacco Use: Screening And Cessation Intervention: Patient screened for tobacco use and is an ex/non-smoker
Quality 431: Preventive Care And Screening: Unhealthy Alcohol Use - Screening: Patient screened for unhealthy alcohol use using a single question and scores less than 2 times per year
Quality 402: Tobacco Use And Help With Quitting Among Adolescents: Patient screened for tobacco and never smoked
Quality 111:Pneumonia Vaccination Status For Older Adults: Pneumococcal Vaccination Previously Received
Detail Level: Detailed

## 2023-05-15 ENCOUNTER — APPOINTMENT (RX ONLY)
Dept: URBAN - METROPOLITAN AREA CLINIC 20 | Facility: CLINIC | Age: 83
Setting detail: DERMATOLOGY
End: 2023-05-15

## 2023-05-15 DIAGNOSIS — L82.0 INFLAMED SEBORRHEIC KERATOSIS: ICD-10-CM

## 2023-05-15 DIAGNOSIS — D22 MELANOCYTIC NEVI: ICD-10-CM

## 2023-05-15 DIAGNOSIS — D18.0 HEMANGIOMA: ICD-10-CM

## 2023-05-15 DIAGNOSIS — L81.4 OTHER MELANIN HYPERPIGMENTATION: ICD-10-CM

## 2023-05-15 DIAGNOSIS — Z71.89 OTHER SPECIFIED COUNSELING: ICD-10-CM

## 2023-05-15 DIAGNOSIS — L82.1 OTHER SEBORRHEIC KERATOSIS: ICD-10-CM

## 2023-05-15 DIAGNOSIS — Z85.828 PERSONAL HISTORY OF OTHER MALIGNANT NEOPLASM OF SKIN: ICD-10-CM

## 2023-05-15 PROBLEM — D18.01 HEMANGIOMA OF SKIN AND SUBCUTANEOUS TISSUE: Status: ACTIVE | Noted: 2023-05-15

## 2023-05-15 PROBLEM — D22.5 MELANOCYTIC NEVI OF TRUNK: Status: ACTIVE | Noted: 2023-05-15

## 2023-05-15 PROBLEM — D22.62 MELANOCYTIC NEVI OF LEFT UPPER LIMB, INCLUDING SHOULDER: Status: ACTIVE | Noted: 2023-05-15

## 2023-05-15 PROBLEM — D22.61 MELANOCYTIC NEVI OF RIGHT UPPER LIMB, INCLUDING SHOULDER: Status: ACTIVE | Noted: 2023-05-15

## 2023-05-15 PROCEDURE — ? COUNSELING

## 2023-05-15 PROCEDURE — ? SUNSCREEN RECOMMENDATIONS

## 2023-05-15 PROCEDURE — 99213 OFFICE O/P EST LOW 20 MIN: CPT

## 2023-05-15 ASSESSMENT — LOCATION DETAILED DESCRIPTION DERM
LOCATION DETAILED: SUPERIOR THORACIC SPINE
LOCATION DETAILED: LEFT PROXIMAL DORSAL FOREARM
LOCATION DETAILED: RIGHT CENTRAL ZYGOMA
LOCATION DETAILED: LEFT VENTRAL PROXIMAL FOREARM
LOCATION DETAILED: RIGHT PROXIMAL DORSAL FOREARM
LOCATION DETAILED: LEFT SUPERIOR MEDIAL MIDBACK
LOCATION DETAILED: RIGHT VENTRAL DISTAL FOREARM
LOCATION DETAILED: RIGHT RADIAL DORSAL HAND
LOCATION DETAILED: RIGHT ANTERIOR EARLOBE
LOCATION DETAILED: RIGHT SUPERIOR FOREHEAD
LOCATION DETAILED: RIGHT MEDIAL UPPER BACK
LOCATION DETAILED: LEFT RADIAL DORSAL HAND
LOCATION DETAILED: RIGHT INFERIOR MEDIAL MIDBACK
LOCATION DETAILED: RIGHT INFERIOR MEDIAL FOREHEAD
LOCATION DETAILED: RIGHT INFERIOR UPPER BACK
LOCATION DETAILED: INFERIOR THORACIC SPINE

## 2023-05-15 ASSESSMENT — LOCATION SIMPLE DESCRIPTION DERM
LOCATION SIMPLE: RIGHT UPPER BACK
LOCATION SIMPLE: RIGHT LOWER BACK
LOCATION SIMPLE: RIGHT FOREARM
LOCATION SIMPLE: UPPER BACK
LOCATION SIMPLE: RIGHT HAND
LOCATION SIMPLE: RIGHT EAR
LOCATION SIMPLE: LEFT HAND
LOCATION SIMPLE: RIGHT ZYGOMA
LOCATION SIMPLE: LEFT LOWER BACK
LOCATION SIMPLE: RIGHT FOREHEAD
LOCATION SIMPLE: LEFT FOREARM

## 2023-05-15 ASSESSMENT — LOCATION ZONE DERM
LOCATION ZONE: ARM
LOCATION ZONE: FACE
LOCATION ZONE: HAND
LOCATION ZONE: EAR
LOCATION ZONE: TRUNK

## 2024-01-08 ENCOUNTER — HOSPITAL ENCOUNTER (OUTPATIENT)
Facility: MEDICAL CENTER | Age: 84
End: 2024-01-08
Attending: PHYSICIAN ASSISTANT
Payer: MEDICARE

## 2024-01-08 LAB
FORWARD REASON: SPWHY: NORMAL
FORWARDED TO LAB: SPWHR: NORMAL
SPECIMEN SENT: SPWT1: NORMAL

## 2024-05-14 ENCOUNTER — APPOINTMENT (RX ONLY)
Dept: URBAN - METROPOLITAN AREA CLINIC 20 | Facility: CLINIC | Age: 84
Setting detail: DERMATOLOGY
End: 2024-05-14

## 2024-05-14 DIAGNOSIS — Z85.828 PERSONAL HISTORY OF OTHER MALIGNANT NEOPLASM OF SKIN: ICD-10-CM

## 2024-05-14 DIAGNOSIS — L82.1 OTHER SEBORRHEIC KERATOSIS: ICD-10-CM

## 2024-05-14 DIAGNOSIS — D22 MELANOCYTIC NEVI: ICD-10-CM

## 2024-05-14 DIAGNOSIS — Z71.89 OTHER SPECIFIED COUNSELING: ICD-10-CM

## 2024-05-14 DIAGNOSIS — L81.4 OTHER MELANIN HYPERPIGMENTATION: ICD-10-CM

## 2024-05-14 DIAGNOSIS — D18.0 HEMANGIOMA: ICD-10-CM

## 2024-05-14 DIAGNOSIS — L72.8 OTHER FOLLICULAR CYSTS OF THE SKIN AND SUBCUTANEOUS TISSUE: ICD-10-CM

## 2024-05-14 DIAGNOSIS — L57.0 ACTINIC KERATOSIS: ICD-10-CM

## 2024-05-14 PROBLEM — D22.61 MELANOCYTIC NEVI OF RIGHT UPPER LIMB, INCLUDING SHOULDER: Status: ACTIVE | Noted: 2024-05-14

## 2024-05-14 PROBLEM — D22.62 MELANOCYTIC NEVI OF LEFT UPPER LIMB, INCLUDING SHOULDER: Status: ACTIVE | Noted: 2024-05-14

## 2024-05-14 PROBLEM — D18.01 HEMANGIOMA OF SKIN AND SUBCUTANEOUS TISSUE: Status: ACTIVE | Noted: 2024-05-14

## 2024-05-14 PROBLEM — D22.5 MELANOCYTIC NEVI OF TRUNK: Status: ACTIVE | Noted: 2024-05-14

## 2024-05-14 PROCEDURE — 99213 OFFICE O/P EST LOW 20 MIN: CPT | Mod: 25

## 2024-05-14 PROCEDURE — ? SUNSCREEN RECOMMENDATIONS

## 2024-05-14 PROCEDURE — ? LIQUID NITROGEN

## 2024-05-14 PROCEDURE — ? COUNSELING

## 2024-05-14 PROCEDURE — 17003 DESTRUCT PREMALG LES 2-14: CPT

## 2024-05-14 PROCEDURE — 17000 DESTRUCT PREMALG LESION: CPT

## 2024-05-14 ASSESSMENT — LOCATION DETAILED DESCRIPTION DERM
LOCATION DETAILED: RIGHT MEDIAL UPPER BACK
LOCATION DETAILED: RIGHT DISTAL DORSAL FOREARM
LOCATION DETAILED: LEFT PROXIMAL DORSAL FOREARM
LOCATION DETAILED: RIGHT ANTERIOR EARLOBE
LOCATION DETAILED: LEFT SUPERIOR LATERAL MIDBACK
LOCATION DETAILED: RIGHT PROXIMAL DORSAL FOREARM
LOCATION DETAILED: RIGHT INFERIOR UPPER BACK
LOCATION DETAILED: RIGHT RADIAL DORSAL HAND
LOCATION DETAILED: RIGHT INFERIOR MEDIAL FOREHEAD
LOCATION DETAILED: INFERIOR THORACIC SPINE
LOCATION DETAILED: LEFT VENTRAL PROXIMAL FOREARM
LOCATION DETAILED: LEFT CLAVICULAR NECK
LOCATION DETAILED: RIGHT MEDIAL DORSAL WRIST
LOCATION DETAILED: SUPERIOR MID FOREHEAD
LOCATION DETAILED: LEFT RADIAL DORSAL HAND
LOCATION DETAILED: SUPERIOR THORACIC SPINE
LOCATION DETAILED: RIGHT VENTRAL DISTAL FOREARM

## 2024-05-14 ASSESSMENT — LOCATION SIMPLE DESCRIPTION DERM
LOCATION SIMPLE: RIGHT EAR
LOCATION SIMPLE: LEFT HAND
LOCATION SIMPLE: LEFT LOWER BACK
LOCATION SIMPLE: SUPERIOR FOREHEAD
LOCATION SIMPLE: RIGHT FOREARM
LOCATION SIMPLE: RIGHT FOREHEAD
LOCATION SIMPLE: RIGHT UPPER BACK
LOCATION SIMPLE: LEFT ANTERIOR NECK
LOCATION SIMPLE: RIGHT WRIST
LOCATION SIMPLE: LEFT FOREARM
LOCATION SIMPLE: RIGHT HAND
LOCATION SIMPLE: UPPER BACK

## 2024-05-14 ASSESSMENT — LOCATION ZONE DERM
LOCATION ZONE: EAR
LOCATION ZONE: HAND
LOCATION ZONE: TRUNK
LOCATION ZONE: NECK
LOCATION ZONE: FACE
LOCATION ZONE: ARM

## 2024-05-14 NOTE — PROCEDURE: LIQUID NITROGEN
Consent: The patient's consent was obtained including but not limited to risks of crusting, scabbing, blistering, scarring, darker or lighter pigmentary change, recurrence, incomplete removal and infection. RTC in 2 months if lesion(s) persistent.
Show Aperture Variable?: Yes
Post-Care Instructions: I reviewed with the patient in detail post-care instructions. Patient is to wear sunprotection, and avoid picking at any of the treated lesions. Pt may apply Vaseline to crusted or scabbing areas.
Render Note In Bullet Format When Appropriate: No
Detail Level: Detailed
Duration Of Freeze Thaw-Cycle (Seconds): 10
Number Of Freeze-Thaw Cycles: 2 freeze-thaw cycles

## 2025-03-28 ENCOUNTER — APPOINTMENT (OUTPATIENT)
Dept: URBAN - METROPOLITAN AREA CLINIC 20 | Facility: CLINIC | Age: 85
Setting detail: DERMATOLOGY
End: 2025-03-28

## 2025-03-28 DIAGNOSIS — L57.0 ACTINIC KERATOSIS: ICD-10-CM

## 2025-03-28 DIAGNOSIS — D22 MELANOCYTIC NEVI: ICD-10-CM

## 2025-03-28 DIAGNOSIS — Z71.89 OTHER SPECIFIED COUNSELING: ICD-10-CM

## 2025-03-28 DIAGNOSIS — L81.4 OTHER MELANIN HYPERPIGMENTATION: ICD-10-CM

## 2025-03-28 DIAGNOSIS — L82.1 OTHER SEBORRHEIC KERATOSIS: ICD-10-CM

## 2025-03-28 DIAGNOSIS — D18.0 HEMANGIOMA: ICD-10-CM

## 2025-03-28 PROBLEM — D18.01 HEMANGIOMA OF SKIN AND SUBCUTANEOUS TISSUE: Status: ACTIVE | Noted: 2025-03-28

## 2025-03-28 PROBLEM — D22.5 MELANOCYTIC NEVI OF TRUNK: Status: ACTIVE | Noted: 2025-03-28

## 2025-03-28 PROBLEM — D22.62 MELANOCYTIC NEVI OF LEFT UPPER LIMB, INCLUDING SHOULDER: Status: ACTIVE | Noted: 2025-03-28

## 2025-03-28 PROBLEM — D22.61 MELANOCYTIC NEVI OF RIGHT UPPER LIMB, INCLUDING SHOULDER: Status: ACTIVE | Noted: 2025-03-28

## 2025-03-28 PROCEDURE — 99213 OFFICE O/P EST LOW 20 MIN: CPT | Mod: 25

## 2025-03-28 PROCEDURE — 17003 DESTRUCT PREMALG LES 2-14: CPT

## 2025-03-28 PROCEDURE — ? LIQUID NITROGEN

## 2025-03-28 PROCEDURE — ? SUNSCREEN RECOMMENDATIONS

## 2025-03-28 PROCEDURE — ? COUNSELING

## 2025-03-28 PROCEDURE — 17000 DESTRUCT PREMALG LESION: CPT

## 2025-03-28 ASSESSMENT — LOCATION DETAILED DESCRIPTION DERM
LOCATION DETAILED: LEFT SUPERIOR PARIETAL SCALP
LOCATION DETAILED: SUPERIOR THORACIC SPINE
LOCATION DETAILED: LEFT RADIAL DORSAL HAND
LOCATION DETAILED: RIGHT CENTRAL FRONTAL SCALP
LOCATION DETAILED: RIGHT INFERIOR UPPER BACK
LOCATION DETAILED: RIGHT PROXIMAL DORSAL FOREARM
LOCATION DETAILED: LEFT CENTRAL FRONTAL SCALP
LOCATION DETAILED: RIGHT INFERIOR MEDIAL FOREHEAD
LOCATION DETAILED: RIGHT VENTRAL DISTAL FOREARM
LOCATION DETAILED: RIGHT SUPERIOR MEDIAL FOREHEAD
LOCATION DETAILED: INFERIOR THORACIC SPINE
LOCATION DETAILED: RIGHT RADIAL DORSAL HAND
LOCATION DETAILED: LEFT VENTRAL PROXIMAL FOREARM
LOCATION DETAILED: LEFT PROXIMAL DORSAL FOREARM

## 2025-03-28 ASSESSMENT — LOCATION SIMPLE DESCRIPTION DERM
LOCATION SIMPLE: LEFT FOREARM
LOCATION SIMPLE: RIGHT FOREHEAD
LOCATION SIMPLE: RIGHT HAND
LOCATION SIMPLE: UPPER BACK
LOCATION SIMPLE: RIGHT SCALP
LOCATION SIMPLE: RIGHT UPPER BACK
LOCATION SIMPLE: LEFT HAND
LOCATION SIMPLE: SCALP
LOCATION SIMPLE: LEFT SCALP
LOCATION SIMPLE: RIGHT FOREARM

## 2025-03-28 ASSESSMENT — LOCATION ZONE DERM
LOCATION ZONE: SCALP
LOCATION ZONE: FACE
LOCATION ZONE: ARM
LOCATION ZONE: TRUNK
LOCATION ZONE: HAND

## 2025-03-28 NOTE — PROCEDURE: LIQUID NITROGEN
Number Of Freeze-Thaw Cycles: 2 freeze-thaw cycles
Duration Of Freeze Thaw-Cycle (Seconds): 10
Show Aperture Variable?: Yes
Detail Level: Detailed
Post-Care Instructions: I reviewed with the patient in detail post-care instructions. Patient is to wear sunprotection, and avoid picking at any of the treated lesions. Pt may apply Vaseline to crusted or scabbing areas.
Render Note In Bullet Format When Appropriate: No
Consent: The patient's consent was obtained including but not limited to risks of crusting, scabbing, blistering, scarring, darker or lighter pigmentary change, recurrence, incomplete removal and infection. RTC in 2 months if lesion(s) persistent.

## 2025-08-11 ENCOUNTER — APPOINTMENT (OUTPATIENT)
Dept: URBAN - METROPOLITAN AREA CLINIC 4 | Facility: CLINIC | Age: 85
Setting detail: DERMATOLOGY
End: 2025-08-11

## 2025-08-11 DIAGNOSIS — Z71.89 OTHER SPECIFIED COUNSELING: ICD-10-CM

## 2025-08-11 DIAGNOSIS — L82.0 INFLAMED SEBORRHEIC KERATOSIS: ICD-10-CM

## 2025-08-11 PROCEDURE — ? LIQUID NITROGEN

## 2025-08-11 PROCEDURE — ? COUNSELING

## 2025-08-11 ASSESSMENT — LOCATION DETAILED DESCRIPTION DERM
LOCATION DETAILED: RIGHT INFERIOR HELIX
LOCATION DETAILED: LEFT SUPERIOR LATERAL UPPER BACK
LOCATION DETAILED: RIGHT INFERIOR MEDIAL UPPER BACK
LOCATION DETAILED: RIGHT MEDIAL FOREHEAD
LOCATION DETAILED: RIGHT CLAVICULAR NECK
LOCATION DETAILED: RIGHT INFERIOR UPPER BACK
LOCATION DETAILED: LEFT INFERIOR MEDIAL UPPER BACK
LOCATION DETAILED: RIGHT ANTIHELIX
LOCATION DETAILED: STERNUM
LOCATION DETAILED: RIGHT LATERAL TRAPEZIAL NECK
LOCATION DETAILED: RIGHT SUPERIOR UPPER BACK
LOCATION DETAILED: LEFT CLAVICULAR SKIN
LOCATION DETAILED: RIGHT MEDIAL UPPER BACK
LOCATION DETAILED: RIGHT CLAVICULAR SKIN
LOCATION DETAILED: LEFT CLAVICULAR NECK
LOCATION DETAILED: LEFT MEDIAL INFERIOR CHEST
LOCATION DETAILED: LEFT SUPERIOR LATERAL MIDBACK

## 2025-08-11 ASSESSMENT — LOCATION SIMPLE DESCRIPTION DERM
LOCATION SIMPLE: POSTERIOR NECK
LOCATION SIMPLE: LEFT CLAVICULAR SKIN
LOCATION SIMPLE: LEFT LOWER BACK
LOCATION SIMPLE: RIGHT EAR
LOCATION SIMPLE: LEFT UPPER BACK
LOCATION SIMPLE: RIGHT ANTERIOR NECK
LOCATION SIMPLE: RIGHT FOREHEAD
LOCATION SIMPLE: RIGHT CLAVICULAR SKIN
LOCATION SIMPLE: LEFT ANTERIOR NECK
LOCATION SIMPLE: CHEST
LOCATION SIMPLE: RIGHT UPPER BACK

## 2025-08-11 ASSESSMENT — LOCATION ZONE DERM
LOCATION ZONE: FACE
LOCATION ZONE: NECK
LOCATION ZONE: EAR
LOCATION ZONE: TRUNK